# Patient Record
Sex: FEMALE | Race: WHITE | NOT HISPANIC OR LATINO | Employment: OTHER | URBAN - METROPOLITAN AREA
[De-identification: names, ages, dates, MRNs, and addresses within clinical notes are randomized per-mention and may not be internally consistent; named-entity substitution may affect disease eponyms.]

---

## 2019-08-01 ENCOUNTER — HOSPITAL ENCOUNTER (OUTPATIENT)
Dept: MRI IMAGING | Facility: HOSPITAL | Age: 49
Discharge: HOME | End: 2019-08-01

## 2021-08-27 ENCOUNTER — APPOINTMENT (OUTPATIENT)
Dept: GENERAL RADIOLOGY | Facility: HOSPITAL | Age: 51
End: 2021-08-27

## 2021-08-27 ENCOUNTER — HOSPITAL ENCOUNTER (EMERGENCY)
Facility: HOSPITAL | Age: 51
Discharge: HOME | End: 2021-08-27
Attending: EMERGENCY MEDICINE | Admitting: EMERGENCY MEDICINE

## 2021-08-27 VITALS
RESPIRATION RATE: 19 BRPM | SYSTOLIC BLOOD PRESSURE: 171 MMHG | DIASTOLIC BLOOD PRESSURE: 109 MMHG | BODY MASS INDEX: 28.12 KG/M2 | OXYGEN SATURATION: 98 % | WEIGHT: 175 LBS | TEMPERATURE: 99.3 F | HEART RATE: 102 BPM | HEIGHT: 66 IN

## 2021-08-27 DIAGNOSIS — I10 UNCONTROLLED HYPERTENSION: ICD-10-CM

## 2021-08-27 DIAGNOSIS — T40.601A OPIATE OVERDOSE, ACCIDENTAL OR UNINTENTIONAL, INITIAL ENCOUNTER (HCC): Primary | ICD-10-CM

## 2021-08-27 LAB
ALBUMIN SERPL-MCNC: 4.8 G/DL (ref 3.5–5.2)
ALBUMIN/GLOB SERPL: 1.4 G/DL
ALP SERPL-CCNC: 116 U/L (ref 39–117)
ALT SERPL W P-5'-P-CCNC: 29 U/L (ref 1–33)
ANION GAP SERPL CALCULATED.3IONS-SCNC: 13.5 MMOL/L (ref 5–15)
AST SERPL-CCNC: 31 U/L (ref 1–32)
BASOPHILS # BLD AUTO: 0.04 10*3/MM3 (ref 0–0.2)
BASOPHILS NFR BLD AUTO: 0.5 % (ref 0–1.5)
BILIRUB SERPL-MCNC: 0.4 MG/DL (ref 0–1.2)
BUN SERPL-MCNC: 11 MG/DL (ref 6–20)
BUN/CREAT SERPL: 8.8 (ref 7–25)
CALCIUM SPEC-SCNC: 9.7 MG/DL (ref 8.6–10.5)
CHLORIDE SERPL-SCNC: 101 MMOL/L (ref 98–107)
CO2 SERPL-SCNC: 25.5 MMOL/L (ref 22–29)
CREAT SERPL-MCNC: 1.25 MG/DL (ref 0.57–1)
DEPRECATED RDW RBC AUTO: 49.3 FL (ref 37–54)
EOSINOPHIL # BLD AUTO: 0.13 10*3/MM3 (ref 0–0.4)
EOSINOPHIL NFR BLD AUTO: 1.6 % (ref 0.3–6.2)
ERYTHROCYTE [DISTWIDTH] IN BLOOD BY AUTOMATED COUNT: 14.6 % (ref 12.3–15.4)
GFR SERPL CREATININE-BSD FRML MDRD: 45 ML/MIN/1.73
GLOBULIN UR ELPH-MCNC: 3.5 GM/DL
GLUCOSE SERPL-MCNC: 107 MG/DL (ref 65–99)
HCT VFR BLD AUTO: 46.6 % (ref 34–46.6)
HGB BLD-MCNC: 15.8 G/DL (ref 12–15.9)
HOLD SPECIMEN: NORMAL
HOLD SPECIMEN: NORMAL
IMM GRANULOCYTES # BLD AUTO: 0.02 10*3/MM3 (ref 0–0.05)
IMM GRANULOCYTES NFR BLD AUTO: 0.2 % (ref 0–0.5)
LYMPHOCYTES # BLD AUTO: 2.41 10*3/MM3 (ref 0.7–3.1)
LYMPHOCYTES NFR BLD AUTO: 28.8 % (ref 19.6–45.3)
MCH RBC QN AUTO: 31 PG (ref 26.6–33)
MCHC RBC AUTO-ENTMCNC: 33.9 G/DL (ref 31.5–35.7)
MCV RBC AUTO: 91.4 FL (ref 79–97)
MONOCYTES # BLD AUTO: 0.48 10*3/MM3 (ref 0.1–0.9)
MONOCYTES NFR BLD AUTO: 5.7 % (ref 5–12)
NEUTROPHILS NFR BLD AUTO: 5.28 10*3/MM3 (ref 1.7–7)
NEUTROPHILS NFR BLD AUTO: 63.2 % (ref 42.7–76)
NRBC BLD AUTO-RTO: 0 /100 WBC (ref 0–0.2)
PLATELET # BLD AUTO: 221 10*3/MM3 (ref 140–450)
PMV BLD AUTO: 10.4 FL (ref 6–12)
POTASSIUM SERPL-SCNC: 3.7 MMOL/L (ref 3.5–5.2)
PROT SERPL-MCNC: 8.3 G/DL (ref 6–8.5)
RBC # BLD AUTO: 5.1 10*6/MM3 (ref 3.77–5.28)
SODIUM SERPL-SCNC: 140 MMOL/L (ref 136–145)
WBC # BLD AUTO: 8.36 10*3/MM3 (ref 3.4–10.8)
WHOLE BLOOD HOLD SPECIMEN: NORMAL

## 2021-08-27 PROCEDURE — 71045 X-RAY EXAM CHEST 1 VIEW: CPT

## 2021-08-27 PROCEDURE — 80053 COMPREHEN METABOLIC PANEL: CPT | Performed by: EMERGENCY MEDICINE

## 2021-08-27 PROCEDURE — 71045 X-RAY EXAM CHEST 1 VIEW: CPT | Performed by: RADIOLOGY

## 2021-08-27 PROCEDURE — 85025 COMPLETE CBC W/AUTO DIFF WBC: CPT | Performed by: EMERGENCY MEDICINE

## 2021-08-27 PROCEDURE — 99284 EMERGENCY DEPT VISIT MOD MDM: CPT

## 2021-08-27 RX ORDER — LISINOPRIL 10 MG/1
10 TABLET ORAL DAILY
Qty: 30 TABLET | Refills: 0 | Status: SHIPPED | OUTPATIENT
Start: 2021-08-27 | End: 2023-01-29

## 2021-08-27 RX ADMIN — SODIUM CHLORIDE 1000 ML: 9 INJECTION, SOLUTION INTRAVENOUS at 16:46

## 2022-02-08 ENCOUNTER — TRANSCRIBE ORDERS (OUTPATIENT)
Dept: PHYSICAL THERAPY | Facility: CLINIC | Age: 52
End: 2022-02-08

## 2022-02-08 DIAGNOSIS — M54.2 CERVICALGIA: ICD-10-CM

## 2022-02-08 DIAGNOSIS — M54.6 PAIN IN THORACIC SPINE: ICD-10-CM

## 2022-02-08 DIAGNOSIS — M54.50 LOW BACK PAIN, UNSPECIFIED BACK PAIN LATERALITY, UNSPECIFIED CHRONICITY, UNSPECIFIED WHETHER SCIATICA PRESENT: Primary | ICD-10-CM

## 2022-03-02 ENCOUNTER — TREATMENT (OUTPATIENT)
Dept: PHYSICAL THERAPY | Facility: CLINIC | Age: 52
End: 2022-03-02

## 2022-03-02 DIAGNOSIS — M25.60 STIFFNESS IN JOINT: ICD-10-CM

## 2022-03-02 DIAGNOSIS — M54.50 LOW BACK PAIN, UNSPECIFIED BACK PAIN LATERALITY, UNSPECIFIED CHRONICITY, UNSPECIFIED WHETHER SCIATICA PRESENT: Primary | ICD-10-CM

## 2022-03-02 PROCEDURE — 97161 PT EVAL LOW COMPLEX 20 MIN: CPT | Performed by: PHYSICAL THERAPIST

## 2022-03-02 PROCEDURE — 97110 THERAPEUTIC EXERCISES: CPT | Performed by: PHYSICAL THERAPIST

## 2022-03-02 NOTE — PROGRESS NOTES
Physical Therapy Initial Evaluation and Plan of Care / Discharge    Patient: Davidson Lyle   : 1970  Diagnosis/ICD-10 Code:  Low back pain, unspecified back pain laterality, unspecified chronicity, unspecified whether sciatica present [M54.50]  Referring practitioner: Karly Mane,*  Date of Initial Visit: 3/2/2022  Today's Date: 3/2/2022  Patient seen for 1 sessions           Subjective Questionnaire: Oswestry: 3145 = 68.9% Disability      Subjective Evaluation    History of Present Illness  Mechanism of injury: The patient presents to physical therapy with complaints of low back pain which has been present since . The pain radiates down the left left to her toes. Her left toes are numb most of the day. Her back pain is worse with prolonged standing, sitting, or walking. She is currently taking hydrocodone 3x/day for pain management. Her pain is improved with laying down flat. She has tried injections in her back, but they did not give her any relief. She has been referred to PT to learn Carmen exercises that she can perform at home. She has had a cervical fusion from C4-5, but no low back surgeries.    Pain  Current pain ratin  At best pain ratin  At worst pain ratin    Patient Goals  Patient goal: The patient would like to learn exercises that she can perform at home for management.           Objective          Tenderness     Additional Tenderness Details  Tenderness bilaterally in lumbar paraspinals, piriformis muscle belly, and with PA glides from L2-L5    Neurological Testing     Additional Neurological Details  Hyposensation on left in L2-3 and numbness over first toe of left foot. Otherwise, sensation to light touch intact and equal bilaterally from L2-S1    Seated slump: (+) on left for increased sciatic neural tension    Supine passive SLR: (+) on left at 45 degrees for increased sciatic neural tension    Active Range of Motion     Lumbar   Flexion: 45 degrees  with pain  Extension: 5 degrees with pain  Left lateral flexion: Active left lumbar lateral flexion: mid thigh. with pain  Right lateral flexion: Active right lumbar lateral flexion: knee joint line. with pain  Left rotation: Active left lumbar rotation: 50% with pain  Right rotation: Active right lumbar rotation: 50% with pain    Strength/Myotome Testing     Left Hip   Planes of Motion   Flexion: 3+  Extension: 3+    Right Hip   Planes of Motion   Flexion: 4-  Extension: 3+      See Exercise, Manual, and Modality Logs for complete treatment.     Assessment & Plan     Assessment  Impairments: abnormal gait, abnormal muscle firing, abnormal muscle tone, abnormal or restricted ROM, activity intolerance, impaired physical strength, lacks appropriate home exercise program and pain with function  Functional Limitations: carrying objects, lifting, walking, uncomfortable because of pain, sitting, standing, stooping and unable to perform repetitive tasks  Assessment details: The patient presents to physical therapy with complaints of chronic low back pain with radicular pain into her left lower extremity. She presents with associated lumbar stiffness, hip weakness, tenderness to palpation, and functional mobility deficits (KITTY). She did not want to continue with traditional PT, but rather she wanted to continue with a home exercise program. She was provided with a detailed HEP and practiced each exercise before leaving the clinic today. She was educated on exercise modification if symptoms worsen. She is appropriate for discharge from PT at this time per patient request. She may benefit from additional PT for this condition in the future.    Goals  Plan Goals: N/a - Evaluation and discharge    Plan  Therapy options: will not be seen for skilled therapy services  Treatment plan discussed with: patient  Plan details: The patient wanted to be evaluated today and provided with a HEP instead of participating in traditional PT in  the clinic. She was provided with a detailed HEP and is appropriate for discharge at this time.        Visit Diagnoses:    ICD-10-CM ICD-9-CM   1. Low back pain, unspecified back pain laterality, unspecified chronicity, unspecified whether sciatica present  M54.50 724.2   2. Pain in thoracic spine  M54.6 724.1   3. Cervicalgia  M54.2 723.1       History # of Personal Factors and/or Comorbidities: LOW (0)  Examination of Body System(s): # of elements: LOW (1-2)  Clinical Presentation: STABLE   Clinical Decision Making: LOW       Timed:         Manual Therapy:    0     mins  06868;     Therapeutic Exercise:    10     mins  15605;     Neuromuscular Erinn:    0    mins  16123;    Therapeutic Activity:     0     mins  84104;     Gait Trainin     mins  09980;     Ultrasound:     0     mins  34753;    Ionto                               0    mins   61318  Self Care                       0     mins   21341  Canalith Repos    0     mins 18107      Un-Timed:  Electrical Stimulation:    0     mins  84425 (MC );  Dry Needling     0     mins self-pay  Traction     0     mins 84250  Low Eval     25     Mins  45483  Mod Eval     0     Mins  58452  High Eval                       0     Mins  23211  Re-Eval                           0    mins  90660    Timed Treatment:   10   mins   Total Treatment:     35   mins    PT SIGNATURE: Alex Powell PT    Electronically signed 3/2/2022    [place] License: PT - [id number]      Initial Certification  Certification Period: 3/2/2022 thru 2022  I certify that the therapy services are furnished while this patient is under my care.  The services outlined above are required by this patient, and will be reviewed every 90 days.     PHYSICIAN: Karly Mane APRN  NPI: [id number]      DATE:     Please sign and return via fax to [phone]. Thank you, [facility].

## 2022-10-25 ENCOUNTER — TRANSCRIBE ORDERS (OUTPATIENT)
Dept: LAB | Facility: HOSPITAL | Age: 52
End: 2022-10-25

## 2022-10-25 ENCOUNTER — LAB (OUTPATIENT)
Dept: LAB | Facility: HOSPITAL | Age: 52
End: 2022-10-25

## 2022-10-25 DIAGNOSIS — R73.9 HYPERGLYCEMIA: ICD-10-CM

## 2022-10-25 DIAGNOSIS — N95.1 MENOPAUSAL AND FEMALE CLIMACTERIC STATES: Primary | ICD-10-CM

## 2022-10-25 DIAGNOSIS — R53.83 OTHER FATIGUE: ICD-10-CM

## 2022-10-25 DIAGNOSIS — E55.9 VITAMIN D DEFICIENCY: ICD-10-CM

## 2022-10-25 DIAGNOSIS — N95.1 MENOPAUSAL AND FEMALE CLIMACTERIC STATES: ICD-10-CM

## 2022-10-25 DIAGNOSIS — I10 ESSENTIAL (PRIMARY) HYPERTENSION: ICD-10-CM

## 2022-10-25 LAB
25(OH)D3 SERPL-MCNC: 10.8 NG/ML (ref 30–100)
ALBUMIN SERPL-MCNC: 4.5 G/DL (ref 3.5–5.2)
ALBUMIN UR-MCNC: <1.2 MG/DL
ALBUMIN/GLOB SERPL: 1.5 G/DL
ALP SERPL-CCNC: 90 U/L (ref 39–117)
ALT SERPL W P-5'-P-CCNC: 50 U/L (ref 1–33)
ANION GAP SERPL CALCULATED.3IONS-SCNC: 11.5 MMOL/L (ref 5–15)
AST SERPL-CCNC: 43 U/L (ref 1–32)
BACTERIA UR QL AUTO: ABNORMAL /HPF
BASOPHILS # BLD AUTO: 0.05 10*3/MM3 (ref 0–0.2)
BASOPHILS NFR BLD AUTO: 0.7 % (ref 0–1.5)
BILIRUB SERPL-MCNC: 0.2 MG/DL (ref 0–1.2)
BILIRUB UR QL STRIP: NEGATIVE
BUN SERPL-MCNC: 20 MG/DL (ref 6–20)
BUN/CREAT SERPL: 15.5 (ref 7–25)
CALCIUM SPEC-SCNC: 9.2 MG/DL (ref 8.6–10.5)
CHLORIDE SERPL-SCNC: 101 MMOL/L (ref 98–107)
CHOLEST SERPL-MCNC: 217 MG/DL (ref 0–200)
CLARITY UR: ABNORMAL
CO2 SERPL-SCNC: 26.5 MMOL/L (ref 22–29)
COLOR UR: YELLOW
CREAT SERPL-MCNC: 1.29 MG/DL (ref 0.57–1)
DEPRECATED RDW RBC AUTO: 45.3 FL (ref 37–54)
EGFRCR SERPLBLD CKD-EPI 2021: 49.4 ML/MIN/1.73
EOSINOPHIL # BLD AUTO: 0.11 10*3/MM3 (ref 0–0.4)
EOSINOPHIL NFR BLD AUTO: 1.5 % (ref 0.3–6.2)
ERYTHROCYTE [DISTWIDTH] IN BLOOD BY AUTOMATED COUNT: 12.8 % (ref 12.3–15.4)
ESTRADIOL SERPL HS-MCNC: 18.5 PG/ML
FOLATE SERPL-MCNC: 9.55 NG/ML (ref 4.78–24.2)
FSH SERPL-ACNC: 69.8 MIU/ML
GLOBULIN UR ELPH-MCNC: 3 GM/DL
GLUCOSE SERPL-MCNC: 120 MG/DL (ref 65–99)
GLUCOSE UR STRIP-MCNC: NEGATIVE MG/DL
HBA1C MFR BLD: 5.9 % (ref 4.8–5.6)
HCT VFR BLD AUTO: 43.6 % (ref 34–46.6)
HDLC SERPL-MCNC: 55 MG/DL (ref 40–60)
HGB BLD-MCNC: 14.6 G/DL (ref 12–15.9)
HGB UR QL STRIP.AUTO: NEGATIVE
HYALINE CASTS UR QL AUTO: ABNORMAL /LPF
IMM GRANULOCYTES # BLD AUTO: 0.02 10*3/MM3 (ref 0–0.05)
IMM GRANULOCYTES NFR BLD AUTO: 0.3 % (ref 0–0.5)
KETONES UR QL STRIP: NEGATIVE
LDLC SERPL CALC-MCNC: 134 MG/DL (ref 0–100)
LDLC/HDLC SERPL: 2.36 {RATIO}
LEUKOCYTE ESTERASE UR QL STRIP.AUTO: ABNORMAL
LYMPHOCYTES # BLD AUTO: 2.4 10*3/MM3 (ref 0.7–3.1)
LYMPHOCYTES NFR BLD AUTO: 33.7 % (ref 19.6–45.3)
MCH RBC QN AUTO: 32.5 PG (ref 26.6–33)
MCHC RBC AUTO-ENTMCNC: 33.5 G/DL (ref 31.5–35.7)
MCV RBC AUTO: 97.1 FL (ref 79–97)
MONOCYTES # BLD AUTO: 0.4 10*3/MM3 (ref 0.1–0.9)
MONOCYTES NFR BLD AUTO: 5.6 % (ref 5–12)
NEUTROPHILS NFR BLD AUTO: 4.14 10*3/MM3 (ref 1.7–7)
NEUTROPHILS NFR BLD AUTO: 58.2 % (ref 42.7–76)
NITRITE UR QL STRIP: NEGATIVE
NRBC BLD AUTO-RTO: 0 /100 WBC (ref 0–0.2)
PH UR STRIP.AUTO: 6 [PH] (ref 5–8)
PLATELET # BLD AUTO: 235 10*3/MM3 (ref 140–450)
PMV BLD AUTO: 11.5 FL (ref 6–12)
POTASSIUM SERPL-SCNC: 4.5 MMOL/L (ref 3.5–5.2)
PROGEST SERPL-MCNC: 0.17 NG/ML
PROT SERPL-MCNC: 7.5 G/DL (ref 6–8.5)
PROT UR QL STRIP: NEGATIVE
RBC # BLD AUTO: 4.49 10*6/MM3 (ref 3.77–5.28)
RBC # UR STRIP: ABNORMAL /HPF
REF LAB TEST METHOD: ABNORMAL
SODIUM SERPL-SCNC: 139 MMOL/L (ref 136–145)
SP GR UR STRIP: 1.01 (ref 1–1.03)
SQUAMOUS #/AREA URNS HPF: ABNORMAL /HPF
TRIGL SERPL-MCNC: 160 MG/DL (ref 0–150)
TSH SERPL DL<=0.05 MIU/L-ACNC: 5.5 UIU/ML (ref 0.27–4.2)
UROBILINOGEN UR QL STRIP: ABNORMAL
VIT B12 BLD-MCNC: 375 PG/ML (ref 211–946)
VLDLC SERPL-MCNC: 28 MG/DL (ref 5–40)
WBC # UR STRIP: ABNORMAL /HPF
WBC NRBC COR # BLD: 7.12 10*3/MM3 (ref 3.4–10.8)

## 2022-10-25 PROCEDURE — 82670 ASSAY OF TOTAL ESTRADIOL: CPT

## 2022-10-25 PROCEDURE — 82306 VITAMIN D 25 HYDROXY: CPT

## 2022-10-25 PROCEDURE — 83036 HEMOGLOBIN GLYCOSYLATED A1C: CPT

## 2022-10-25 PROCEDURE — 36415 COLL VENOUS BLD VENIPUNCTURE: CPT

## 2022-10-25 PROCEDURE — 80053 COMPREHEN METABOLIC PANEL: CPT

## 2022-10-25 PROCEDURE — 84144 ASSAY OF PROGESTERONE: CPT

## 2022-10-25 PROCEDURE — 80061 LIPID PANEL: CPT

## 2022-10-25 PROCEDURE — 82043 UR ALBUMIN QUANTITATIVE: CPT

## 2022-10-25 PROCEDURE — 84443 ASSAY THYROID STIM HORMONE: CPT

## 2022-10-25 PROCEDURE — 81001 URINALYSIS AUTO W/SCOPE: CPT

## 2022-10-25 PROCEDURE — 83001 ASSAY OF GONADOTROPIN (FSH): CPT

## 2022-10-25 PROCEDURE — 82607 VITAMIN B-12: CPT

## 2022-10-25 PROCEDURE — 82746 ASSAY OF FOLIC ACID SERUM: CPT

## 2022-10-25 PROCEDURE — 85025 COMPLETE CBC W/AUTO DIFF WBC: CPT

## 2022-11-02 ENCOUNTER — TRANSCRIBE ORDERS (OUTPATIENT)
Dept: ADMINISTRATIVE | Facility: HOSPITAL | Age: 52
End: 2022-11-02

## 2022-11-02 DIAGNOSIS — R74.01 ELEVATED TRANSAMINASE LEVEL: Primary | ICD-10-CM

## 2022-11-02 DIAGNOSIS — Z12.31 SCREENING MAMMOGRAM, ENCOUNTER FOR: Primary | ICD-10-CM

## 2022-12-01 ENCOUNTER — TRANSCRIBE ORDERS (OUTPATIENT)
Dept: LAB | Facility: HOSPITAL | Age: 52
End: 2022-12-01

## 2022-12-01 DIAGNOSIS — R53.83 OTHER FATIGUE: ICD-10-CM

## 2022-12-01 DIAGNOSIS — E55.9 VITAMIN D DEFICIENCY: ICD-10-CM

## 2022-12-01 DIAGNOSIS — E03.9 HYPOTHYROIDISM, UNSPECIFIED TYPE: Primary | ICD-10-CM

## 2022-12-08 ENCOUNTER — APPOINTMENT (OUTPATIENT)
Dept: ULTRASOUND IMAGING | Facility: HOSPITAL | Age: 52
End: 2022-12-08

## 2023-01-29 ENCOUNTER — APPOINTMENT (OUTPATIENT)
Dept: GENERAL RADIOLOGY | Facility: HOSPITAL | Age: 53
DRG: 682 | End: 2023-01-29
Payer: MEDICARE

## 2023-01-29 ENCOUNTER — APPOINTMENT (OUTPATIENT)
Dept: CT IMAGING | Facility: HOSPITAL | Age: 53
DRG: 682 | End: 2023-01-29
Payer: MEDICARE

## 2023-01-29 ENCOUNTER — HOSPITAL ENCOUNTER (INPATIENT)
Facility: HOSPITAL | Age: 53
LOS: 1 days | Discharge: HOME | DRG: 682 | End: 2023-02-01
Attending: EMERGENCY MEDICINE | Admitting: INTERNAL MEDICINE
Payer: MEDICARE

## 2023-01-29 DIAGNOSIS — N17.0 ACUTE KIDNEY INJURY (AKI) WITH ACUTE TUBULAR NECROSIS (ATN): Primary | ICD-10-CM

## 2023-01-29 DIAGNOSIS — R26.2 DIFFICULTY IN WALKING: ICD-10-CM

## 2023-01-29 DIAGNOSIS — Z78.9 DECREASED ACTIVITIES OF DAILY LIVING (ADL): ICD-10-CM

## 2023-01-29 DIAGNOSIS — R13.12 DYSPHAGIA, OROPHARYNGEAL: ICD-10-CM

## 2023-01-29 DIAGNOSIS — D68.00 VON WILLEBRAND'S DISEASE: ICD-10-CM

## 2023-01-29 PROBLEM — N17.9 AKI (ACUTE KIDNEY INJURY): Status: ACTIVE | Noted: 2023-01-29

## 2023-01-29 LAB
ALBUMIN SERPL-MCNC: 4 G/DL (ref 3.5–5.2)
ALBUMIN/GLOB SERPL: 1.3 G/DL
ALP SERPL-CCNC: 100 U/L (ref 39–117)
ALT SERPL W P-5'-P-CCNC: 69 U/L (ref 1–33)
AMPHET+METHAMPHET UR QL: NEGATIVE
AMPHET+METHAMPHET UR QL: NEGATIVE
ANION GAP SERPL CALCULATED.3IONS-SCNC: 12.5 MMOL/L (ref 5–15)
AST SERPL-CCNC: 172 U/L (ref 1–32)
BACTERIA UR QL AUTO: ABNORMAL /HPF
BARBITURATES UR QL SCN: NEGATIVE
BARBITURATES UR QL SCN: NEGATIVE
BASOPHILS # BLD AUTO: 0.04 10*3/MM3 (ref 0–0.2)
BASOPHILS NFR BLD AUTO: 0.4 % (ref 0–1.5)
BENZODIAZ UR QL SCN: NEGATIVE
BENZODIAZ UR QL SCN: NEGATIVE
BILIRUB SERPL-MCNC: 0.3 MG/DL (ref 0–1.2)
BILIRUB UR QL STRIP: NEGATIVE
BUN SERPL-MCNC: 33 MG/DL (ref 6–20)
BUN/CREAT SERPL: 9.6 (ref 7–25)
CALCIUM SPEC-SCNC: 8.5 MG/DL (ref 8.6–10.5)
CANNABINOIDS SERPL QL: NEGATIVE
CANNABINOIDS SERPL QL: NEGATIVE
CHLORIDE SERPL-SCNC: 93 MMOL/L (ref 98–107)
CK SERPL-CCNC: ABNORMAL U/L (ref 20–180)
CLARITY UR: ABNORMAL
CO2 SERPL-SCNC: 24.5 MMOL/L (ref 22–29)
COCAINE UR QL: NEGATIVE
COCAINE UR QL: NEGATIVE
COLOR UR: YELLOW
CREAT SERPL-MCNC: 3.43 MG/DL (ref 0.57–1)
D-LACTATE SERPL-SCNC: 1.2 MMOL/L (ref 0.5–2)
DEPRECATED RDW RBC AUTO: 45.5 FL (ref 37–54)
EGFRCR SERPLBLD CKD-EPI 2021: 15.3 ML/MIN/1.73
EOSINOPHIL # BLD AUTO: 0.15 10*3/MM3 (ref 0–0.4)
EOSINOPHIL NFR BLD AUTO: 1.5 % (ref 0.3–6.2)
ERYTHROCYTE [DISTWIDTH] IN BLOOD BY AUTOMATED COUNT: 13.4 % (ref 12.3–15.4)
GLOBULIN UR ELPH-MCNC: 3.1 GM/DL
GLUCOSE BLDC GLUCOMTR-MCNC: 98 MG/DL (ref 70–99)
GLUCOSE SERPL-MCNC: 113 MG/DL (ref 65–99)
GLUCOSE UR STRIP-MCNC: NEGATIVE MG/DL
HCT VFR BLD AUTO: 38.7 % (ref 34–46.6)
HGB BLD-MCNC: 12.9 G/DL (ref 12–15.9)
HGB UR QL STRIP.AUTO: ABNORMAL
HOLD SPECIMEN: NORMAL
HOLD SPECIMEN: NORMAL
HYALINE CASTS UR QL AUTO: ABNORMAL /LPF
IMM GRANULOCYTES # BLD AUTO: 0.02 10*3/MM3 (ref 0–0.05)
IMM GRANULOCYTES NFR BLD AUTO: 0.2 % (ref 0–0.5)
KETONES UR QL STRIP: NEGATIVE
LEUKOCYTE ESTERASE UR QL STRIP.AUTO: NEGATIVE
LYMPHOCYTES # BLD AUTO: 3.1 10*3/MM3 (ref 0.7–3.1)
LYMPHOCYTES NFR BLD AUTO: 31.6 % (ref 19.6–45.3)
MAGNESIUM SERPL-MCNC: 2.5 MG/DL (ref 1.6–2.6)
MCH RBC QN AUTO: 31.2 PG (ref 26.6–33)
MCHC RBC AUTO-ENTMCNC: 33.3 G/DL (ref 31.5–35.7)
MCV RBC AUTO: 93.7 FL (ref 79–97)
METHADONE UR QL SCN: POSITIVE
METHADONE UR QL SCN: POSITIVE
MONOCYTES # BLD AUTO: 0.81 10*3/MM3 (ref 0.1–0.9)
MONOCYTES NFR BLD AUTO: 8.2 % (ref 5–12)
NEUTROPHILS NFR BLD AUTO: 5.7 10*3/MM3 (ref 1.7–7)
NEUTROPHILS NFR BLD AUTO: 58.1 % (ref 42.7–76)
NITRITE UR QL STRIP: NEGATIVE
NRBC BLD AUTO-RTO: 0 /100 WBC (ref 0–0.2)
OPIATES UR QL: POSITIVE
OPIATES UR QL: POSITIVE
OXYCODONE UR QL SCN: NEGATIVE
OXYCODONE UR QL SCN: NEGATIVE
PH UR STRIP.AUTO: <=5 [PH] (ref 5–8)
PHOSPHATE SERPL-MCNC: 5.9 MG/DL (ref 2.5–4.5)
PLATELET # BLD AUTO: 187 10*3/MM3 (ref 140–450)
PMV BLD AUTO: 11.9 FL (ref 6–12)
POTASSIUM SERPL-SCNC: 4.2 MMOL/L (ref 3.5–5.2)
PROT SERPL-MCNC: 7.1 G/DL (ref 6–8.5)
PROT UR QL STRIP: ABNORMAL
QT INTERVAL: 498 MS
RBC # BLD AUTO: 4.13 10*6/MM3 (ref 3.77–5.28)
RBC # UR STRIP: ABNORMAL /HPF
REF LAB TEST METHOD: ABNORMAL
SODIUM SERPL-SCNC: 130 MMOL/L (ref 136–145)
SP GR UR STRIP: 1.01 (ref 1–1.03)
SQUAMOUS #/AREA URNS HPF: ABNORMAL /HPF
TROPONIN T SERPL-MCNC: <0.01 NG/ML (ref 0–0.03)
UROBILINOGEN UR QL STRIP: ABNORMAL
WBC # UR STRIP: ABNORMAL /HPF
WBC NRBC COR # BLD: 9.82 10*3/MM3 (ref 3.4–10.8)
WHOLE BLOOD HOLD COAG: NORMAL
WHOLE BLOOD HOLD SPECIMEN: NORMAL

## 2023-01-29 PROCEDURE — 80307 DRUG TEST PRSMV CHEM ANLYZR: CPT | Performed by: INTERNAL MEDICINE

## 2023-01-29 PROCEDURE — 82550 ASSAY OF CK (CPK): CPT | Performed by: EMERGENCY MEDICINE

## 2023-01-29 PROCEDURE — 80307 DRUG TEST PRSMV CHEM ANLYZR: CPT | Performed by: EMERGENCY MEDICINE

## 2023-01-29 PROCEDURE — 82077 ASSAY SPEC XCP UR&BREATH IA: CPT | Performed by: EMERGENCY MEDICINE

## 2023-01-29 PROCEDURE — 80179 DRUG ASSAY SALICYLATE: CPT | Performed by: EMERGENCY MEDICINE

## 2023-01-29 PROCEDURE — 71045 X-RAY EXAM CHEST 1 VIEW: CPT

## 2023-01-29 PROCEDURE — 36415 COLL VENOUS BLD VENIPUNCTURE: CPT | Performed by: EMERGENCY MEDICINE

## 2023-01-29 PROCEDURE — G0378 HOSPITAL OBSERVATION PER HR: HCPCS

## 2023-01-29 PROCEDURE — 84443 ASSAY THYROID STIM HORMONE: CPT | Performed by: EMERGENCY MEDICINE

## 2023-01-29 PROCEDURE — 25010000002 CEFTRIAXONE PER 250 MG: Performed by: EMERGENCY MEDICINE

## 2023-01-29 PROCEDURE — 80143 DRUG ASSAY ACETAMINOPHEN: CPT | Performed by: EMERGENCY MEDICINE

## 2023-01-29 PROCEDURE — 87040 BLOOD CULTURE FOR BACTERIA: CPT | Performed by: EMERGENCY MEDICINE

## 2023-01-29 PROCEDURE — 84100 ASSAY OF PHOSPHORUS: CPT | Performed by: INTERNAL MEDICINE

## 2023-01-29 PROCEDURE — 70450 CT HEAD/BRAIN W/O DYE: CPT

## 2023-01-29 PROCEDURE — 84484 ASSAY OF TROPONIN QUANT: CPT | Performed by: EMERGENCY MEDICINE

## 2023-01-29 PROCEDURE — 80178 ASSAY OF LITHIUM: CPT | Performed by: EMERGENCY MEDICINE

## 2023-01-29 PROCEDURE — 83735 ASSAY OF MAGNESIUM: CPT | Performed by: INTERNAL MEDICINE

## 2023-01-29 PROCEDURE — 84439 ASSAY OF FREE THYROXINE: CPT | Performed by: EMERGENCY MEDICINE

## 2023-01-29 PROCEDURE — 99223 1ST HOSP IP/OBS HIGH 75: CPT | Performed by: INTERNAL MEDICINE

## 2023-01-29 PROCEDURE — 81001 URINALYSIS AUTO W/SCOPE: CPT | Performed by: EMERGENCY MEDICINE

## 2023-01-29 PROCEDURE — 93005 ELECTROCARDIOGRAM TRACING: CPT | Performed by: EMERGENCY MEDICINE

## 2023-01-29 PROCEDURE — 83735 ASSAY OF MAGNESIUM: CPT | Performed by: EMERGENCY MEDICINE

## 2023-01-29 PROCEDURE — 82962 GLUCOSE BLOOD TEST: CPT

## 2023-01-29 PROCEDURE — 82550 ASSAY OF CK (CPK): CPT | Performed by: INTERNAL MEDICINE

## 2023-01-29 PROCEDURE — 83605 ASSAY OF LACTIC ACID: CPT | Performed by: EMERGENCY MEDICINE

## 2023-01-29 PROCEDURE — 93010 ELECTROCARDIOGRAM REPORT: CPT | Performed by: INTERNAL MEDICINE

## 2023-01-29 PROCEDURE — 99285 EMERGENCY DEPT VISIT HI MDM: CPT

## 2023-01-29 PROCEDURE — 80053 COMPREHEN METABOLIC PANEL: CPT | Performed by: EMERGENCY MEDICINE

## 2023-01-29 PROCEDURE — 85025 COMPLETE CBC W/AUTO DIFF WBC: CPT | Performed by: EMERGENCY MEDICINE

## 2023-01-29 PROCEDURE — 25010000002 HEPARIN (PORCINE) PER 1000 UNITS: Performed by: INTERNAL MEDICINE

## 2023-01-29 RX ORDER — HEPARIN SODIUM 5000 [USP'U]/ML
5000 INJECTION, SOLUTION INTRAVENOUS; SUBCUTANEOUS EVERY 8 HOURS SCHEDULED
Status: DISCONTINUED | OUTPATIENT
Start: 2023-01-29 | End: 2023-02-01 | Stop reason: HOSPADM

## 2023-01-29 RX ORDER — METOPROLOL SUCCINATE 25 MG/1
25 TABLET, EXTENDED RELEASE ORAL DAILY
COMMUNITY
End: 2023-02-01 | Stop reason: HOSPADM

## 2023-01-29 RX ORDER — CEFTRIAXONE SODIUM 1 G/50ML
1 INJECTION, SOLUTION INTRAVENOUS ONCE
Status: COMPLETED | OUTPATIENT
Start: 2023-01-29 | End: 2023-01-29

## 2023-01-29 RX ORDER — ERGOCALCIFEROL 1.25 MG/1
50000 CAPSULE ORAL WEEKLY
COMMUNITY

## 2023-01-29 RX ORDER — ATORVASTATIN CALCIUM 10 MG/1
10 TABLET, FILM COATED ORAL NIGHTLY
COMMUNITY
End: 2023-02-01 | Stop reason: HOSPADM

## 2023-01-29 RX ORDER — SODIUM CHLORIDE 0.9 % (FLUSH) 0.9 %
10 SYRINGE (ML) INJECTION AS NEEDED
Status: DISCONTINUED | OUTPATIENT
Start: 2023-01-29 | End: 2023-02-01 | Stop reason: HOSPADM

## 2023-01-29 RX ORDER — CYANOCOBALAMIN 1000 UG/ML
1 INJECTION, SOLUTION INTRAMUSCULAR; SUBCUTANEOUS
COMMUNITY
Start: 2023-01-08

## 2023-01-29 RX ORDER — SODIUM CHLORIDE 0.9 % (FLUSH) 0.9 %
10 SYRINGE (ML) INJECTION EVERY 12 HOURS SCHEDULED
Status: DISCONTINUED | OUTPATIENT
Start: 2023-01-29 | End: 2023-02-01 | Stop reason: HOSPADM

## 2023-01-29 RX ORDER — SODIUM CHLORIDE 9 MG/ML
INJECTION, SOLUTION INTRAVENOUS
Status: COMPLETED
Start: 2023-01-29 | End: 2023-01-29

## 2023-01-29 RX ORDER — SODIUM CHLORIDE 9 MG/ML
125 INJECTION, SOLUTION INTRAVENOUS CONTINUOUS
Status: DISCONTINUED | OUTPATIENT
Start: 2023-01-29 | End: 2023-01-30

## 2023-01-29 RX ORDER — TIZANIDINE 2 MG/1
4 TABLET ORAL EVERY 12 HOURS SCHEDULED
COMMUNITY

## 2023-01-29 RX ORDER — LIDOCAINE 50 MG/G
1 PATCH TOPICAL DAILY
COMMUNITY
Start: 2023-01-28

## 2023-01-29 RX ORDER — HYDROCODONE BITARTRATE AND ACETAMINOPHEN 10; 325 MG/1; MG/1
1 TABLET ORAL EVERY 8 HOURS PRN
COMMUNITY

## 2023-01-29 RX ORDER — PANTOPRAZOLE SODIUM 40 MG/1
TABLET, DELAYED RELEASE ORAL
COMMUNITY
End: 2023-01-29

## 2023-01-29 RX ORDER — NITROGLYCERIN 0.4 MG/1
0.4 TABLET SUBLINGUAL
Status: DISCONTINUED | OUTPATIENT
Start: 2023-01-29 | End: 2023-02-01 | Stop reason: HOSPADM

## 2023-01-29 RX ORDER — LOSARTAN POTASSIUM 25 MG/1
25 TABLET ORAL 2 TIMES DAILY
COMMUNITY
End: 2023-02-01 | Stop reason: HOSPADM

## 2023-01-29 RX ORDER — FAMOTIDINE 20 MG/1
20 TABLET, FILM COATED ORAL NIGHTLY PRN
COMMUNITY

## 2023-01-29 RX ORDER — GABAPENTIN 800 MG/1
800 TABLET ORAL 4 TIMES DAILY
COMMUNITY

## 2023-01-29 RX ORDER — AMITRIPTYLINE HYDROCHLORIDE 10 MG/1
10 TABLET, FILM COATED ORAL NIGHTLY
COMMUNITY

## 2023-01-29 RX ORDER — LEVOTHYROXINE SODIUM 0.03 MG/1
1 TABLET ORAL DAILY
COMMUNITY
Start: 2023-01-25

## 2023-01-29 RX ORDER — ACETAMINOPHEN 325 MG/1
650 TABLET ORAL EVERY 4 HOURS PRN
Status: DISCONTINUED | OUTPATIENT
Start: 2023-01-29 | End: 2023-02-01 | Stop reason: HOSPADM

## 2023-01-29 RX ORDER — SODIUM CHLORIDE 9 MG/ML
40 INJECTION, SOLUTION INTRAVENOUS AS NEEDED
Status: DISCONTINUED | OUTPATIENT
Start: 2023-01-29 | End: 2023-02-01 | Stop reason: HOSPADM

## 2023-01-29 RX ADMIN — SODIUM CHLORIDE 125 ML/HR: 9 INJECTION, SOLUTION INTRAVENOUS at 23:16

## 2023-01-29 RX ADMIN — SODIUM CHLORIDE 500 ML: 9 INJECTION, SOLUTION INTRAVENOUS at 23:16

## 2023-01-29 RX ADMIN — CEFTRIAXONE SODIUM 1 G: 1 INJECTION, SOLUTION INTRAVENOUS at 18:56

## 2023-01-29 RX ADMIN — SODIUM CHLORIDE: 9 INJECTION, SOLUTION INTRAVENOUS at 17:27

## 2023-01-29 RX ADMIN — HEPARIN SODIUM 5000 UNITS: 5000 INJECTION INTRAVENOUS; SUBCUTANEOUS at 21:44

## 2023-01-29 RX ADMIN — Medication 10 ML: at 20:31

## 2023-01-29 RX ADMIN — SODIUM CHLORIDE 1000 ML: 9 INJECTION, SOLUTION INTRAVENOUS at 16:04

## 2023-01-30 ENCOUNTER — APPOINTMENT (OUTPATIENT)
Dept: MRI IMAGING | Facility: HOSPITAL | Age: 53
DRG: 682 | End: 2023-01-30
Payer: MEDICARE

## 2023-01-30 ENCOUNTER — APPOINTMENT (OUTPATIENT)
Dept: CARDIOLOGY | Facility: HOSPITAL | Age: 53
DRG: 682 | End: 2023-01-30
Payer: MEDICARE

## 2023-01-30 ENCOUNTER — APPOINTMENT (OUTPATIENT)
Dept: NEUROLOGY | Facility: HOSPITAL | Age: 53
DRG: 682 | End: 2023-01-30
Payer: MEDICARE

## 2023-01-30 PROBLEM — E66.9 OBESITY: Status: ACTIVE | Noted: 2023-01-30

## 2023-01-30 PROBLEM — G89.4 CHRONIC PAIN SYNDROME: Status: ACTIVE | Noted: 2023-01-30

## 2023-01-30 PROBLEM — I10 ESSENTIAL HYPERTENSION: Status: ACTIVE | Noted: 2023-01-30

## 2023-01-30 PROBLEM — E87.1 HYPONATREMIA: Status: ACTIVE | Noted: 2023-01-30

## 2023-01-30 LAB
ALBUMIN SERPL-MCNC: 3.8 G/DL (ref 3.5–5.2)
ALBUMIN/GLOB SERPL: 1.3 G/DL
ALP SERPL-CCNC: 92 U/L (ref 39–117)
ALT SERPL W P-5'-P-CCNC: 58 U/L (ref 1–33)
ANION GAP SERPL CALCULATED.3IONS-SCNC: 7.7 MMOL/L (ref 5–15)
ANION GAP SERPL CALCULATED.3IONS-SCNC: 9.4 MMOL/L (ref 5–15)
APAP SERPL-MCNC: <5 MCG/ML (ref 0–30)
ASCENDING AORTA: 3 CM
AST SERPL-CCNC: 131 U/L (ref 1–32)
BH CV ECHO MEAS - AO MAX PG: 17 MMHG
BH CV ECHO MEAS - AO MEAN PG: 8 MMHG
BH CV ECHO MEAS - AO ROOT DIAM: 3.1 CM
BH CV ECHO MEAS - AO V2 MAX: 207 CM/SEC
BH CV ECHO MEAS - AO V2 VTI: 37 CM
BH CV ECHO MEAS - AVA(I,D): 2.7 CM2
BH CV ECHO MEAS - EDV(MOD-SP2): 78 ML
BH CV ECHO MEAS - EDV(MOD-SP4): 54 ML
BH CV ECHO MEAS - EF(MOD-BP): 74 %
BH CV ECHO MEAS - ESV(MOD-SP2): 22 ML
BH CV ECHO MEAS - ESV(MOD-SP4): 12 ML
BH CV ECHO MEAS - IVSD: 1.1 CM
BH CV ECHO MEAS - LA DIMENSION: 4 CM
BH CV ECHO MEAS - LAT PEAK E' VEL: 17.3 CM/SEC
BH CV ECHO MEAS - LV MAX PG: 11 MMHG
BH CV ECHO MEAS - LV MEAN PG: 6 MMHG
BH CV ECHO MEAS - LV V1 MAX: 165 CM/SEC
BH CV ECHO MEAS - LV V1 VTI: 32 CM
BH CV ECHO MEAS - LVIDD: 4.3 CM
BH CV ECHO MEAS - LVIDS: 2.5 CM
BH CV ECHO MEAS - LVOT DIAM: 2 CM
BH CV ECHO MEAS - LVPWD: 1.2 CM
BH CV ECHO MEAS - MED PEAK E' VEL: 10.1 CM/SEC
BH CV ECHO MEAS - MV A MAX VEL: 106 CM/SEC
BH CV ECHO MEAS - MV DEC TIME: 173 MSEC
BH CV ECHO MEAS - MV E MAX VEL: 113 CM/SEC
BH CV ECHO MEAS - MV E/A: 1.1
BH CV ECHO MEAS - RVDD: 2.3 CM
BH CV ECHO MEASUREMENTS AVERAGE E/E' RATIO: 8.25
BILIRUB SERPL-MCNC: 0.2 MG/DL (ref 0–1.2)
BUN SERPL-MCNC: 19 MG/DL (ref 6–20)
BUN SERPL-MCNC: 22 MG/DL (ref 6–20)
BUN/CREAT SERPL: 11.9 (ref 7–25)
BUN/CREAT SERPL: 12.7 (ref 7–25)
CALCIUM SPEC-SCNC: 8.3 MG/DL (ref 8.6–10.5)
CALCIUM SPEC-SCNC: 8.4 MG/DL (ref 8.6–10.5)
CHLORIDE SERPL-SCNC: 108 MMOL/L (ref 98–107)
CHLORIDE SERPL-SCNC: 112 MMOL/L (ref 98–107)
CHOLEST SERPL-MCNC: 115 MG/DL (ref 0–200)
CK SERPL-CCNC: 9211 U/L (ref 20–180)
CK SERPL-CCNC: ABNORMAL U/L (ref 20–180)
CO2 SERPL-SCNC: 21.6 MMOL/L (ref 22–29)
CO2 SERPL-SCNC: 24.3 MMOL/L (ref 22–29)
CREAT SERPL-MCNC: 1.5 MG/DL (ref 0.57–1)
CREAT SERPL-MCNC: 1.85 MG/DL (ref 0.57–1)
EGFRCR SERPLBLD CKD-EPI 2021: 32.1 ML/MIN/1.73
EGFRCR SERPLBLD CKD-EPI 2021: 41.2 ML/MIN/1.73
ETHANOL BLD-MCNC: <10 MG/DL (ref 0–10)
ETHANOL UR QL: <0.01 %
GLOBULIN UR ELPH-MCNC: 3 GM/DL
GLUCOSE BLDC GLUCOMTR-MCNC: 115 MG/DL (ref 70–99)
GLUCOSE SERPL-MCNC: 106 MG/DL (ref 65–99)
GLUCOSE SERPL-MCNC: 118 MG/DL (ref 65–99)
HDLC SERPL-MCNC: 41 MG/DL (ref 40–60)
LDLC SERPL CALC-MCNC: 48 MG/DL (ref 0–100)
LDLC/HDLC SERPL: 1.07 {RATIO}
LEFT ATRIUM VOLUME INDEX: 28.7 ML/M2
LITHIUM SERPL-SCNC: <0.1 MMOL/L (ref 0.6–1.2)
MAGNESIUM SERPL-MCNC: 2.4 MG/DL (ref 1.6–2.6)
MAXIMAL PREDICTED HEART RATE: 166 BPM
POTASSIUM SERPL-SCNC: 4.4 MMOL/L (ref 3.5–5.2)
POTASSIUM SERPL-SCNC: 4.5 MMOL/L (ref 3.5–5.2)
PROT SERPL-MCNC: 6.8 G/DL (ref 6–8.5)
SALICYLATES SERPL-MCNC: <0.3 MG/DL
SODIUM SERPL-SCNC: 140 MMOL/L (ref 136–145)
SODIUM SERPL-SCNC: 143 MMOL/L (ref 136–145)
STRESS TARGET HR: 141 BPM
T4 FREE SERPL-MCNC: 1.04 NG/DL (ref 0.93–1.7)
TRIGL SERPL-MCNC: 151 MG/DL (ref 0–150)
TSH SERPL DL<=0.05 MIU/L-ACNC: 2.29 UIU/ML (ref 0.27–4.2)
VLDLC SERPL-MCNC: 26 MG/DL (ref 5–40)

## 2023-01-30 PROCEDURE — 93306 TTE W/DOPPLER COMPLETE: CPT

## 2023-01-30 PROCEDURE — 80061 LIPID PANEL: CPT | Performed by: INTERNAL MEDICINE

## 2023-01-30 PROCEDURE — 93005 ELECTROCARDIOGRAM TRACING: CPT | Performed by: SPECIALIST

## 2023-01-30 PROCEDURE — G0378 HOSPITAL OBSERVATION PER HR: HCPCS

## 2023-01-30 PROCEDURE — 97161 PT EVAL LOW COMPLEX 20 MIN: CPT

## 2023-01-30 PROCEDURE — 93010 ELECTROCARDIOGRAM REPORT: CPT | Performed by: INTERNAL MEDICINE

## 2023-01-30 PROCEDURE — 70547 MR ANGIOGRAPHY NECK W/O DYE: CPT

## 2023-01-30 PROCEDURE — 99204 OFFICE O/P NEW MOD 45 MIN: CPT | Performed by: PSYCHIATRY & NEUROLOGY

## 2023-01-30 PROCEDURE — 95816 EEG AWAKE AND DROWSY: CPT

## 2023-01-30 PROCEDURE — 95816 EEG AWAKE AND DROWSY: CPT | Performed by: PSYCHIATRY & NEUROLOGY

## 2023-01-30 PROCEDURE — 82550 ASSAY OF CK (CPK): CPT | Performed by: INTERNAL MEDICINE

## 2023-01-30 PROCEDURE — 25010000002 HEPARIN (PORCINE) PER 1000 UNITS: Performed by: INTERNAL MEDICINE

## 2023-01-30 PROCEDURE — 97165 OT EVAL LOW COMPLEX 30 MIN: CPT

## 2023-01-30 PROCEDURE — 80053 COMPREHEN METABOLIC PANEL: CPT | Performed by: INTERNAL MEDICINE

## 2023-01-30 PROCEDURE — 70551 MRI BRAIN STEM W/O DYE: CPT

## 2023-01-30 PROCEDURE — 70544 MR ANGIOGRAPHY HEAD W/O DYE: CPT

## 2023-01-30 PROCEDURE — 82962 GLUCOSE BLOOD TEST: CPT

## 2023-01-30 PROCEDURE — 25010000002 CEFTRIAXONE PER 250 MG: Performed by: INTERNAL MEDICINE

## 2023-01-30 PROCEDURE — 99233 SBSQ HOSP IP/OBS HIGH 50: CPT | Performed by: INTERNAL MEDICINE

## 2023-01-30 RX ORDER — AMITRIPTYLINE HYDROCHLORIDE 10 MG/1
10 TABLET, FILM COATED ORAL NIGHTLY
Status: DISCONTINUED | OUTPATIENT
Start: 2023-01-30 | End: 2023-02-01 | Stop reason: HOSPADM

## 2023-01-30 RX ORDER — SODIUM CHLORIDE 9 MG/ML
250 INJECTION, SOLUTION INTRAVENOUS CONTINUOUS
Status: DISCONTINUED | OUTPATIENT
Start: 2023-01-30 | End: 2023-01-30

## 2023-01-30 RX ORDER — ASPIRIN 81 MG/1
81 TABLET, CHEWABLE ORAL DAILY
Status: DISCONTINUED | OUTPATIENT
Start: 2023-01-30 | End: 2023-02-01 | Stop reason: HOSPADM

## 2023-01-30 RX ORDER — ATORVASTATIN CALCIUM 10 MG/1
10 TABLET, FILM COATED ORAL NIGHTLY
Status: DISCONTINUED | OUTPATIENT
Start: 2023-01-30 | End: 2023-01-30

## 2023-01-30 RX ORDER — SODIUM CHLORIDE 9 MG/ML
40 INJECTION, SOLUTION INTRAVENOUS AS NEEDED
Status: DISCONTINUED | OUTPATIENT
Start: 2023-01-30 | End: 2023-02-01 | Stop reason: HOSPADM

## 2023-01-30 RX ORDER — LEVOTHYROXINE SODIUM 0.03 MG/1
25 TABLET ORAL DAILY
Status: DISCONTINUED | OUTPATIENT
Start: 2023-01-30 | End: 2023-02-01 | Stop reason: HOSPADM

## 2023-01-30 RX ORDER — CEFTRIAXONE SODIUM 1 G/50ML
1 INJECTION, SOLUTION INTRAVENOUS DAILY
Status: COMPLETED | OUTPATIENT
Start: 2023-01-30 | End: 2023-02-01

## 2023-01-30 RX ORDER — METOPROLOL SUCCINATE 25 MG/1
25 TABLET, EXTENDED RELEASE ORAL DAILY
Status: DISCONTINUED | OUTPATIENT
Start: 2023-01-30 | End: 2023-01-31

## 2023-01-30 RX ORDER — LABETALOL HYDROCHLORIDE 5 MG/ML
10 INJECTION, SOLUTION INTRAVENOUS EVERY 6 HOURS PRN
Status: DISCONTINUED | OUTPATIENT
Start: 2023-01-30 | End: 2023-02-01 | Stop reason: HOSPADM

## 2023-01-30 RX ORDER — SODIUM CHLORIDE 0.9 % (FLUSH) 0.9 %
10 SYRINGE (ML) INJECTION AS NEEDED
Status: DISCONTINUED | OUTPATIENT
Start: 2023-01-30 | End: 2023-02-01 | Stop reason: HOSPADM

## 2023-01-30 RX ORDER — AMLODIPINE BESYLATE 5 MG/1
5 TABLET ORAL
Status: DISCONTINUED | OUTPATIENT
Start: 2023-01-30 | End: 2023-02-01 | Stop reason: HOSPADM

## 2023-01-30 RX ORDER — SODIUM CHLORIDE 0.9 % (FLUSH) 0.9 %
10 SYRINGE (ML) INJECTION EVERY 12 HOURS SCHEDULED
Status: DISCONTINUED | OUTPATIENT
Start: 2023-01-30 | End: 2023-01-30

## 2023-01-30 RX ORDER — HYDROCODONE BITARTRATE AND ACETAMINOPHEN 10; 325 MG/1; MG/1
1 TABLET ORAL EVERY 8 HOURS PRN
Status: DISCONTINUED | OUTPATIENT
Start: 2023-01-30 | End: 2023-02-01 | Stop reason: HOSPADM

## 2023-01-30 RX ORDER — ATORVASTATIN CALCIUM 40 MG/1
80 TABLET, FILM COATED ORAL NIGHTLY
Status: DISCONTINUED | OUTPATIENT
Start: 2023-01-30 | End: 2023-01-31

## 2023-01-30 RX ADMIN — CEFTRIAXONE SODIUM 1 G: 1 INJECTION, SOLUTION INTRAVENOUS at 09:58

## 2023-01-30 RX ADMIN — ATORVASTATIN CALCIUM 80 MG: 40 TABLET, FILM COATED ORAL at 21:00

## 2023-01-30 RX ADMIN — HYDROCODONE BITARTRATE AND ACETAMINOPHEN 1 TABLET: 10; 325 TABLET ORAL at 11:06

## 2023-01-30 RX ADMIN — METOPROLOL SUCCINATE 25 MG: 25 TABLET, EXTENDED RELEASE ORAL at 09:57

## 2023-01-30 RX ADMIN — Medication 10 ML: at 09:58

## 2023-01-30 RX ADMIN — HEPARIN SODIUM 5000 UNITS: 5000 INJECTION INTRAVENOUS; SUBCUTANEOUS at 05:29

## 2023-01-30 RX ADMIN — ACETAMINOPHEN 650 MG: 325 TABLET ORAL at 15:05

## 2023-01-30 RX ADMIN — LEVOTHYROXINE SODIUM 25 MCG: 25 TABLET ORAL at 09:57

## 2023-01-30 RX ADMIN — SODIUM CHLORIDE 1000 ML: 9 INJECTION, SOLUTION INTRAVENOUS at 04:15

## 2023-01-30 RX ADMIN — AMITRIPTYLINE HYDROCHLORIDE 10 MG: 10 TABLET, FILM COATED ORAL at 21:00

## 2023-01-30 RX ADMIN — AMLODIPINE BESYLATE 5 MG: 5 TABLET ORAL at 19:20

## 2023-01-30 RX ADMIN — HEPARIN SODIUM 5000 UNITS: 5000 INJECTION INTRAVENOUS; SUBCUTANEOUS at 21:00

## 2023-01-30 RX ADMIN — ACETAMINOPHEN 650 MG: 325 TABLET ORAL at 03:51

## 2023-01-30 RX ADMIN — Medication 10 ML: at 21:00

## 2023-01-30 RX ADMIN — ASPIRIN 81 MG: 81 TABLET, CHEWABLE ORAL at 19:20

## 2023-01-30 RX ADMIN — HEPARIN SODIUM 5000 UNITS: 5000 INJECTION INTRAVENOUS; SUBCUTANEOUS at 14:37

## 2023-01-30 RX ADMIN — LABETALOL HYDROCHLORIDE 10 MG: 5 INJECTION, SOLUTION INTRAVENOUS at 12:24

## 2023-01-30 RX ADMIN — SODIUM CHLORIDE 250 ML/HR: 9 INJECTION, SOLUTION INTRAVENOUS at 05:26

## 2023-01-31 PROBLEM — N17.0 ACUTE KIDNEY INJURY (AKI) WITH ACUTE TUBULAR NECROSIS (ATN): Status: ACTIVE | Noted: 2023-01-31

## 2023-01-31 LAB
ANION GAP SERPL CALCULATED.3IONS-SCNC: 9.9 MMOL/L (ref 5–15)
BUN SERPL-MCNC: 10 MG/DL (ref 6–20)
BUN/CREAT SERPL: 10.3 (ref 7–25)
CALCIUM SPEC-SCNC: 9 MG/DL (ref 8.6–10.5)
CHLORIDE SERPL-SCNC: 106 MMOL/L (ref 98–107)
CHOLEST SERPL-MCNC: 123 MG/DL (ref 0–200)
CO2 SERPL-SCNC: 25.1 MMOL/L (ref 22–29)
CREAT SERPL-MCNC: 0.97 MG/DL (ref 0.57–1)
DEPRECATED RDW RBC AUTO: 45.7 FL (ref 37–54)
EGFRCR SERPLBLD CKD-EPI 2021: 69.6 ML/MIN/1.73
ERYTHROCYTE [DISTWIDTH] IN BLOOD BY AUTOMATED COUNT: 13.4 % (ref 12.3–15.4)
GLUCOSE SERPL-MCNC: 92 MG/DL (ref 65–99)
HBA1C MFR BLD: 6 % (ref 4.8–5.6)
HCT VFR BLD AUTO: 36.5 % (ref 34–46.6)
HDLC SERPL-MCNC: 44 MG/DL (ref 40–60)
HGB BLD-MCNC: 12.5 G/DL (ref 12–15.9)
LDLC SERPL CALC-MCNC: 54 MG/DL (ref 0–100)
LDLC/HDLC SERPL: 1.13 {RATIO}
MAGNESIUM SERPL-MCNC: 1.8 MG/DL (ref 1.6–2.6)
MCH RBC QN AUTO: 31.8 PG (ref 26.6–33)
MCHC RBC AUTO-ENTMCNC: 34.2 G/DL (ref 31.5–35.7)
MCV RBC AUTO: 92.9 FL (ref 79–97)
PLATELET # BLD AUTO: 176 10*3/MM3 (ref 140–450)
PMV BLD AUTO: 10.8 FL (ref 6–12)
POTASSIUM SERPL-SCNC: 4.1 MMOL/L (ref 3.5–5.2)
QT INTERVAL: 361 MS
RBC # BLD AUTO: 3.93 10*6/MM3 (ref 3.77–5.28)
SODIUM SERPL-SCNC: 141 MMOL/L (ref 136–145)
TRIGL SERPL-MCNC: 147 MG/DL (ref 0–150)
VLDLC SERPL-MCNC: 25 MG/DL (ref 5–40)
WBC NRBC COR # BLD: 6.47 10*3/MM3 (ref 3.4–10.8)

## 2023-01-31 PROCEDURE — 83735 ASSAY OF MAGNESIUM: CPT | Performed by: INTERNAL MEDICINE

## 2023-01-31 PROCEDURE — 25010000002 CEFTRIAXONE PER 250 MG: Performed by: INTERNAL MEDICINE

## 2023-01-31 PROCEDURE — 92610 EVALUATE SWALLOWING FUNCTION: CPT

## 2023-01-31 PROCEDURE — 94760 N-INVAS EAR/PLS OXIMETRY 1: CPT

## 2023-01-31 PROCEDURE — 80048 BASIC METABOLIC PNL TOTAL CA: CPT | Performed by: INTERNAL MEDICINE

## 2023-01-31 PROCEDURE — 83036 HEMOGLOBIN GLYCOSYLATED A1C: CPT | Performed by: INTERNAL MEDICINE

## 2023-01-31 PROCEDURE — 25010000002 HEPARIN (PORCINE) PER 1000 UNITS: Performed by: INTERNAL MEDICINE

## 2023-01-31 PROCEDURE — 94799 UNLISTED PULMONARY SVC/PX: CPT

## 2023-01-31 PROCEDURE — 85027 COMPLETE CBC AUTOMATED: CPT | Performed by: INTERNAL MEDICINE

## 2023-01-31 PROCEDURE — 80061 LIPID PANEL: CPT | Performed by: INTERNAL MEDICINE

## 2023-01-31 PROCEDURE — 99233 SBSQ HOSP IP/OBS HIGH 50: CPT | Performed by: STUDENT IN AN ORGANIZED HEALTH CARE EDUCATION/TRAINING PROGRAM

## 2023-01-31 RX ORDER — NEBIVOLOL 10 MG/1
10 TABLET ORAL
Status: DISCONTINUED | OUTPATIENT
Start: 2023-01-31 | End: 2023-02-01 | Stop reason: HOSPADM

## 2023-01-31 RX ORDER — PRAVASTATIN SODIUM 40 MG
40 TABLET ORAL NIGHTLY
Status: DISCONTINUED | OUTPATIENT
Start: 2023-01-31 | End: 2023-02-01

## 2023-01-31 RX ORDER — CLOPIDOGREL BISULFATE 75 MG/1
75 TABLET ORAL DAILY
Status: DISCONTINUED | OUTPATIENT
Start: 2023-01-31 | End: 2023-02-01 | Stop reason: HOSPADM

## 2023-01-31 RX ORDER — LOSARTAN POTASSIUM 25 MG/1
25 TABLET ORAL 2 TIMES DAILY
Status: DISCONTINUED | OUTPATIENT
Start: 2023-01-31 | End: 2023-02-01

## 2023-01-31 RX ORDER — PRAVASTATIN SODIUM 20 MG
20 TABLET ORAL NIGHTLY
Status: DISCONTINUED | OUTPATIENT
Start: 2023-01-31 | End: 2023-01-31

## 2023-01-31 RX ADMIN — LOSARTAN POTASSIUM 25 MG: 25 TABLET, FILM COATED ORAL at 21:45

## 2023-01-31 RX ADMIN — HEPARIN SODIUM 5000 UNITS: 5000 INJECTION INTRAVENOUS; SUBCUTANEOUS at 13:38

## 2023-01-31 RX ADMIN — Medication 10 ML: at 09:24

## 2023-01-31 RX ADMIN — PRAVASTATIN SODIUM 40 MG: 40 TABLET ORAL at 21:45

## 2023-01-31 RX ADMIN — HEPARIN SODIUM 5000 UNITS: 5000 INJECTION INTRAVENOUS; SUBCUTANEOUS at 21:45

## 2023-01-31 RX ADMIN — Medication 10 ML: at 21:45

## 2023-01-31 RX ADMIN — CEFTRIAXONE SODIUM 1 G: 1 INJECTION, SOLUTION INTRAVENOUS at 09:23

## 2023-01-31 RX ADMIN — HEPARIN SODIUM 5000 UNITS: 5000 INJECTION INTRAVENOUS; SUBCUTANEOUS at 06:35

## 2023-01-31 RX ADMIN — CLOPIDOGREL BISULFATE 75 MG: 75 TABLET ORAL at 13:38

## 2023-01-31 RX ADMIN — AMLODIPINE BESYLATE 5 MG: 5 TABLET ORAL at 09:24

## 2023-01-31 RX ADMIN — METOPROLOL SUCCINATE 25 MG: 25 TABLET, EXTENDED RELEASE ORAL at 09:23

## 2023-01-31 RX ADMIN — NEBIVOLOL 10 MG: 10 TABLET ORAL at 16:54

## 2023-01-31 RX ADMIN — HYDROCODONE BITARTRATE AND ACETAMINOPHEN 1 TABLET: 10; 325 TABLET ORAL at 13:45

## 2023-01-31 RX ADMIN — LEVOTHYROXINE SODIUM 25 MCG: 25 TABLET ORAL at 09:23

## 2023-01-31 RX ADMIN — HYDROCODONE BITARTRATE AND ACETAMINOPHEN 1 TABLET: 10; 325 TABLET ORAL at 21:45

## 2023-01-31 RX ADMIN — AMITRIPTYLINE HYDROCHLORIDE 10 MG: 10 TABLET, FILM COATED ORAL at 21:45

## 2023-01-31 RX ADMIN — LOSARTAN POTASSIUM 25 MG: 25 TABLET, FILM COATED ORAL at 13:38

## 2023-01-31 RX ADMIN — ASPIRIN 81 MG: 81 TABLET, CHEWABLE ORAL at 09:23

## 2023-02-01 ENCOUNTER — APPOINTMENT (OUTPATIENT)
Dept: NUCLEAR MEDICINE | Facility: HOSPITAL | Age: 53
DRG: 682 | End: 2023-02-01
Payer: MEDICARE

## 2023-02-01 ENCOUNTER — READMISSION MANAGEMENT (OUTPATIENT)
Dept: CALL CENTER | Facility: HOSPITAL | Age: 53
End: 2023-02-01
Payer: MEDICARE

## 2023-02-01 VITALS
WEIGHT: 195.55 LBS | HEART RATE: 76 BPM | SYSTOLIC BLOOD PRESSURE: 151 MMHG | HEIGHT: 66 IN | BODY MASS INDEX: 31.43 KG/M2 | TEMPERATURE: 97.7 F | DIASTOLIC BLOOD PRESSURE: 77 MMHG | RESPIRATION RATE: 16 BRPM | OXYGEN SATURATION: 100 %

## 2023-02-01 LAB
ABO GROUP BLD: NORMAL
ALBUMIN SERPL-MCNC: 3.7 G/DL (ref 3.5–5.2)
ALBUMIN/GLOB SERPL: 1.1 G/DL
ALP SERPL-CCNC: 86 U/L (ref 39–117)
ALT SERPL W P-5'-P-CCNC: 37 U/L (ref 1–33)
ANION GAP SERPL CALCULATED.3IONS-SCNC: 12.4 MMOL/L (ref 5–15)
APTT PPP: 32.9 SECONDS (ref 24.2–34.2)
AST SERPL-CCNC: 44 U/L (ref 1–32)
BASOPHILS # BLD AUTO: 0.04 10*3/MM3 (ref 0–0.2)
BASOPHILS NFR BLD AUTO: 0.7 % (ref 0–1.5)
BILIRUB SERPL-MCNC: 0.3 MG/DL (ref 0–1.2)
BUN SERPL-MCNC: 10 MG/DL (ref 6–20)
BUN/CREAT SERPL: 10.5 (ref 7–25)
CALCIUM SPEC-SCNC: 9.3 MG/DL (ref 8.6–10.5)
CHLORIDE SERPL-SCNC: 102 MMOL/L (ref 98–107)
CO2 SERPL-SCNC: 24.6 MMOL/L (ref 22–29)
CREAT SERPL-MCNC: 0.95 MG/DL (ref 0.57–1)
DEPRECATED RDW RBC AUTO: 46.5 FL (ref 37–54)
EGFRCR SERPLBLD CKD-EPI 2021: 71.3 ML/MIN/1.73
EOSINOPHIL # BLD AUTO: 0.12 10*3/MM3 (ref 0–0.4)
EOSINOPHIL NFR BLD AUTO: 2.2 % (ref 0.3–6.2)
ERYTHROCYTE [DISTWIDTH] IN BLOOD BY AUTOMATED COUNT: 13.5 % (ref 12.3–15.4)
GLOBULIN UR ELPH-MCNC: 3.4 GM/DL
GLUCOSE SERPL-MCNC: 131 MG/DL (ref 65–99)
HCT VFR BLD AUTO: 37.8 % (ref 34–46.6)
HGB BLD-MCNC: 12.7 G/DL (ref 12–15.9)
IMM GRANULOCYTES # BLD AUTO: 0.01 10*3/MM3 (ref 0–0.05)
IMM GRANULOCYTES NFR BLD AUTO: 0.2 % (ref 0–0.5)
INR PPP: 1.02 (ref 0.86–1.15)
LYMPHOCYTES # BLD AUTO: 2.85 10*3/MM3 (ref 0.7–3.1)
LYMPHOCYTES NFR BLD AUTO: 51.4 % (ref 19.6–45.3)
MAGNESIUM SERPL-MCNC: 1.8 MG/DL (ref 1.6–2.6)
MCH RBC QN AUTO: 31.7 PG (ref 26.6–33)
MCHC RBC AUTO-ENTMCNC: 33.6 G/DL (ref 31.5–35.7)
MCV RBC AUTO: 94.3 FL (ref 79–97)
MONOCYTES # BLD AUTO: 0.37 10*3/MM3 (ref 0.1–0.9)
MONOCYTES NFR BLD AUTO: 6.7 % (ref 5–12)
NEUTROPHILS NFR BLD AUTO: 2.16 10*3/MM3 (ref 1.7–7)
NEUTROPHILS NFR BLD AUTO: 38.8 % (ref 42.7–76)
NRBC BLD AUTO-RTO: 0 /100 WBC (ref 0–0.2)
PHOSPHATE SERPL-MCNC: 3 MG/DL (ref 2.5–4.5)
PLATELET # BLD AUTO: 193 10*3/MM3 (ref 140–450)
PMV BLD AUTO: 11.6 FL (ref 6–12)
POTASSIUM SERPL-SCNC: 3.7 MMOL/L (ref 3.5–5.2)
PROT SERPL-MCNC: 7.1 G/DL (ref 6–8.5)
PROTHROMBIN TIME: 13.5 SECONDS (ref 11.8–14.9)
RBC # BLD AUTO: 4.01 10*6/MM3 (ref 3.77–5.28)
RH BLD: POSITIVE
SODIUM SERPL-SCNC: 139 MMOL/L (ref 136–145)
WBC NRBC COR # BLD: 5.55 10*3/MM3 (ref 3.4–10.8)

## 2023-02-01 PROCEDURE — 25010000002 CEFTRIAXONE PER 250 MG: Performed by: INTERNAL MEDICINE

## 2023-02-01 PROCEDURE — 83735 ASSAY OF MAGNESIUM: CPT | Performed by: STUDENT IN AN ORGANIZED HEALTH CARE EDUCATION/TRAINING PROGRAM

## 2023-02-01 PROCEDURE — A9502 TC99M TETROFOSMIN: HCPCS | Performed by: STUDENT IN AN ORGANIZED HEALTH CARE EDUCATION/TRAINING PROGRAM

## 2023-02-01 PROCEDURE — 99239 HOSP IP/OBS DSCHRG MGMT >30: CPT | Performed by: STUDENT IN AN ORGANIZED HEALTH CARE EDUCATION/TRAINING PROGRAM

## 2023-02-01 PROCEDURE — 85025 COMPLETE CBC W/AUTO DIFF WBC: CPT | Performed by: STUDENT IN AN ORGANIZED HEALTH CARE EDUCATION/TRAINING PROGRAM

## 2023-02-01 PROCEDURE — 0 TECHNETIUM TETROFOSMIN KIT: Performed by: STUDENT IN AN ORGANIZED HEALTH CARE EDUCATION/TRAINING PROGRAM

## 2023-02-01 PROCEDURE — 25010000002 REGADENOSON 0.4 MG/5ML SOLUTION: Performed by: STUDENT IN AN ORGANIZED HEALTH CARE EDUCATION/TRAINING PROGRAM

## 2023-02-01 PROCEDURE — 78452 HT MUSCLE IMAGE SPECT MULT: CPT

## 2023-02-01 PROCEDURE — 25010000002 HEPARIN (PORCINE) PER 1000 UNITS: Performed by: INTERNAL MEDICINE

## 2023-02-01 PROCEDURE — 85730 THROMBOPLASTIN TIME PARTIAL: CPT | Performed by: INTERNAL MEDICINE

## 2023-02-01 PROCEDURE — 86900 BLOOD TYPING SEROLOGIC ABO: CPT | Performed by: INTERNAL MEDICINE

## 2023-02-01 PROCEDURE — 25010000002 FUROSEMIDE PER 20 MG: Performed by: INTERNAL MEDICINE

## 2023-02-01 PROCEDURE — 86901 BLOOD TYPING SEROLOGIC RH(D): CPT | Performed by: INTERNAL MEDICINE

## 2023-02-01 PROCEDURE — 93017 CV STRESS TEST TRACING ONLY: CPT

## 2023-02-01 PROCEDURE — 80053 COMPREHEN METABOLIC PANEL: CPT | Performed by: STUDENT IN AN ORGANIZED HEALTH CARE EDUCATION/TRAINING PROGRAM

## 2023-02-01 PROCEDURE — 84100 ASSAY OF PHOSPHORUS: CPT | Performed by: STUDENT IN AN ORGANIZED HEALTH CARE EDUCATION/TRAINING PROGRAM

## 2023-02-01 PROCEDURE — 85610 PROTHROMBIN TIME: CPT | Performed by: INTERNAL MEDICINE

## 2023-02-01 RX ORDER — ATORVASTATIN CALCIUM 40 MG/1
40 TABLET, FILM COATED ORAL NIGHTLY
Status: DISCONTINUED | OUTPATIENT
Start: 2023-02-01 | End: 2023-02-01 | Stop reason: HOSPADM

## 2023-02-01 RX ORDER — LOSARTAN POTASSIUM 50 MG/1
50 TABLET ORAL 2 TIMES DAILY
Status: DISCONTINUED | OUTPATIENT
Start: 2023-02-01 | End: 2023-02-01 | Stop reason: HOSPADM

## 2023-02-01 RX ORDER — NEBIVOLOL 10 MG/1
10 TABLET ORAL
Qty: 30 TABLET | Refills: 0 | Status: SHIPPED | OUTPATIENT
Start: 2023-02-02 | End: 2023-03-04

## 2023-02-01 RX ORDER — CLOPIDOGREL BISULFATE 75 MG/1
75 TABLET ORAL DAILY
Qty: 30 TABLET | Refills: 0 | Status: SHIPPED | OUTPATIENT
Start: 2023-02-02 | End: 2023-03-04

## 2023-02-01 RX ORDER — ASPIRIN 81 MG/1
81 TABLET, CHEWABLE ORAL DAILY
Qty: 30 TABLET | Refills: 0 | Status: SHIPPED | OUTPATIENT
Start: 2023-02-02 | End: 2023-03-04

## 2023-02-01 RX ORDER — AMLODIPINE BESYLATE 5 MG/1
5 TABLET ORAL
Qty: 30 TABLET | Refills: 0 | Status: SHIPPED | OUTPATIENT
Start: 2023-02-02 | End: 2023-03-04

## 2023-02-01 RX ORDER — FUROSEMIDE 10 MG/ML
40 INJECTION INTRAMUSCULAR; INTRAVENOUS ONCE
Status: COMPLETED | OUTPATIENT
Start: 2023-02-01 | End: 2023-02-01

## 2023-02-01 RX ORDER — ATORVASTATIN CALCIUM 40 MG/1
40 TABLET, FILM COATED ORAL NIGHTLY
Qty: 30 TABLET | Refills: 0 | Status: SHIPPED | OUTPATIENT
Start: 2023-02-01 | End: 2023-03-03

## 2023-02-01 RX ORDER — LOSARTAN POTASSIUM 50 MG/1
50 TABLET ORAL 2 TIMES DAILY
Status: DISCONTINUED | OUTPATIENT
Start: 2023-02-01 | End: 2023-02-01

## 2023-02-01 RX ORDER — LOSARTAN POTASSIUM 50 MG/1
50 TABLET ORAL 2 TIMES DAILY
Qty: 60 TABLET | Refills: 0 | Status: SHIPPED | OUTPATIENT
Start: 2023-02-01 | End: 2023-03-03

## 2023-02-01 RX ADMIN — NEBIVOLOL 10 MG: 10 TABLET ORAL at 14:12

## 2023-02-01 RX ADMIN — ASPIRIN 81 MG: 81 TABLET, CHEWABLE ORAL at 14:12

## 2023-02-01 RX ADMIN — HEPARIN SODIUM 5000 UNITS: 5000 INJECTION INTRAVENOUS; SUBCUTANEOUS at 06:28

## 2023-02-01 RX ADMIN — FUROSEMIDE 40 MG: 10 INJECTION, SOLUTION INTRAMUSCULAR; INTRAVENOUS at 13:48

## 2023-02-01 RX ADMIN — CLOPIDOGREL BISULFATE 75 MG: 75 TABLET ORAL at 14:12

## 2023-02-01 RX ADMIN — Medication 10 ML: at 14:14

## 2023-02-01 RX ADMIN — TETROFOSMIN 1 DOSE: 1.38 INJECTION, POWDER, LYOPHILIZED, FOR SOLUTION INTRAVENOUS at 12:15

## 2023-02-01 RX ADMIN — TETROFOSMIN 1 DOSE: 1.38 INJECTION, POWDER, LYOPHILIZED, FOR SOLUTION INTRAVENOUS at 10:38

## 2023-02-01 RX ADMIN — HEPARIN SODIUM 5000 UNITS: 5000 INJECTION INTRAVENOUS; SUBCUTANEOUS at 14:12

## 2023-02-01 RX ADMIN — LEVOTHYROXINE SODIUM 25 MCG: 25 TABLET ORAL at 14:12

## 2023-02-01 RX ADMIN — CEFTRIAXONE SODIUM 1 G: 1 INJECTION, SOLUTION INTRAVENOUS at 08:17

## 2023-02-01 RX ADMIN — REGADENOSON 0.4 MG: 0.08 INJECTION, SOLUTION INTRAVENOUS at 12:04

## 2023-02-01 RX ADMIN — AMLODIPINE BESYLATE 5 MG: 5 TABLET ORAL at 14:12

## 2023-02-01 NOTE — DISCHARGE SUMMARY
[facility]         HOSPITALIST  DISCHARGE SUMMARY    Patient Name: Davidson Lyle  : 1970  MRN: [id number]    Date of Admission: 2023  Date of Discharge: 2023  Primary Care Physician: Madi Monson MD    Consults     Date and Time Order Name Status Description    2023  7:46 AM Hematology & Oncology Inpatient Consult      2023  6:07 PM Inpatient Neurology Consult Stroke Completed     2023  7:56 AM Inpatient Nephrology Consult      2023  7:53 AM Inpatient Neurology Consult Stroke      2023  7:26 PM Inpatient Cardiology Consult Completed     2023  7:24 PM Inpatient Cardiology Consult      2023  6:36 PM Inpatient Hospitalist Consult            Active and Resolved Hospital Problems:  Active Hospital Problems    Diagnosis POA   • **HAIM (acute kidney injury) (HCC) [N17.9] Yes   • Acute kidney injury (HAIM) with acute tubular necrosis (ATN) (HCC) [N17.0] Yes   • Essential hypertension [I10] Unknown   • Chronic pain syndrome [G89.4] Unknown   • Obesity [E66.9] Unknown   • Hyponatremia [E87.1] Unknown      Resolved Hospital Problems   No resolved problems to display.       Hospital Course     Hospital Course:  Davidson Lyle is a 52 y.o. female who presented with chief complaint of being found down.  Upon initial evaluation patient found to have urine tox screen positive for opiates and methadone.  Patient stated that she wear abusing any methadone per her report.  She had an elevation in her CK and creatinine.  He also had evidence of urinary tract infection on urinalysis.  She did a CT that showed diminished density in basal ganglia region on the left possibly due to subacute or old infarct.  Patient then had MRI that showed acute to subacute infarct in the right hippocampus with background of chronic lacunar infarcts.  It was recommended that the patient be started on aspirin and Plavix given her risk of further CVAs in the future.   Patient also had her atorvastatin increased to high intensity.  Neurology evaluated the patient and despite patient being at risk for bleeding it was deemed the benefits outweigh the risks.  Patient will follow-up with neurology as outpatient for further monitoring while she is on dual antiplatelet therapy.  Patient also counseled on this and was agreeable to starting dual antiplatelet therapy.  Patient was also evaluated by nephrology and her renal function improved to normal prior to discharge.  Cardiology was also consulted given patient had evidence of wiki block on EKG.  She ultimately underwent myocardial perfusion study that showed evidence of an apical infarct.  Patient will be discharged on aspirin, statin, and beta-blocker therapy.  She will follow-up with cardiology per their recommendations.  She will follow-up with her PCP in 1 week.  She will continue her other home medications as before.  She was discharged in stable condition.      Discharge problem list:  Acute to subacute infarct in the right hippocampus with background of chronic lacunar infarcts  Acute renal failure   Syncope  Concern for acute CVA  Hyponatremia  Wenckebach block  Urinary tract infection due to unknown organism  Essential hypertension  Chronic pain  Obesity (BMI >30)      DISCHARGE Follow Up Recommendations for labs and diagnostics: PCP 1 week.  Cardiology as recommended.  Neurology as recommended.      Day of Discharge     Vital Signs:  Temp:  [97.3 °F (36.3 °C)-98.6 °F (37 °C)] 97.7 °F (36.5 °C)  Heart Rate:  [60-80] 76  Resp:  [16] 16  BP: (151-175)/(64-82) 151/77  Physical Exam:   Constitutional: Alert, awake, no acute distress  HEENT:  PERRLA, EOMI; No Scleral icterus  Neck:  Supple; No Mass, No Lymphadenopathy  Cardiovascular:  No Rubs, No Edema, No JVD  Respiratory: No respiratory distress, unlabored breathing, saturating well on room air  Abdomen:  Normal BS all 4 Quadrants; No Guarding, No Tenderness  Musculoskeletal:   Pulses Positive all 4 Ext; No Cyanosis, No Edema  Neurological:  Alert+Ox3, Mental status WNL; No Dysarthria  Skin:  No Rash, No Cellulitis, No Erythema      Discharge Details        Discharge Medications      New Medications      Instructions Start Date   amLODIPine 5 MG tablet  Commonly known as: NORVASC   5 mg, Oral, Every 24 Hours Scheduled   Start Date: February 2, 2023     aspirin 81 MG chewable tablet   81 mg, Oral, Daily   Start Date: February 2, 2023     clopidogrel 75 MG tablet  Commonly known as: PLAVIX   75 mg, Oral, Daily   Start Date: February 2, 2023     nebivolol 10 MG tablet  Commonly known as: BYSTOLIC   10 mg, Oral, Every 24 Hours Scheduled   Start Date: February 2, 2023        Changes to Medications      Instructions Start Date   atorvastatin 40 MG tablet  Commonly known as: LIPITOR  What changed:   · medication strength  · how much to take   40 mg, Oral, Nightly      losartan 50 MG tablet  Commonly known as: COZAAR  What changed:   · medication strength  · how much to take   50 mg, Oral, 2 Times Daily         Continue These Medications      Instructions Start Date   amitriptyline 10 MG tablet  Commonly known as: ELAVIL   10 mg, Oral, Nightly      cyanocobalamin 1000 MCG/ML injection   1 mL, Subcutaneous, Every 14 Days      ergocalciferol 1.25 MG (99790 UT) capsule  Commonly known as: ERGOCALCIFEROL   50,000 Units, Oral, Weekly      famotidine 20 MG tablet  Commonly known as: PEPCID   20 mg, Oral, Nightly PRN      gabapentin 800 MG tablet  Commonly known as: NEURONTIN   800 mg, Oral, 4 Times Daily      HYDROcodone-acetaminophen  MG per tablet  Commonly known as: NORCO   1 tablet, Oral, Every 8 Hours PRN      levothyroxine 25 MCG tablet  Commonly known as: SYNTHROID, LEVOTHROID   1 tablet, Oral, Daily      lidocaine 5 %  Commonly known as: LIDODERM   1 patch, Transdermal, Daily, May wear up to 12 hours      tiZANidine 2 MG tablet  Commonly known as: ZANAFLEX   4 mg, Every 12 Hours  Scheduled         Stop These Medications    metoprolol succinate XL 25 MG 24 hr tablet  Commonly known as: TOPROL-XL            Allergies   Allergen Reactions   • Duloxetine Hcl Hallucinations   • Baclofen Unknown - Low Severity   • Metaxalone Unknown - Low Severity   • Sulfa Antibiotics Unknown - Low Severity   • Latex Rash       Discharge Disposition:  Home or Self Care    Diet:  Hospital:  Diet Order   Procedures   • Diet: Regular/House Diet; Texture: Regular Texture (IDDSI 7); Fluid Consistency: Thin (IDDSI 0)       Discharge Activity:       CODE STATUS:  Code Status and Medical Interventions:   Ordered at: 01/29/23 1918     Code Status (Patient has no pulse and is not breathing):    CPR (Attempt to Resuscitate)     Medical Interventions (Patient has pulse or is breathing):    Full Support         No future appointments.    Additional Instructions for the Follow-ups that You Need to Schedule     Discharge Follow-up with PCP   As directed       Currently Documented PCP:    Madi Monson MD    PCP Phone Number:    [phone]     Follow Up Details: 1 wk         Discharge Follow-up with Specified Provider: Cardiology as directed.   As directed      To: Cardiology as directed.         Discharge Follow-up with Specified Provider: Neurology as scheduled.   As directed      To: Neurology as scheduled.         Factor 8 Activity    Feb 04, 2023 (Approximate)      Release to patient: Routine Release         Von Willebrand Antigen    Feb 04, 2023 (Approximate)      Release to patient: Routine Release         Von Willebrand Factor Activity    Feb 04, 2023 (Approximate)      Release to patient: Routine Release         Von Willebrand Multimeric    Feb 04, 2023 (Approximate)      Release to patient: Routine Release         aPTT    Feb 04, 2023 (Approximate)      Patient Receiving Heparin Therapy?: No    Release to patient: Routine Release               Pertinent  and/or Most Recent Results     PROCEDURES:   STAT Adult  Transthoracic Echo Complete W/ Cont if Necessary Per Protocol    Result Date: 1/30/2023  Narrative: •  Left ventricular systolic function is hyperdynamic (EF > 70%). Calculated left ventricular EF = 74% •  Left ventricular diastolic function is consistent with (grade I) impaired relaxation.     EEG    Result Date: 1/30/2023  Narrative: Clinical history: 52-year-old woman evaluated for confusion. EEG description: This is a portable 19 channel EEG recording using the 10/20 international classification of electrode placement. EEG report: The underlying background activity consists of an admixture of frequencies.  7 to 8 Hz activity amplitude of 30 µV is noted in the posterior regions.  There is intermixed 4 Hz activity amplitude of 30 µV noted in the frontal and central regions.  1 to 2 Hz delta activity amplitude of 2 to 80 µV noted in the frontal regions.  Muscle artifacts and contamination is noted throughout the entire recording.  There were no epileptiform discharges that was recorded.  Photic stimulation did not activate any abnormalities. Impression: This EEG which was recorded during wakefulness is abnormal secondary to slowing of the underlying background activity.  This finding is suggestive of a mild encephalopathy of nonspecific etiology.    CT Head Without Contrast    Result Date: 1/29/2023  Narrative: PROCEDURE: CT HEAD WO CONTRAST  COMPARISON:  [facility], CT, HEAD W/O CONTRAST, 2/26/2013, 15:19.  INDICATIONS: ALTERED MENTAL STATUS  PROTOCOL:   Standard imaging protocol performed    RADIATION:   DLP: 1588.3mGy*cm   MA and/or KV was adjusted to minimize radiation dose.    TECHNIQUE: CT images were obtained without non-ionic intravenous contrast material.  FINDINGS:  The ventricles are normal in size, position, and configuration.  Sulci are not abnormally prominent.  7 mm area of diminished density in the basal ganglia region on the right is stable, and may represent old infarct or dilated  perivascular space.  New 8 mm area of diminished density in the basal ganglia region on the left is not evident on the prior study, and may represent a subacute or old infarct.  There is no CT evidence of acute intracranial hemorrhage.  The orbits have a normal appearance.  The paranasal sinuses, middle ears, and mastoid air cells are well aerated.  No fractures are seen on bone window images.   CONTINUED ON NEXT PAGE...        Impression:   CT scan of the head without IV contrast demonstrating new 8 mm area of diminished density in the basal ganglia region on the left, which may represent a subacute or old infarct.     MARGE DICK MD       Electronically Signed and Approved By: MARGE DICK MD on 1/29/2023 at 16:09             MRI Angiogram Head Without Contrast    Result Date: 1/30/2023  Narrative: PROCEDURE: MRI ANGIOGRAM HEAD WO CONTRAST  COMPARISON: None  INDICATIONS: EVALAUTE FOR STROKE, PASSED OUT SEVERAL TIMES      TECHNIQUE: MR angiography was performed in the usual manner.  Multiplanar reconstructed 2D and 3D images of the cerebral arteries were created and interpreted.   FINDINGS:  Right carotid:  The right distal cervical ICA appears unremarkable.  The intracranial ICA segments do appear patent.  There is a patent posterior communicating artery.  Anterior communicating artery is not clearly defined.  The origin of the ophthalmic artery is not well seen, but no aneurysm can be identified.  The A1 and M1 segments appear patent.  There is probable high-grade stenosis at the proximal right MCA M2 segment anterior division with patent distal branches.  The posterior division appears intact.  The MIGUEL ANGEL branches appear normal.  Left carotid:  The distal cervical ICA is patent.  The intracranial ICA segments appear patent.  The left A1 segment appears hypoplastic and is not well characterized due to motion.  The left M1 segment is patent.  There appear to be atherosclerotic areas of luminal irregularity in  both M2 branches on the left although all MCA branches appear patent.  The left MIGUEL ANGEL branches appear patent.  No definite posterior communicating artery.  Posterior circulation:  Vertebral arteries are codominant.  V4 segments appear patent.  The basilar artery is normal.  The right P1 segment is hypoplastic.  There appear to be areas of atherosclerotic irregularity within both PCAs, but these vessels are difficult to further characterize due to motion.      Impression:   1. Mildly motion limited study. 2. There does appear to be a high-grade focal stenosis in the anterior division right M2 segment.  Distal MCA branches appear patent. 3. Multiple areas of atherosclerotic irregularity within the distal left MCA branches and both PCA branches, right greater than left.  These vessels are partially obscured by motion and further characterization is difficult.     HARRIET GRAY MD       Electronically Signed and Approved By: HARRIET GRAY MD on 1/30/2023 at 13:52             MRI Angiogram Neck Without Contrast    Result Date: 1/30/2023  Narrative: PROCEDURE: MRI ANGIOGRAM NECK WO CONTRAST  COMPARISON: None  INDICATIONS: NEURO DEFICIT, EVALUATE FOR STROKE, PASSED OUT SEVERAL TIMES  TECHNIQUE: MR angiography was performed without intravenous gadolinium, with creation and interpretation of 2D and 3D/multi-planar images of the carotid and vertebral arteries. Unless otherwise stated in this report, all vascular stenoses involving the internal carotid arteries reported for this examination are derived by dividing the lesion diameter by the diameter of the normal internal carotid artery more distally.  FINDINGS:  LEFT INTERNAL CAROTID: No hemodynamically significant stenosis or dissection.  EXTERNAL CAROTID: No hemodynamically significant stenosis or dissection.  COMMON CAROTID: No hemodynamically significant stenosis or dissection.  VERTEBRAL: No hemodynamically significant stenosis or dissection.   RIGHT INTERNAL CAROTID: No  hemodynamically significant stenosis or dissection.  EXTERNAL CAROTID: No hemodynamically significant stenosis or dissection.  COMMON CAROTID: No hemodynamically significant stenosis or dissection.  VERTEBRAL: No hemodynamically significant stenosis or dissection.   OTHER: The visualized soft tissues of the neck are also unremarkable.       Impression:  Major arterial vasculature within neck appears widely patent, with no hemodynamically significant stenosis or dissection.     ISABELA CEDENO MD       Electronically Signed and Approved By: ISABELA CEDENO MD on 1/30/2023 at 14:34             MRI Brain Without Contrast    Result Date: 1/30/2023  Narrative: PROCEDURE: MRI BRAIN WO CONTRAST  COMPARISON: [facility], MR, MRI ANGIOGRAM HEAD WO CONTRAST, 1/30/2023, 13:34.  INDICATIONS: EVALAUTE FOR STROKE, PASSED OUT SEVERAL TIMES      TECHNIQUE: A variety of imaging planes and parameters were utilized for visualization of suspected pathology.  Images were performed without contrast.  FINDINGS:  There are 2 small areas of diffusion restriction within the head and body of the hippocampus consistent with acute/subacute infarcts.  There are chronic lacunar infarctions in the basal ganglia bilaterally including the right lentiform nucleus, left internal capsule and right thalamus.  There is minimal periventricular white matter FLAIR hyperintense signal with patchy areas in the central milla.  There is no acute or chronic intracranial hemorrhage.  No mass effect or midline shift.  The midline structures appear intact.  Calvarial and superficial soft tissue signal is within normal limits.  Orbits appear unremarkable.  Paranasal sinuses and mastoid air cells appear well-aerated.      Impression:   1. Small areas of acute/subacute infarction in the right hippocampus. 2. Multiple chronic lacunar infarctions in both basal ganglia. 3. Minimal chronic small vessel ischemic change.      HARRIET GRAY MD        Electronically Signed and Approved By: HARRIET GRAY MD on 1/30/2023 at 14:09             XR Chest 1 View    Result Date: 1/29/2023  Narrative: PROCEDURE: XR CHEST 1 VW  COMPARISON: [facility], CR, XR CHEST 1 VW, 8/27/2021, 16:44.  INDICATIONS: ALTERED MENTAL STATUS  FINDINGS:  The heart is probably normal in size.  Low lung volumes are evident.  No airspace disease is seen.  Bony structures appear intact.      Impression:   Low lung volumes.  No active disease is seen.       MARGE DICK MD       Electronically Signed and Approved By: MARGE DICK MD on 1/29/2023 at 16:03                 LAB RESULTS:      Lab 02/01/23  0825 02/01/23  0426 01/31/23  0407 01/29/23  1524 01/29/23  1522   WBC  --  5.55 6.47  --  9.82   HEMOGLOBIN  --  12.7 12.5  --  12.9   HEMATOCRIT  --  37.8 36.5  --  38.7   PLATELETS  --  193 176  --  187   NEUTROS ABS  --  2.16  --   --  5.70   IMMATURE GRANS (ABS)  --  0.01  --   --  0.02   LYMPHS ABS  --  2.85  --   --  3.10   MONOS ABS  --  0.37  --   --  0.81   EOS ABS  --  0.12  --   --  0.15   MCV  --  94.3 92.9  --  93.7   LACTATE  --   --   --  1.2  --    PROTIME 13.5  --   --   --   --    APTT 32.9  --   --   --   --          Lab 02/01/23  0426 01/31/23  0407 01/30/23  1146 01/30/23  0518 01/29/23  2355 01/29/23  1635   SODIUM 139 141 143 140  --  130*   POTASSIUM 3.7 4.1 4.4 4.5  --  4.2   CHLORIDE 102 106 112* 108*  --  93*   CO2 24.6 25.1 21.6* 24.3  --  24.5   ANION GAP 12.4 9.9 9.4 7.7  --  12.5   BUN 10 10 19 22*  --  33*   CREATININE 0.95 0.97 1.50* 1.85*  --  3.43*   EGFR 71.3 69.6 41.2* 32.1*  --  15.3*   GLUCOSE 131* 92 118* 106*  --  113*   CALCIUM 9.3 9.0 8.4* 8.3*  --  8.5*   MAGNESIUM 1.8 1.8  --   --  2.4 2.5   PHOSPHORUS 3.0  --   --   --   --  5.9*   HEMOGLOBIN A1C  --  6.00*  --   --   --   --    TSH  --   --   --   --  2.290  --          Lab 02/01/23  0426 01/30/23  0518 01/29/23  1635   TOTAL PROTEIN 7.1 6.8 7.1   ALBUMIN 3.7 3.8 4.0   GLOBULIN 3.4  3.0 3.1   ALT (SGPT) 37* 58* 69*   AST (SGOT) 44* 131* 172*   BILIRUBIN 0.3 0.2 0.3   ALK PHOS 86 92 100         Lab 02/01/23  0825 01/29/23  1635   TROPONIN T  --  <0.010   PROTIME 13.5  --    INR 1.02  --          Lab 01/31/23  0407 01/30/23  0518   CHOLESTEROL 123 115   LDL CHOL 54 48   HDL CHOL 44 41   TRIGLYCERIDES 147 151*         Lab 02/01/23  0426   ABO TYPING A   RH TYPING Positive         Brief Urine Lab Results  (Last result in the past 365 days)      Color   Clarity   Blood   Leuk Est   Nitrite   Protein   CREAT   Urine HCG        01/29/23 1635 Yellow   Cloudy   Large (3+)   Negative   Negative   30 mg/dL (1+)               Microbiology Results (last 10 days)     Procedure Component Value - Date/Time    Blood Culture - Blood, Arm, Left [[id number]]  (Normal) Collected: 01/29/23 1856    Lab Status: Preliminary result Specimen: Blood from Arm, Left Updated: 01/31/23 1916     Blood Culture No growth at 2 days    Blood Culture - Blood, Arm, Left [[id number]]  (Normal) Collected: 01/29/23 1856    Lab Status: Preliminary result Specimen: Blood from Arm, Left Updated: 01/31/23 1916     Blood Culture No growth at 2 days          CT Head Without Contrast    Result Date: 1/29/2023  Impression:   CT scan of the head without IV contrast demonstrating new 8 mm area of diminished density in the basal ganglia region on the left, which may represent a subacute or old infarct.     MARGE DICK MD       Electronically Signed and Approved By: MARGE DICK MD on 1/29/2023 at 16:09             MRI Angiogram Head Without Contrast    Result Date: 1/30/2023  Impression:   1. Mildly motion limited study. 2. There does appear to be a high-grade focal stenosis in the anterior division right M2 segment.  Distal MCA branches appear patent. 3. Multiple areas of atherosclerotic irregularity within the distal left MCA branches and both PCA branches, right greater than left.  These vessels are partially obscured by motion and further  characterization is difficult.     HARRIET GRAY MD       Electronically Signed and Approved By: HARRIET GRAY MD on 1/30/2023 at 13:52             MRI Angiogram Neck Without Contrast    Result Date: 1/30/2023  Impression:  Major arterial vasculature within neck appears widely patent, with no hemodynamically significant stenosis or dissection.     ISABELA CEDENO MD       Electronically Signed and Approved By: ISABELA CEDENO MD on 1/30/2023 at 14:34             MRI Brain Without Contrast    Result Date: 1/30/2023  Impression:   1. Small areas of acute/subacute infarction in the right hippocampus. 2. Multiple chronic lacunar infarctions in both basal ganglia. 3. Minimal chronic small vessel ischemic change.      HARRIET GRAY MD       Electronically Signed and Approved By: HARRIET GRAY MD on 1/30/2023 at 14:09             XR Chest 1 View    Result Date: 1/29/2023  Impression:   Low lung volumes.  No active disease is seen.       MARGE DICK MD       Electronically Signed and Approved By: MARGE DICK MD on 1/29/2023 at 16:03                       Results for orders placed during the hospital encounter of 01/29/23    STAT Adult Transthoracic Echo Complete W/ Cont if Necessary Per Protocol    Interpretation Summary  •  Left ventricular systolic function is hyperdynamic (EF > 70%). Calculated left ventricular EF = 74%  •  Left ventricular diastolic function is consistent with (grade I) impaired relaxation.      Labs Pending at Discharge:  Pending Labs     Order Current Status    Blood Culture - Blood, Arm, Left Preliminary result    Blood Culture - Blood, Arm, Left Preliminary result            Time spent on Discharge including face to face service:  31 minutes    Electronically signed by Dago Manrique DO, 02/01/23, 4:42 PM EST.

## 2023-02-01 NOTE — PAYOR COMM NOTE
"Go Lyle (52 y.o. Female)     Date of Birth   1970    Social Security Number       Address   [address] [address]    Home Phone   [phone]    MRN   [id number]       [facility]   [facility]    Marital Status                               Admission Date   23    Admission Type   Emergency    Admitting Provider   Eliazar Ortega MD    Attending Provider   Dago Manrique DO    Department, Room/Bed   [facility],        Discharge Date       Discharge Disposition       Discharge Destination                               Attending Provider: Dago Manrique DO    Allergies: Duloxetine Hcl, Baclofen, Metaxalone, Sulfa Antibiotics, Latex    Isolation: None   Infection: None   Code Status: CPR    Ht: 167.6 cm (66\")   Wt: 88.7 kg (195 lb 8.8 oz)    Admission Cmt: None   Principal Problem: HAIM (acute kidney injury) (HCC) [N17.9]                 Active Insurance as of 2023     Primary Coverage     Payor Plan Insurance Group Employer/Plan Group    HUMANA MEDICARE REPLACEMENT HUMANA MEDICARE REPLACEMENT [id number]     Payor Plan Address Payor Plan Phone Number Payor Plan Fax Number Effective Dates    [address] [address] [phone]  2021 - None Entered    [facility] [address]       Subscriber Name Subscriber Birth Date Member ID       GO LYLE 1970 [id number]                 Emergency Contacts      (Rel.) Home Phone Work Phone Mobile Phone    HORTENSIA QUINTERO (Friend) -- -- [phone]      OBS TO INPT   #[id number]      [facility] ,[facility] [phone]  F [phone]       History & Physical      IvanLillie rdz MD at 23 0852           [facility]   HISTORY AND PHYSICAL    Patient Name: Go Lyle  : 1970  MRN: [id number]  Primary Care Physician:  Madi Monson MD  Date of admission: 2023    Subjective    Subjective     Chief " Complaint: Acute renal failure    HPI:    Davidson Lyle is a 52 y.o. female with past medical history significant for obesity hypertension PTSD von Willebrand disease chronic pain syndrome and she goes to the pain clinic was put on a lidocaine patch and then she started feeling weak and then she took 2 Benadryl's apparently patient probably passed out she does not remember anything and was brought to the emergency room by the family.  According to EMS patient was unconscious when they reached home.  Her urine tox screen positive for opioids and methadone.  Patient was hypotensive when she was brought to the emergency roomm systolic blood pressure is recorded in 90s.  Patient's baseline creatinine is 1.2-1.3 and this time it was 3.43 and thus the reason for the consultation.  When I see the patient she is fully awake alert oriented she actually walking in the room and has no complaints.  She does not seem to be volume depleted at all.  Her systolic blood pressure is in 170s right now and heart rate around 100.    Review of Systems  Constitutional:        Weakness tiredness fatigue  Eyes:                       No blurry vision, eye discharge, eye irritation, eye pain  HEENT:                   No acute hair loss, earache and discharge, nasal congestion or discharge, sore throat, postnasal drip  Respiratory:           No shortness of breath coughing sputum production wheezing hemoptysis pleuritic chest pain  Cardiovascular:     No chest pain, orthopnea, PND, dizziness, palpitation, lower extremity edema  Gastrointestinal:   No nausea vomiting diarrhea abdominal pain constipation  Genitourinary:       No urinary incontinence, hesitancy, frequency, urgency, dysuria  Neurological:        No confusion, headache, focal weakness, numbness, dysphasia  Hematologic:         No bruising, bleeding, pallor, lymphadenopathy  Endocrine:            No coldness, hot flashes, polyuria, abnormal hair growth  Musculoskeletal:     No body pains, aches, arthritic pains, muscle pain ,muscle wasting  Psychiatric:          No low or high mood, anxiety, hallucinations, delusions  Skin.                      No rash, ulcers, bruising, itching    Personal History     Past Medical History:   Diagnosis Date   • Depression    • Disease of thyroid gland    • Elevated cholesterol    • GERD (gastroesophageal reflux disease)    • Hypertension    • PONV (postoperative nausea and vomiting)    • PTSD (post-traumatic stress disorder)    • Von Willebrand disease        Past Surgical History:   Procedure Laterality Date   • BACK SURGERY     • COSMETIC SURGERY         Family History: family history includes Arthritis in her paternal grandmother; Early death in her father; Heart disease in her father; Hyperlipidemia in her father; Hypertension in her father. Otherwise pertinent FHx was reviewed and not pertinent to current issue.    Social History:  reports that she has been smoking cigarettes. She has a 8.50 pack-year smoking history. She does not have any smokeless tobacco history on file. She reports that she does not drink alcohol and does not use drugs.    Home Medications:  HYDROcodone-acetaminophen, amitriptyline, atorvastatin, cyanocobalamin, ergocalciferol, famotidine, gabapentin, levothyroxine, lidocaine, losartan, metoprolol succinate XL, and tiZANidine      Allergies:  Allergies   Allergen Reactions   • Duloxetine Hcl Hallucinations   • Baclofen Unknown - Low Severity   • Metaxalone Unknown - Low Severity   • Sulfa Antibiotics Unknown - Low Severity   • Latex Rash       Objective    Objective     Vitals:   Temp:  [98.8 °F (37.1 °C)-99 °F (37.2 °C)] 99 °F (37.2 °C)  Heart Rate:  [] 102  Resp:  [16-18] 18  BP: ()/(47-90) 178/64  Physical Exam               Constitutional:         Awake, alert responsive, conversant, no obvious distress   Eyes:                       PERRLA, sclerae anicteric, no conjunctival injection   HEENT:                    Moist mucous membranes, no nasal or eye discharge, no throat congestion   Neck:                      Supple, no thyromegaly, no lymphadenopathy, trachea midline, no elevated JVD   Respiratory:           Clear to auscultation bilaterally, nonlabored respirations    Cardiovascular:     Edema positive   Gastrointestinal:   Positive bowel sounds, soft, nontender, nondistended, no organomegaly   Musculoskeletal:   No clubbing or cyanosis to extremities, muscle wasting, joint swelling, muscle weakness   Psychiatric:             Appropriate affect, cooperative   Neurologic:            Awake alert ,oriented x 3, strength symmetric in all extremities, Cranial Nerves grossly intact to confrontation, speech clear   Skin:                      No rashes, bruising, skin ulcers, petechiae or ecchymosis    Result Review    Result Review:  I have personally reviewed the results from the time of this admission to 1/30/2023 08:59 EST and agree with these findings:  []  Laboratory  []  Microbiology  []  Radiology  []  EKG/Telemetry   []  Cardiology/Vascular   []  Pathology  []  Old records  []  Other:    Assessment & Plan   Assessment / Plan     Active Hospital Problems:  Active Hospital Problems    Diagnosis    • **HAIM (acute kidney injury) (HCC)    • Essential hypertension    • Chronic pain syndrome    • Obesity    • Hyponatremia        Plan:   Patient does not seem to be volume depleted so I will avoid any IV fluids  Patient's blood pressure is improving I expect her renal function also continue to improve  If blood pressure persistently stays high we can resume antihypertensive medications      DVT prophylaxis:  Medical DVT prophylaxis orders are present.    CODE STATUS:    Code Status (Patient has no pulse and is not breathing): CPR (Attempt to Resuscitate)  Medical Interventions (Patient has pulse or is breathing): Full Support    Admission Status:  I believe this patient meets inpt status.    Electronically signed by Lillie LOVE  MD Ivan, 23, 8:52 AM EST.             Electronically signed by Lillie Love MD at 23 0900     Eliazar Ortega MD at 23 1928            [facility]   HISTORY AND PHYSICAL    Patient Name: Davidson Lyle  : 1970  MRN: [id number]  Primary Care Physician: Madi Monson MD  Date of admission: 2023    Subjective   Subjective     Chief Complaint: Found down    History of Present Illness  52-year-old obese female smoker with depression, HLD, HTN, PTSD, von Willebrand's disease, and chronic pain issues presents having been found down.  The patient reports she put a lidocaine patch (which is new to her) on last night and it made her feel weak and strange all over; as such, she took 2 Benadryl.  She woke in the morning to her usual state and went about her business in the morning.  And then the next thing she remembers is waking up in the hospital.    Patient denies any recent illness.  Patient denies any new headache, dizziness, confusion.  Denies any dysuria, frequency, urgency.  Denies fevers, chills, nausea, vomiting, chest pain, shortness of breath, abdominal pain, change in bowel habits.    Per the ED:  Pt was brought in by EMS. Family is at bedside. Pt admits to a headache at present, denies any recent trauma or injury. Pt reports she had a new medication prescribed on Friday (2 days ago), does not recall which one exactly but states it is an 'orange pill'.   According to EMS, they found PT unconscious. They believe Pt may have taken too many of her prescribed medications.     Currently, the patient seems to be completely lucid and entirely at baseline.    The patient reports taking hydrocodone currently.  Her U tox shows positive for opiate and methadone.  She has no awareness/knowledge of any methadone.  As such repeated U tox to make sure of the results and they were consistent.    In the ED:  VSS    Notable labs:  CK 15,000 -> 11,500 (delayed in coming  back)    Sodium 130  BUN/creatinine 33/3.43 (notably up from 20/1.293 months ago)  ALT/AST 69/172    Urinalysis:  -Nitrate Negative  -Leukocytes negative  -WBC none seen  Bacteria 2+    EKG:  Apparent Wenke Bach    CXR: No acute process    CT head:  CT scan of the head without IV contrast demonstrating new 8 mm area of diminished density in the basal ganglia region on the left, which may represent a subacute or old infarct.    ED consulted cardiology for Wenke block  ED felt patient had UTI and started ceftriaxone.    Review of Systems   Constitutional: Negative.    HENT: Negative.    Eyes: Negative.    Respiratory: Negative.    Cardiovascular:        History of loss of consciousness in the past   Gastrointestinal: Negative.    Endocrine: Negative.    Genitourinary: Negative.    Musculoskeletal: Negative.    Skin: Negative.    Neurological: Negative.    Hematological: Negative.    Psychiatric/Behavioral: Negative.    Personal History     History reviewed. No pertinent past medical history.    History reviewed. No pertinent surgical history.    Family History: family history is not on file. Otherwise pertinent FHx was reviewed and not pertinent to current issue.    Social History:      Home Medications:  amitriptyline, atorvastatin, ergocalciferol, famotidine, gabapentin, levothyroxine, lisinopril, losartan, metoprolol succinate XL, pantoprazole, and tiZANidine      Allergies:  Allergies   Allergen Reactions   • Duloxetine Hcl Hallucinations   • Baclofen Unknown - Low Severity   • Metaxalone Unknown - Low Severity   • Sulfa Antibiotics Unknown - Low Severity   • Latex Rash       Objective    Objective     Vitals:  Temp:  [99 °F (37.2 °C)] 99 °F (37.2 °C)  Heart Rate:  [53-59] 59  Resp:  [16] 16  BP: ()/(47-90) 90/47    Physical Exam  Constitutional:       Appearance: She is obese.  No distress.  HENT:      Head: Normocephalic and atraumatic.      Nose: Nose normal.   Eyes:      Extraocular Movements:  Extraocular movements intact.   Cardiovascular:      Rate and Rhythm: Borderline bradycardia present.   Pulmonary:      Effort: Pulmonary effort is normal.   Abdominal:      Palpations: Abdomen is soft.   Skin:     General: Skin is dry.   Neurological:      General: No focal deficit present.      Mental Status: She is alert and fully oriented.  Psychiatric:         Mood and Affect: Mood normal.      Result Review    Result Review:  I have personally reviewed the results from the time of this admission to 1/29/2023 19:28 EST and agree with these findings:  [x]  Laboratory list / accordion  [x]  Microbiology  [x]  Radiology  [x]  EKG/Telemetry   []  Cardiology/Vascular   []  Pathology  []  Old records  []  Other:  Most notable findings include: HAIM on CKD with elevated CK; Positive Opiates; old infarct on CT head; Wenckebach on EKG.  Question of UTI.        Assessment & Plan   Assessment / Plan     Brief Patient Summary:  52-year-old obese female smoker with depression, HLD, HTN, PTSD, von Willebrand's disease, and chronic pain issues presents having been found down.  Found to have HAIM/rhabdomyolysis on labs and Wenckebach on EKG.    Active Hospital Problems:  Active Hospital Problems    Diagnosis    • **HAIM (acute kidney injury) (HCC)        Plan:   Rhabdomyolysis  HAIM  Hyponatremia (mild)  -Was found down  -Aggressive IVF  [] Follow-up repeat labs  [] Consider nephrology consult    Found down  -Seems possibly due to mismanagement of medication  -Tox screen positive for methadone which the patient has no knowledge of  -Patient back at baseline  -CT brain shows subacute/old infarct  [] Clarify home meds  [] Consider further imaging  [] Consider neurology consult    Kali Villasenor  [] Follow-up with cardiology    Question of UTI  -Ceftriaxone x1 ordered in the ED  [] Assess for the need for ongoing antibiotics    Chronic issues:  [] Reinstate home meds as appropriate    DVT prophylaxis:  Heparin SQ    CODE STATUS:    Code  Status and Medical Interventions:   Ordered at: 01/29/23 1918     Code Status (Patient has no pulse and is not breathing):    CPR (Attempt to Resuscitate)     Medical Interventions (Patient has pulse or is breathing):    Full Support       Admission Status:  I believe this patient meets OBS status.    Eliazar Ortega MD,    Electronically signed by Eliazar Ortega MD at 01/30/23 0413          Emergency Department Notes      Alda Burr MD at 01/29/23 1511          Time: 3:11 PM EST  Date of encounter:  1/29/2023  Independent Historian/Clinical History and Information was obtained by:   Patient  Chief Complaint: Altered Mental Status    History is limited by: N/A    History of Present Illness:  Patient is a 52 y.o. year old female who presents to the emergency department for evaluation of altered mental status.   Pt was brought in by EMS. Family is at bedside. Pt admits to a headache at present, denies any recent trauma or injury. Pt reports she had a new medication prescribed on Friday (2 days ago), does not recall which one exactly but states it is an 'orange pill'.   According to EMS, they found PT unconscious. They believe Pt may have taken too many of her prescribed medications.     Altered Mental Status  Presenting symptoms: confusion and unresponsiveness    Severity:  Mild  Most recent episode:  Today  Episode history:  Single  Progression:  Partially resolved  Chronicity:  New  Context: recent change in medication    Associated symptoms: headaches    Associated symptoms: no abdominal pain, no fever, no nausea, no palpitations, no seizures and no vomiting        Patient Care Team  Primary Care Provider: Madi Monson MD    Past Medical History:     Allergies   Allergen Reactions   • Duloxetine Hcl Hallucinations   • Baclofen Unknown - Low Severity   • Metaxalone Unknown - Low Severity   • Sulfa Antibiotics Unknown - Low Severity   • Latex Rash     Past Medical History:   Diagnosis Date    • Depression    • Disease of thyroid gland    • Elevated cholesterol    • GERD (gastroesophageal reflux disease)    • Hypertension    • PONV (postoperative nausea and vomiting)    • PTSD (post-traumatic stress disorder)    • Von Willebrand disease      Past Surgical History:   Procedure Laterality Date   • BACK SURGERY     • COSMETIC SURGERY       Family History   Problem Relation Age of Onset   • Hypertension Father    • Hyperlipidemia Father    • Heart disease Father    • Early death Father    • Arthritis Paternal Grandmother        Home Medications:  Prior to Admission medications    Medication Sig Start Date End Date Taking? Authorizing Provider   amitriptyline (ELAVIL) 10 MG tablet amitriptyline 10 mg tablet   TAKE 1 TABLET BY MOUTH AT BEDTIME AS NEEDED    Yvonne Nino MD   atorvastatin (LIPITOR) 10 MG tablet atorvastatin 10 mg tablet   TAKE 1 TABLET BY MOUTH EVERY DAY AT BEDTIME    Yvonne Nino MD   ergocalciferol (ERGOCALCIFEROL) 1.25 MG (76158 UT) capsule ergocalciferol (vitamin D2) 1,250 mcg (50,000 unit) capsule   TAKE 1 CAPSULE BY MOUTH EVERY WEEK    Yvonne Nino MD   famotidine (PEPCID) 20 MG tablet famotidine 20 mg tablet   TAKE 1 TABLET BY MOUTH TWICE DAILY    Yvonne Nino MD   gabapentin (NEURONTIN) 800 MG tablet gabapentin 800 mg tablet   Take 4 tablets 4 times a day by oral route as directed for 90 days.    Yvonne Nino MD   levothyroxine (SYNTHROID, LEVOTHROID) 25 MCG tablet Take 1 tablet by mouth Daily. 1/25/23   Yvonne Nino MD   lisinopril (PRINIVIL,ZESTRIL) 10 MG tablet Take 1 tablet by mouth Daily. 8/27/21   Dontrell Martinez DO   losartan (COZAAR) 25 MG tablet losartan 25 mg tablet   TAKE 1 TABLET BY MOUTH EVERY DAY    Yvonne Nino MD   metoprolol succinate XL (TOPROL-XL) 25 MG 24 hr tablet metoprolol succinate ER 25 mg tablet,extended release 24 hr   TAKE 1 TABLET BY MOUTH EVERY DAY    Yvonne Nino MD   pantoprazole  "(PROTONIX) 40 MG EC tablet pantoprazole 40 mg tablet,delayed release   TAKE 1 TABLET BY MOUTH EVERY DAY    Provider, MD Yvonne   tiZANidine (ZANAFLEX) 2 MG tablet Every 12 (Twelve) Hours.    Provider, MD Yvonne        Social History:   Social History     Tobacco Use   • Smoking status: Some Days     Packs/day: 0.25     Years: 34.00     Pack years: 8.50     Types: Cigarettes   Vaping Use   • Vaping Use: Never used   Substance Use Topics   • Alcohol use: Never   • Drug use: Never         Review of Systems:  Review of Systems   Constitutional: Negative for chills and fever.   HENT: Negative for congestion, rhinorrhea and sore throat.    Eyes: Negative for pain and visual disturbance.   Respiratory: Negative for apnea, cough, chest tightness and shortness of breath.    Cardiovascular: Negative for chest pain and palpitations.   Gastrointestinal: Negative for abdominal pain, diarrhea, nausea and vomiting.   Genitourinary: Negative for difficulty urinating and dysuria.   Musculoskeletal: Negative for joint swelling and myalgias.   Skin: Negative for color change.   Neurological: Positive for headaches. Negative for seizures.   Psychiatric/Behavioral: Positive for confusion.   All other systems reviewed and are negative.       Physical Exam:  /62   Pulse 88   Temp 99 °F (37.2 °C) (Rectal)   Resp 16   Ht 167.6 cm (66\")   Wt 89.3 kg (196 lb 13.9 oz)   SpO2 93%   BMI 31.78 kg/m²     Physical Exam  Vitals and nursing note reviewed.   Constitutional:       General: She is not in acute distress.     Appearance: Normal appearance. She is not toxic-appearing.   HENT:      Head: Normocephalic and atraumatic.      Jaw: There is normal jaw occlusion.   Eyes:      General: Lids are normal.      Extraocular Movements: Extraocular movements intact.      Conjunctiva/sclera: Conjunctivae normal.      Pupils: Pupils are equal, round, and reactive to light.   Cardiovascular:      Rate and Rhythm: Normal rate and " regular rhythm.      Pulses: Normal pulses.      Heart sounds: Normal heart sounds.   Pulmonary:      Effort: Pulmonary effort is normal. No respiratory distress.      Breath sounds: Normal breath sounds. No wheezing or rhonchi.   Abdominal:      General: Abdomen is flat.      Palpations: Abdomen is soft.      Tenderness: There is no abdominal tenderness. There is no guarding or rebound.   Musculoskeletal:         General: Normal range of motion.      Cervical back: Normal range of motion and neck supple.      Right lower leg: No edema.      Left lower leg: No edema.   Skin:     General: Skin is warm and dry.   Neurological:      General: No focal deficit present.      Mental Status: She is alert. Mental status is at baseline. She is confused.      Cranial Nerves: Cranial nerves 2-12 are intact.      Sensory: Sensation is intact.      Motor: Motor function is intact.      Coordination: Coordination is intact.      Comments: Patient appears slightly confused but follow all commands. Rest of neurological exam is normal.   Psychiatric:         Mood and Affect: Mood normal.                 Procedures:  Procedures      Medical Decision Making:      Comorbidities that affect care:    None    External Notes reviewed:    Previous ED Note      The following orders were placed and all results were independently analyzed by me:  Orders Placed This Encounter   Procedures   • Blood Culture - Blood,   • Blood Culture - Blood,   • XR Chest 1 View   • CT Head Without Contrast   • [place] Draw   • Comprehensive Metabolic Panel   • Troponin   • Urinalysis With Culture If Indicated -   • CBC Auto Differential   • Lactic Acid, Plasma   • Acetaminophen Level   • Urine Drug Screen - Urine, Clean Catch   • CK   • Ethanol   • Lithium Level   • Magnesium   • Salicylate Level   • T4, Free   • TSH   • Urinalysis, Microscopic Only - Urine, Clean Catch   • Potassium   • Magnesium   • Troponin   • Blood Gas, Arterial -With Co-Ox Panel: Yes   •  Magnesium   • Phosphorus   • Comprehensive Metabolic Panel   • Magnesium   • Lipid Panel   • Diet: Regular/House Diet; Texture: Regular Texture (IDDSI 7); Fluid Consistency: Thin (IDDSI 0)   • Undress & Gown   • Continuous Pulse Oximetry   • Vital Signs   • Orthostatic Blood Pressure   • Vital Signs   • Intake & Output   • Weigh Patient   • Oral Care   • Telemetry - Maintain IV Access   • Continuous Cardiac Monitoring   • Telemetry - Pulse Oximetry   • Code Status and Medical Interventions:   • Inpatient Hospitalist Consult   • Inpatient Case Management  Consult   • Inpatient Cardiology Consult   • Inpatient Cardiology Consult   • OT Consult: Eval & Treat   • PT Consult: Eval & Treat Discharge Placement Assessment   • Oxygen Therapy- Nasal Cannula; Titrate for SPO2: 90% - 95%   • Oxygen Therapy- Nasal Cannula; Titrate for SPO2: 90% - 95%   • POC Glucose Once   • POC Glucose Once   • ECG 12 Lead ED Triage Standing Order; Weak / Dizzy / AMS   • ECG 12 Lead Rhythm Change   • ECG 12 Lead Chest Pain   • STAT Adult Transthoracic Echo Complete W/ Cont if Necessary Per Protocol   • Insert Peripheral IV   • Insert Peripheral IV   • Initiate Observation Status   • Fall Precautions   • CBC & Differential   • Green Top (Gel)   • Lavender Top   • Gold Top - SST   • Light Blue Top       Medications Given in the Emergency Department:  Medications   sodium chloride 0.9 % flush 10 mL (has no administration in time range)   sodium chloride 0.9 % flush 10 mL (has no administration in time range)   sodium chloride 0.9 % flush 10 mL (10 mL Intravenous Given 1/29/23 2031)   sodium chloride 0.9 % infusion 40 mL (has no administration in time range)   heparin (porcine) 5000 UNIT/ML injection 5,000 Units (5,000 Units Subcutaneous Given 1/29/23 2144)   nitroglycerin (NITROSTAT) SL tablet 0.4 mg (has no administration in time range)   acetaminophen (TYLENOL) tablet 650 mg (has no administration in time range)   sodium chloride  0.9 % bolus 1,000 mL (0 mL Intravenous Stopped 1/29/23 1727)   sodium chloride 0.9 % infusion  - ADS Override Pull (0 mL/hr  Stopped 1/29/23 1854)   sodium chloride 0.9 % infusion  - ADS Override Pull (0 mL/hr  Stopped 1/29/23 1854)   cefTRIAXone (ROCEPHIN) IVPB 1 g (0 g Intravenous Stopped 1/29/23 2031)        ED Course:    ED Course as of 01/29/23 2156   Sun Jan 29, 2023 2153 EKG:    Rhythm: sinus  Rate: 54  Axis: normal  Intervals: Mobitz II  ST Segment: no elevations      Interpreted by me   [BN]      ED Course User Index  [BN] Alda Burr MD       Labs:    Lab Results (last 24 hours)     Procedure Component Value Units Date/Time    POC Glucose Once [[id number]]  (Normal) Collected: 01/29/23 1506    Specimen: Blood Updated: 01/29/23 1507     Glucose 98 mg/dL      Comment: Serial Number: [id number]:  [id number]       CBC & Differential [[id number]]  (Normal) Collected: 01/29/23 1522    Specimen: Blood Updated: 01/29/23 1542    Narrative:      The following orders were created for panel order CBC & Differential.  Procedure                               Abnormality         Status                     ---------                               -----------         ------                     CBC Auto Differential[[id number]]        Normal              Final result                 Please view results for these tests on the individual orders.    CBC Auto Differential [[id number]]  (Normal) Collected: 01/29/23 1522    Specimen: Blood Updated: 01/29/23 1542     WBC 9.82 10*3/mm3      RBC 4.13 10*6/mm3      Hemoglobin 12.9 g/dL      Hematocrit 38.7 %      MCV 93.7 fL      MCH 31.2 pg      MCHC 33.3 g/dL      RDW 13.4 %      RDW-SD 45.5 fl      MPV 11.9 fL      Platelets 187 10*3/mm3      Neutrophil % 58.1 %      Lymphocyte % 31.6 %      Monocyte % 8.2 %      Eosinophil % 1.5 %      Basophil % 0.4 %      Immature Grans % 0.2 %      Neutrophils, Absolute 5.70 10*3/mm3      Lymphocytes, Absolute 3.10 10*3/mm3       Monocytes, Absolute 0.81 10*3/mm3      Eosinophils, Absolute 0.15 10*3/mm3      Basophils, Absolute 0.04 10*3/mm3      Immature Grans, Absolute 0.02 10*3/mm3      nRBC 0.0 /100 WBC     Lactic Acid, Plasma [[id number]]  (Normal) Collected: 01/29/23 1524    Specimen: Blood Updated: 01/29/23 1613     Lactate 1.2 mmol/L     Comprehensive Metabolic Panel [[id number]]  (Abnormal) Collected: 01/29/23 1635    Specimen: Blood Updated: 01/29/23 1707     Glucose 113 mg/dL      BUN 33 mg/dL      Creatinine 3.43 mg/dL      Sodium 130 mmol/L      Potassium 4.2 mmol/L      Chloride 93 mmol/L      CO2 24.5 mmol/L      Calcium 8.5 mg/dL      Total Protein 7.1 g/dL      Albumin 4.0 g/dL      ALT (SGPT) 69 U/L      AST (SGOT) 172 U/L      Alkaline Phosphatase 100 U/L      Total Bilirubin 0.3 mg/dL      Globulin 3.1 gm/dL      A/G Ratio 1.3 g/dL      BUN/Creatinine Ratio 9.6     Anion Gap 12.5 mmol/L      eGFR 15.3 mL/min/1.73     Narrative:      GFR Normal >60  Chronic Kidney Disease <60  Kidney Failure <15      Troponin [[id number]]  (Normal) Collected: 01/29/23 1635    Specimen: Blood Updated: 01/29/23 1707     Troponin T <0.010 ng/mL     Narrative:      Troponin T Reference Range:  <= 0.03 ng/mL-   Negative for AMI  >0.03 ng/mL-     Abnormal for myocardial necrosis.  Clinicians would have to utilize clinical acumen, EKG, Troponin and serial changes to determine if it is an Acute Myocardial Infarction or myocardial injury due to an underlying chronic condition.       Results may be falsely decreased if patient taking Biotin.      Urinalysis With Culture If Indicated - Urinary Bladder [[id number]]  (Abnormal) Collected: 01/29/23 1635    Specimen: Urine from Urinary Bladder Updated: 01/29/23 1659     Color, UA Yellow     Appearance, UA Cloudy     pH, UA <=5.0     Specific Gravity, UA 1.014     Glucose, UA Negative     Ketones, UA Negative     Bilirubin, UA Negative     Blood, UA Large (3+)     Protein, UA 30 mg/dL (1+)     Leuk Esterase,  UA Negative     Nitrite, UA Negative     Urobilinogen, UA 0.2 E.U./dL    Narrative:      In absence of clinical symptoms, the presence of pyuria, bacteria, and/or nitrites on the urinalysis result does not correlate with infection.    Urine Drug Screen - Urine, Clean Catch [[id number]]  (Abnormal) Collected: 01/29/23 1635    Specimen: Urine, Clean Catch Updated: 01/29/23 1714     Amphet/Methamphet, Screen Negative     Barbiturates Screen, Urine Negative     Benzodiazepine Screen, Urine Negative     Cocaine Screen, Urine Negative     Opiate Screen Positive     THC, Screen, Urine Negative     Methadone Screen, Urine Positive     Oxycodone Screen, Urine Negative    Narrative:      Negative Thresholds Per Drugs Screened:    Amphetamines                 500 ng/ml  Barbiturates                 200 ng/ml  Benzodiazepines              100 ng/ml  Cocaine                      300 ng/ml  Methadone                    300 ng/ml  Opiates                      300 ng/ml  Oxycodone                    100 ng/ml  THC                           50 ng/ml    The Normal Value for all drugs tested is negative. This report includes final unconfirmed screening results to be used for medical treatment purposes only. Unconfirmed results must not be used for non-medical purposes such as employment or legal testing. Clinical consideration should be applied to any drug of abuse test, particularly when unconfirmed results are used.            Urinalysis, Microscopic Only - Urinary Bladder [[id number]]  (Abnormal) Collected: 01/29/23 1635    Specimen: Urine from Urinary Bladder Updated: 01/29/23 1659     RBC, UA 3-5 /HPF      WBC, UA None Seen /HPF      Comment: Urine culture not indicated.        Bacteria, UA 2+ /HPF      Squamous Epithelial Cells, UA 0-2 /HPF      Hyaline Casts, UA 3-6 /LPF      Methodology Manual Light Microscopy    Magnesium [[id number]]  (Normal) Collected: 01/29/23 1635    Specimen: Blood Updated: 01/29/23 1940     Magnesium 2.5  mg/dL     Phosphorus [[id number]]  (Abnormal) Collected: 01/29/23 1635    Specimen: Blood Updated: 01/29/23 1940     Phosphorus 5.9 mg/dL     Blood Culture - Blood, Arm, Left [[id number]] Collected: 01/29/23 1856    Specimen: Blood from Arm, Left Updated: 01/29/23 1908    Blood Culture - Blood, Arm, Left [[id number]] Collected: 01/29/23 1856    Specimen: Blood from Arm, Left Updated: 01/29/23 1908           Imaging:    CT Head Without Contrast    Result Date: 1/29/2023  PROCEDURE: CT HEAD WO CONTRAST  COMPARISON:  [facility], CT, HEAD W/O CONTRAST, 2/26/2013, 15:19.  INDICATIONS: ALTERED MENTAL STATUS  PROTOCOL:   Standard imaging protocol performed    RADIATION:   DLP: 1588.3mGy*cm   MA and/or KV was adjusted to minimize radiation dose.    TECHNIQUE: CT images were obtained without non-ionic intravenous contrast material.  FINDINGS:  The ventricles are normal in size, position, and configuration.  Sulci are not abnormally prominent.  7 mm area of diminished density in the basal ganglia region on the right is stable, and may represent old infarct or dilated perivascular space.  New 8 mm area of diminished density in the basal ganglia region on the left is not evident on the prior study, and may represent a subacute or old infarct.  There is no CT evidence of acute intracranial hemorrhage.  The orbits have a normal appearance.  The paranasal sinuses, middle ears, and mastoid air cells are well aerated.  No fractures are seen on bone window images.   CONTINUED ON NEXT PAGE...          CT scan of the head without IV contrast demonstrating new 8 mm area of diminished density in the basal ganglia region on the left, which may represent a subacute or old infarct.     MARGE DICK MD       Electronically Signed and Approved By: MARGE DICK MD on 1/29/2023 at 16:09             XR Chest 1 View    Result Date: 1/29/2023  PROCEDURE: XR CHEST 1 VW  COMPARISON: [facility], CR, XR CHEST 1 VW,  8/27/2021, 16:44.  INDICATIONS: ALTERED MENTAL STATUS  FINDINGS:  The heart is probably normal in size.  Low lung volumes are evident.  No airspace disease is seen.  Bony structures appear intact.        Low lung volumes.  No active disease is seen.       MARGE DICK MD       Electronically Signed and Approved By: AMRGE DICK MD on 1/29/2023 at 16:03                 Differential Diagnosis and Discussion:    Altered Mental Status: Based on the patient's signs and symptoms, differential diagnosis includes but is not limited to meningitis, stroke, sepsis, subarachnoid hemorrhage, intracranial bleeding, encephalitis, and metabolic encephalopathy.    All labs were reviewed and analyzed by me.  All X-rays were independently reviewed by me.  EKG was interpreted by me.  CT scan radiology interpretation was reviewed by me.    MDM  Number of Diagnoses or Management Options  Acute kidney injury (HAIM) with acute tubular necrosis (ATN) (HCC)  Diagnosis management comments: The patient´s CBC that was reviewed and interpreted by me shows no abnormalities of critical concern. Of note, there is no anemia requiring a blood transfusion and the platelet count is acceptable.  CMP shows a BUN of 33 and a creatinine of 3.43.  Urinalysis is positive for methadone and opiates.  Urinalysis shows 2+ bacteriuria.  CT scan of the head does show a subacute or old infarct in the basal ganglia.  Chest x-ray is negative for pneumonia.  Patient was given 2 L normal saline and Rocephin in the emergency department.  Case was discussed with Dr. Balderas concerning the patient's type II block on EKG.  Case was discussed with Dr. Ortega who agrees with admission.  Patient did become much more alert after normal saline bolus.       Amount and/or Complexity of Data Reviewed  Clinical lab tests: reviewed  Tests in the radiology section of CPT®: reviewed  Tests in the medicine section of CPT®: reviewed  Discussion of test results with the performing  providers: yes  Decide to obtain previous medical records or to obtain history from someone other than the patient: yes  Discuss the patient with other providers: yes  Independent visualization of images, tracings, or specimens: yes    Risk of Complications, Morbidity, and/or Mortality  Presenting problems: high  Management options: high    Critical Care  Total time providing critical care: 45 minutes    Patient Progress  Patient progress: improved (18:38 EST Patient states she feels much better, A&Ox4. Upon asking about the new prescribed medication she mentioned earlier, she now reports the only thing different she used today was a lidocaine patch and wonders if her symptoms may be attributed to an 'allergic reaction' to it. Updated patient on results and plan on admission. Patient expressed understanding and agreement. All questions answered at this time. )       Critical Care Note: Total Critical Care time of 45 minutes. Total critical care time documented does not include time spent on separately billed procedures for services of nurses or physician assistants. I personally saw and examined the patient. I have reviewed all diagnostic interpretations and treatment plans as written. I was present for the key portions of any procedures performed and the inclusive time noted in any critical care statement. Critical care time includes patient management by me, time spent at the patients bedside,  time to review lab and imaging results, discussing patient care, documentation in the medical record, and time spent with family or caregiver.    Patient Care Considerations:    none      Consultants/Shared Management Plan:    Hospitalist: I have discussed the case with Dr. Ortega who agrees to accept the patient for admission.  Consultant: I have discussed the case with Dr. Balderas who agrees to consult on the patient.    Social Determinants of Health:    Patient is independent, reliable, and has access to care.        Disposition and Care Coordination:    Admit:   Through independent evaluation of the patient's history, physical, and imperical data, the patient meets criteria for observation/admission to the hospital.        Final diagnoses:   Acute kidney injury (HAIM) with acute tubular necrosis (ATN) ([facility])        ED Disposition     ED Disposition   Decision to Admit    Condition   --    Comment   Level of Care: Telemetry [5]   Diagnosis: HAIM (acute kidney injury) ([facility]) [556099]   Admitting Physician: GABRIEL PAT [[id number]]   Attending Physician: GABRIEL PAT [[id number]]   Bed Request Comments: Odd presetation with concern for stroke               This medical record created using voice recognition software.      Documentation assistance provided by Ronda Nichols acting as scribe for Alda Burr MD. Information recorded by the scribe was done at my direction and has been verified and validated by me.        Ronda Nichols  23 1553       Ronda Nichols  23 1845       Alda Burr MD  23 2156      Electronically signed by Alda Burr MD at 23 2156          Physician Progress Notes (last 24 hours)      Lillie Love MD at 23 0913           [facility]     Progress Note    Patient Name: Davidson Lyle  : 1970  MRN: [id number]  Primary Care Physician:  Madi Monson MD  Date of admission: 2023    Subjective Patient has no complaints issues today and is doing well.  Blood pressure is under decent control.  Acute kidney injury resolved.  Hyponatremia resolved    Review of Systems  Constitutional:        Weakness tiredness fatigue  Eyes:                       No blurry vision, eye discharge, eye irritation, eye pain  HEENT:                   No acute hair loss, earache and discharge, nasal congestion or discharge, sore throat, postnasal drip  Respiratory:           No shortness of breath coughing sputum production  wheezing hemoptysis pleuritic chest pain  Cardiovascular:     No chest pain, orthopnea, PND, dizziness, palpitation, lower extremity edema  Gastrointestinal:   No nausea vomiting diarrhea abdominal pain constipation  Genitourinary:       No urinary incontinence, hesitancy, frequency, urgency, dysuria  Hematologic:         No bruising, bleeding, pallor, lymphadenopathy  Endocrine:            No coldness, hot flashes, polyuria, abnormal hair growth  Musculoskeletal:    No body pains, aches, arthritic pains, muscle pain ,muscle wasting  Psychiatric:          No low or high mood, anxiety, hallucinations, delusions  Skin.                      No rash, ulcers, bruising, itching  Neurological:        No confusion, headache, focal weakness, numbness, dysphasia    Objective   Objective     Vitals:   Temp:  [97.7 °F (36.5 °C)-98.6 °F (37 °C)] 97.7 °F (36.5 °C)  Heart Rate:  [65-80] 65  Resp:  [16-18] 16  BP: (151-175)/(66-86) 151/73  Physical Exam    Constitutional: Awake, alert responsive, conversant, no obvious distress              Psychiatric:  Appropriate affect, cooperative   Neurologic:  Awake alert ,oriented x 3, strength symmetric in all extremities, Cranial Nerves grossly intact to confrontation, speech clear   Eyes:   PERRLA, sclerae anicteric, no conjunctival injection   HEENT:  Moist mucous membranes, no nasal or eye discharge, no throat congestion   Neck:   Supple, no thyromegaly, no lymphadenopathy, trachea midline, no elevated JVD   Respiratory:  Clear to auscultation bilaterally, nonlabored respirations    Cardiovascular: RRR, no murmurs, rubs, or gallops, palpable pedal pulses bilaterally, No bilateral ankle edema   Gastrointestinal: Positive bowel sounds, soft, nontender, nondistended, no organomegaly   Musculoskeletal:  No clubbing or cyanosis to extremities,muscle wasting, joint swelling, muscle weakness             Skin:                      No rashes, bruising, skin ulcers, petechiae or  ecchymosis    Result Review    Result Review:  I have personally reviewed the results from the time of this admission to 2023 09:13 EST and agree with these findings:  []  Laboratory  []  Microbiology  []  Radiology  []  EKG/Telemetry   []  Cardiology/Vascular   []  Pathology  []  Old records  []  Other:    Assessment & Plan   Assessment / Plan       Active Hospital Problems:    Active Hospital Problems    Diagnosis  POA   • **HAIM (acute kidney injury) (HCC) [N17.9]  Yes     Most likely secondary to hypotension because of lidocaine patch and other pain medications     • Acute kidney injury (HAIM) with acute tubular necrosis (ATN) (HCC) [N17.0]  Yes   • Essential hypertension [I10]  Unknown   • Chronic pain syndrome [G89.4]  Unknown   • Obesity [E66.9]  Unknown   • Hyponatremia [E87.1]  Unknown       Plan:   Increase losartan to 50 mg  Patient is clinically stable       Electronically signed by Lillie Love MD, 23, 9:13 AM EST.             Electronically signed by Lillie Love MD at 23 0914     Kristian Blackburn MD at 23 0840           [facility]     Progress Note    Patient Name: Davidson Lyle  : 1970  MRN: [id number]  Primary Care Physician:  Madi Monson MD  Date of admission: 2023    Subjective   Subjective     Chief Complaint: Patient on anticoagulation antiplatelet agents I will check the PTT as well as results of von Willebrand disease factor are pending patient does not have diagnosis [place] family history of von Willebrand's    HPI: We will check the blood type because blood type O could have low von Willebrand's factor    Review of Systems   All systems were reviewed and negative except for: Reviewed    Objective   Objective     Vitals:   Temp:  [97.7 °F (36.5 °C)-98.6 °F (37 °C)] 97.7 °F (36.5 °C)  Heart Rate:  [65-80] 65  Resp:  [16-18] 16  BP: (151-175)/(66-86) 151/73    Physical Exam    Constitutional: Awake, alert   Eyes: PERRLA, sclerae  anicteric, no conjunctival injection   HENT: NCAT, mucous membranes moist   Neck: Supple, no thyromegaly, no lymphadenopathy, trachea midline   Respiratory: Clear to auscultation bilaterally, nonlabored respirations    Cardiovascular: RRR, no murmurs, rubs, or gallops, palpable pedal pulses bilaterally   Gastrointestinal: Positive bowel sounds, soft, nontender, nondistended   Musculoskeletal: No bilateral ankle edema, no clubbing or cyanosis to extremities   Psychiatric: Appropriate affect, cooperative   Neurologic: Oriented x 3, strength symmetric in all extremities, Cranial Nerves grossly intact to confrontation, speech clear   Skin: No rashes   No change  Result Review    Result Review:  I have personally reviewed the results from the time of this admission to 2/1/2023 08:41 EST and agree with these findings:  [x]  Laboratory possible von Willebrand's disease  []  Microbiology  []  Radiology  []  EKG/Telemetry   []  Cardiology/Vascular   []  Pathology  []  Old records  []  Other:  Most notable findings include: Patient with a stroke and with history of von Willebrand's disease on on antiplatelet    Assessment & Plan   Assessment / Plan     Brief Patient Summary:  Davidson Lyle is a 52 y.o. female who stable doing well not in acute distress    Active Hospital Problems:  Active Hospital Problems    Diagnosis    • **HAIM (acute kidney injury) (HCC)    • Acute kidney injury (HAIM) with acute tubular necrosis (ATN) (HCC)    • Essential hypertension    • Chronic pain syndrome    • Obesity    • Hyponatremia        Plan:   Continue antiplatelet    DVT prophylaxis:  Medical and mechanical DVT prophylaxis orders are present.    CODE STATUS:   Code Status (Patient has no pulse and is not breathing): CPR (Attempt to Resuscitate)  Medical Interventions (Patient has pulse or is breathing): Full Support    Disposition:  I expect patient to be discharged as per primary.    Electronically signed by Kristian Blackburn MD,  23, 8:41 AM EST.      Part of this note may be an electronic transcription/translation of spoken language to printed text using the Dragon Dictation System.       Electronically signed by Kristian Blackburn MD at 23 0842     Lorne Balderas MD at 23 1731             [facility]   Progress Note    Patient Name: Davidson Lyle  : 1970  MRN: [id number]  Primary Care Physician: Madi Monson MD  Date of admission: 2023    Subjective   Subjective     HPI:  Patient reports that feels better today, no chest pain or shortness of breath, her kidney function is normal, patient has atherosclerotic disease in the brain patient had a stroke was started on Plavix and aspirin, patient telemetry showed intermittent Wenckebach type of block, patient has prediabetes, hypertension.    Review of Systems  Review of Systems   Constitutional: Negative.    HENT: Negative.    Eyes: Negative.    Cardiovascular: Negative.    Gastrointestinal: Negative.    Endocrine: Negative.    Genitourinary: Negative.    Musculoskeletal: Negative.    Skin: Negative.    Allergic/Immunologic: Negative.    Neurological: Negative.    Psychiatric/Behavioral: Negative.        Objective   Objective     Vitals:  Temp:  [98.2 °F (36.8 °C)-98.7 °F (37.1 °C)] 98.2 °F (36.8 °C)  Heart Rate:  [75-91] 75  Resp:  [16-20] 18  BP: (149-187)/(60-86) 173/86    Physical Exam:  Physical Exam  Constitutional:       Appearance: She is obese.   HENT:      Head: Normocephalic and atraumatic.      Mouth/Throat:      Mouth: Mucous membranes are moist.   Cardiovascular:      Rate and Rhythm: Normal rate and regular rhythm.   Pulmonary:      Effort: Pulmonary effort is normal.   Abdominal:      General: Bowel sounds are normal.   Musculoskeletal:      Right lower leg: No edema.      Left lower leg: No edema.   Neurological:      Mental Status: She is alert.         Result Review    Result Review:  I have personally reviewed the results from  the time of this admission to 01/31/23 5:31 PM EST and agree with these findings:  [x]  Laboratory  []  Microbiology  []  Radiology  [x]  EKG/Telemetry   []  Cardiology/Vascular   []  Pathology  []  Old records  []  Other:    Most notable findings include: 52-year-old white female admitted because of loss of consciousness, developed acute renal failure probably related to elevated CK, acute tubular necrosis, patient has prediabetes, hypertension, diagnosed to have a CVA, patient has documented atherosclerotic disease of the small vessels in the brain, patient was diagnosed to have Wenckebach type of block on admission now become intermittent.  Hemoglobin A1c is 6    Assessment & Plan   Assessment / Plan     Active Hospital Problems:  Active Hospital Problems    Diagnosis    • **HAIM (acute kidney injury) (HCC)    • Acute kidney injury (HAIM) with acute tubular necrosis (ATN) (HCC)    • Essential hypertension    • Chronic pain syndrome    • Obesity    • Hyponatremia        Plan:   Nuclear study in the morning, discontinue the metoprolol, patient is going be started on Bystolic, her blood pressure is better today    DVT prophylaxis: As per internal medicine    CODE STATUS:    Code Status and Medical Interventions:   Ordered at: 01/29/23 1918     Code Status (Patient has no pulse and is not breathing):    CPR (Attempt to Resuscitate)     Medical Interventions (Patient has pulse or is breathing):    Full Support       Disposition:  I expect patient to be discharged needs further evaluation.    Electronically signed by Lorne Balderas MD, 01/31/23, 5:31 PM EST.             Electronically signed by Lorne Balderas MD at 01/31/23 8148       Consult Notes (last 24 hours)  Notes from 01/31/23 1044 through 02/01/23 1044   No notes of this type exist for this encounter.

## 2023-02-01 NOTE — PROGRESS NOTES
[facility]   Hospitalist Progress Note  Date: 2023  Patient Name: Davidson Lyle  : 1970  MRN: [id number]  Date of admission: 2023  Consultants:   -Cardiology: Dr. Lorne Balderas  -Nephrology: Dr. Lillie Love  -Neurology: Dr. Ji Salazar    Subjective   Subjective     Chief Complaint: Found down    Summary:   Davidson Lyle is a 52 y.o. female with depression, hyperlipidemia, hypertension, PTSD, von Willebrand's disease and chronic pain issues who presented after being found down.  Patient brought into ED for further evaluation.  U tox returned positive for opiate and methadone, patient is not aware of using any methadone.  Labs notable for elevated CK and elevated creatinine.  Urinalysis consistent with UTI.  CT head obtained and there was a mm area of diminished density in basal ganglia region on the left possibly representing subacute or old infarct.  EKG also obtained and was consistent with a Wenckebach type of block, cardiology consulted. Patient's mentation had returned to baseline shortly after arrival.  Hospitalist service contacted for further evaluation management.  Cardiology, Nephrology, Neurology consulted to assist in care.     Interval Followup: No events overnight.  Patient hemoglobin remained stable.  She has not had any evidence of bleeding.  She is scheduled for myocardial perfusion study.       Antibiotics:   -Ceftriaxone      Pain Medication:   -Norco    Objective   Objective     Vitals:   Temp:  [97.7 °F (36.5 °C)-98.6 °F (37 °C)] 97.7 °F (36.5 °C)  Heart Rate:  [65-83] 65  Resp:  [16-18] 16  BP: (151-175)/(66-86) 151/73  Physical Exam:   Constitutional: Alert, awake, laying in bed  HEENT:  PERRLA, EOMI; No Scleral icterus  Neck:  Supple; No Mass, No Lymphadenopathy  Cardiovascular:  No Rubs, No Edema, No JVD  Respiratory: No respiratory distress, unlabored breathing, saturating well on room air  Abdomen:  Normal BS all 4 Quadrants; No Guarding, No  Tenderness  Musculoskeletal:  Pulses Positive all 4 Ext; No Cyanosis, No Edema  Neurological:  Alert+Ox3, Mental status WNL; No Dysarthria  Skin:  No Rash, No Cellulitis, No Erythema    Result Review    Result Review:  I have personally reviewed the results as below and agree with these findings:  []  Laboratory:   CMP    CMP 1/29/23 1/30/23 1/30/23 1/31/23     0518 1146    Glucose 113 (A) 106 (A) 118 (A) 92   BUN 33 (A) 22 (A) 19 10   Creatinine 3.43 (A) 1.85 (A) 1.50 (A) 0.97   eGFR 15.3 (A) 32.1 (A) 41.2 (A) 69.6   Sodium 130 (A) 140 143 141   Potassium 4.2 4.5 4.4 4.1   Chloride 93 (A) 108 (A) 112 (A) 106   Calcium 8.5 (A) 8.3 (A) 8.4 (A) 9.0   Total Protein 7.1 6.8     Albumin 4.0 3.8     Globulin 3.1 3.0     Total Bilirubin 0.3 0.2     Alkaline Phosphatase 100 92     AST (SGOT) 172 (A) 131 (A)     ALT (SGPT) 69 (A) 58 (A)     Albumin/Globulin Ratio 1.3 1.3     BUN/Creatinine Ratio 9.6 11.9 12.7 10.3   Anion Gap 12.5 7.7 9.4 9.9   (A) Abnormal value       Comments are available for some flowsheets but are not being displayed.           CBC    CBC 10/25/22 1/29/23 1/31/23   WBC 7.12 9.82 6.47   RBC 4.49 4.13 3.93   Hemoglobin 14.6 12.9 12.5   Hematocrit 43.6 38.7 36.5   MCV 97.1 (A) 93.7 92.9   MCH 32.5 31.2 31.8   MCHC 33.5 33.3 34.2   RDW 12.8 13.4 13.4   Platelets 235 187 176   (A) Abnormal value            [x]  Microbiology: Blood culture data is negative to date  []  Radiology:   [x]  EKG/Telemetry:    []  Cardiology/Vascular:    []  Pathology:  []  Old records:  []  Other:    Assessment & Plan   Assessment / Plan     Assessment:  Acute to subacute infarct in the right hippocampus with background of chronic lacunar infarcts  Acute renal failure   Syncope  Concern for acute CVA  Hyponatremia  Wenckebach block  Urinary tract infection due to unknown organism  Essential hypertension  Chronic pain  Obesity (BMI >30)    Plan:  -Cardiology, Neurology and Nephrology consulted and following, appreciate assistance  and recommendations in the care of this patient.  -Patient scheduled for myocardial perfusion study 2/1/23  -As needed IV labetalol for hypertension ordered.  Patient's home metoprolol also started  -Continue ceftriaxone for UTI, unfortunately urine culture not performed, will treat empirically for total of 5 days  -MRI brain, MRA head reviewed, acute to subacute strokes noted  -Echocardiogram reviewed, ejection fraction 74%, grade 1 diastolic dysfunction  -Continue with Synthroid daily  -Continue with amlodipine, losartan increased to 50 mg, continue to titrate as needed  -PT/OT/ST following, appreciate assistance  -BMP reviewed and stable, continue to monitor  -CBC reviewed, hemoglobin currently stable  -Continue with aspirin and Plavix along with statin therapy  -Labs reviewed, imaging reviewed, medications reviewed, vital signs reviewed  -Further recommendations pending clinical course     DVT Prophylaxis: Heparin  Diet: Regular  Dispo: PT/OT consulted  Code Status: Full code       DVT prophylaxis:  Medical and mechanical DVT prophylaxis orders are present.    CODE STATUS:   Code Status (Patient has no pulse and is not breathing): CPR (Attempt to Resuscitate)  Medical Interventions (Patient has pulse or is breathing): Full Support      Electronically signed by Dago Manrique DO, 02/01/23, 6:04 AM EST.

## 2023-02-01 NOTE — PROGRESS NOTES
[facility]     Progress Note    Patient Name: Davidson Lyle  : 1970  MRN: [id number]  Primary Care Physician:  Madi Monson MD  Date of admission: 2023    Subjective Patient has no complaints issues today and is doing well.  Blood pressure is under decent control.  Acute kidney injury resolved.  Hyponatremia resolved    Review of Systems  Constitutional:        Weakness tiredness fatigue  Eyes:                       No blurry vision, eye discharge, eye irritation, eye pain  HEENT:                   No acute hair loss, earache and discharge, nasal congestion or discharge, sore throat, postnasal drip  Respiratory:           No shortness of breath coughing sputum production wheezing hemoptysis pleuritic chest pain  Cardiovascular:     No chest pain, orthopnea, PND, dizziness, palpitation, lower extremity edema  Gastrointestinal:   No nausea vomiting diarrhea abdominal pain constipation  Genitourinary:       No urinary incontinence, hesitancy, frequency, urgency, dysuria  Hematologic:         No bruising, bleeding, pallor, lymphadenopathy  Endocrine:            No coldness, hot flashes, polyuria, abnormal hair growth  Musculoskeletal:    No body pains, aches, arthritic pains, muscle pain ,muscle wasting  Psychiatric:          No low or high mood, anxiety, hallucinations, delusions  Skin.                      No rash, ulcers, bruising, itching  Neurological:        No confusion, headache, focal weakness, numbness, dysphasia    Objective   Objective     Vitals:   Temp:  [97.7 °F (36.5 °C)-98.6 °F (37 °C)] 97.7 °F (36.5 °C)  Heart Rate:  [65-80] 65  Resp:  [16-18] 16  BP: (151-175)/(66-86) 151/73  Physical Exam    Constitutional: Awake, alert responsive, conversant, no obvious distress              Psychiatric:  Appropriate affect, cooperative   Neurologic:  Awake alert ,oriented x 3, strength symmetric in all extremities, Cranial Nerves grossly intact to confrontation, speech  clear   Eyes:   PERRLA, sclerae anicteric, no conjunctival injection   HEENT:  Moist mucous membranes, no nasal or eye discharge, no throat congestion   Neck:   Supple, no thyromegaly, no lymphadenopathy, trachea midline, no elevated JVD   Respiratory:  Clear to auscultation bilaterally, nonlabored respirations    Cardiovascular: RRR, no murmurs, rubs, or gallops, palpable pedal pulses bilaterally, No bilateral ankle edema   Gastrointestinal: Positive bowel sounds, soft, nontender, nondistended, no organomegaly   Musculoskeletal:  No clubbing or cyanosis to extremities,muscle wasting, joint swelling, muscle weakness             Skin:                      No rashes, bruising, skin ulcers, petechiae or ecchymosis    Result Review    Result Review:  I have personally reviewed the results from the time of this admission to 2/1/2023 09:13 EST and agree with these findings:  []  Laboratory  []  Microbiology  []  Radiology  []  EKG/Telemetry   []  Cardiology/Vascular   []  Pathology  []  Old records  []  Other:    Assessment & Plan   Assessment / Plan       Active Hospital Problems:    Active Hospital Problems    Diagnosis  POA   • **HAIM (acute kidney injury) (HCC) [N17.9]  Yes     Most likely secondary to hypotension because of lidocaine patch and other pain medications     • Acute kidney injury (HAIM) with acute tubular necrosis (ATN) (HCC) [N17.0]  Yes   • Essential hypertension [I10]  Unknown   • Chronic pain syndrome [G89.4]  Unknown   • Obesity [E66.9]  Unknown   • Hyponatremia [E87.1]  Unknown       Plan:   Increase losartan to 50 mg  Patient is clinically stable       Electronically signed by Lillie Love MD, 02/01/23, 9:13 AM EST.

## 2023-02-01 NOTE — PROGRESS NOTES
[facility]     Progress Note    Patient Name: Davidson Lyle  : 1970  MRN: [id number]  Primary Care Physician:  Madi Monson MD  Date of admission: 2023    Subjective   Subjective     Chief Complaint: Patient on anticoagulation antiplatelet agents I will check the PTT as well as results of von Willebrand disease factor are pending patient does not have diagnosis Mills family history of von Willebrand's    HPI: We will check the blood type because blood type O could have low von Willebrand's factor    Review of Systems   All systems were reviewed and negative except for: Reviewed    Objective   Objective     Vitals:   Temp:  [97.7 °F (36.5 °C)-98.6 °F (37 °C)] 97.7 °F (36.5 °C)  Heart Rate:  [65-80] 65  Resp:  [16-18] 16  BP: (151-175)/(66-86) 151/73    Physical Exam    Constitutional: Awake, alert   Eyes: PERRLA, sclerae anicteric, no conjunctival injection   HENT: NCAT, mucous membranes moist   Neck: Supple, no thyromegaly, no lymphadenopathy, trachea midline   Respiratory: Clear to auscultation bilaterally, nonlabored respirations    Cardiovascular: RRR, no murmurs, rubs, or gallops, palpable pedal pulses bilaterally   Gastrointestinal: Positive bowel sounds, soft, nontender, nondistended   Musculoskeletal: No bilateral ankle edema, no clubbing or cyanosis to extremities   Psychiatric: Appropriate affect, cooperative   Neurologic: Oriented x 3, strength symmetric in all extremities, Cranial Nerves grossly intact to confrontation, speech clear   Skin: No rashes   No change  Result Review    Result Review:  I have personally reviewed the results from the time of this admission to 2023 08:41 EST and agree with these findings:  [x]  Laboratory possible von Willebrand's disease  []  Microbiology  []  Radiology  []  EKG/Telemetry   []  Cardiology/Vascular   []  Pathology  []  Old records  []  Other:  Most notable findings include: Patient with a stroke and with history of von  Willebrand's disease on on antiplatelet    Assessment & Plan   Assessment / Plan     Brief Patient Summary:  Davidson Lyle is a 52 y.o. female who stable doing well not in acute distress    Active Hospital Problems:  Active Hospital Problems    Diagnosis    • **HAIM (acute kidney injury) (HCC)    • Acute kidney injury (HAIM) with acute tubular necrosis (ATN) (HCC)    • Essential hypertension    • Chronic pain syndrome    • Obesity    • Hyponatremia        Plan:   Continue antiplatelet    DVT prophylaxis:  Medical and mechanical DVT prophylaxis orders are present.    CODE STATUS:   Code Status (Patient has no pulse and is not breathing): CPR (Attempt to Resuscitate)  Medical Interventions (Patient has pulse or is breathing): Full Support    Disposition:  I expect patient to be discharged as per primary.    Electronically signed by Kristian Blackburn MD, 02/01/23, 8:41 AM EST.      Part of this note may be an electronic transcription/translation of spoken language to printed text using the Dragon Dictation System.

## 2023-02-01 NOTE — PROGRESS NOTES
[facility]   Progress Note    Patient Name: Davidson Lyle  : 1970  MRN: [id number]  Primary Care Physician: Madi Monson MD  Date of admission: 2023    Subjective   Subjective     HPI:  Patient reports patient reported no chest pain or shortness of breath he had a nuclear study today which showed evidence of an apical infarct.  Patient also has atherosclerotic disease in the brain, I have discussed the coronary artery disease risk factors with her in detail.    Review of Systems  Review of Systems   Constitutional: Negative.    HENT: Negative.    Eyes: Negative.    Respiratory: Negative.    Cardiovascular: Negative.    Gastrointestinal: Negative.    Endocrine: Negative.    Genitourinary: Negative.    Musculoskeletal: Negative.    Skin: Negative.    Allergic/Immunologic: Negative.    Neurological: Negative.    Hematological: Negative.    Psychiatric/Behavioral: Negative.        Objective   Objective     Vitals:  Temp:  [97.3 °F (36.3 °C)-98.6 °F (37 °C)] 97.7 °F (36.5 °C)  Heart Rate:  [60-80] 76  Resp:  [16] 16  BP: (151-175)/(64-82) 151/77    Physical Exam:  Physical Exam  Constitutional:       Appearance: She is obese.   HENT:      Head: Normocephalic and atraumatic.      Nose: Nose normal.   Cardiovascular:      Rate and Rhythm: Normal rate.   Abdominal:      General: Abdomen is flat.   Musculoskeletal:         General: Normal range of motion.   Skin:     General: Skin is warm.   Neurological:      General: No focal deficit present.      Mental Status: She is alert.   Psychiatric:         Mood and Affect: Mood normal.         Result Review    Result Review:  I have personally reviewed the results from the time of this admission to 23 3:55 PM EST and agree with these findings:  [x]  Laboratory  []  Microbiology  []  Radiology  [x]  EKG/Telemetry   []  Cardiology/Vascular   []  Pathology  []  Old records  []  Other:    Most notable findings include: 52-year-old white female with  underlying atherosclerotic disease in the brain, coronary artery disease, apical infarct, needs control her blood pressure, diabetes, hyperlipidemia, weight, teaching about her diet regular low-fat and low simple carbohydrates.    Assessment & Plan   Assessment / Plan     Active Hospital Problems:  Active Hospital Problems    Diagnosis    • **HAIM (acute kidney injury) ([facility])    • Acute kidney injury (HAIM) with acute tubular necrosis (ATN) ([facility])    • Essential hypertension    • Chronic pain syndrome    • Obesity    • Hyponatremia        Plan:   As per orders, if patient is discharged I would like to follow her at the office in 1 or 2 weeks    DVT prophylaxis: As per internal medicine    CODE STATUS:    Code Status and Medical Interventions:   Ordered at: 01/29/23 1918     Code Status (Patient has no pulse and is not breathing):    CPR (Attempt to Resuscitate)     Medical Interventions (Patient has pulse or is breathing):    Full Support       Disposition:  I expect patient to be discharged as per internal medicine.    Electronically signed by Lorne Balderas MD, 02/01/23, 3:55 PM EST.

## 2023-02-01 NOTE — CONSULTS
Consult received per Stroke Protocol. Patient with a current HbA1c of 6%, in the pre-diabetic range. Patient has remained in the pre-diabetic range for some time. Added nutrition and pre-diabetes teaching content to AVS to refer to after discharge.

## 2023-02-02 LAB
BH CV IMMEDIATE POST RECOVERY TECH DATA SYMPTOMS: NORMAL
BH CV IMMEDIATE POST TECH DATA BLOOD PRESSURE: NORMAL MMHG
BH CV IMMEDIATE POST TECH DATA HEART RATE: 94 BPM
BH CV IMMEDIATE POST TECH DATA OXYGEN SATS: 99 %
BH CV REST NUCLEAR ISOTOPE DOSE: 9.2 MCI
BH CV SIX MINUTE RECOVERY TECH DATA BLOOD PRESSURE: NORMAL
BH CV SIX MINUTE RECOVERY TECH DATA HEART RATE: 83 BPM
BH CV SIX MINUTE RECOVERY TECH DATA OXYGEN SATURATION: 100 %
BH CV SIX MINUTE RECOVERY TECH DATA SYMPTOMS: NORMAL
BH CV STRESS BP STAGE 1: NORMAL
BH CV STRESS COMMENTS STAGE 1: NORMAL
BH CV STRESS DOSE REGADENOSON STAGE 1: 0.4
BH CV STRESS DURATION MIN STAGE 1: 0
BH CV STRESS DURATION SEC STAGE 1: 10
BH CV STRESS HR STAGE 1: 69
BH CV STRESS NUCLEAR ISOTOPE DOSE: 37 MCI
BH CV STRESS O2 STAGE 1: 99
BH CV STRESS PROTOCOL 1: NORMAL
BH CV STRESS RECOVERY BP: NORMAL MMHG
BH CV STRESS RECOVERY HR: 83 BPM
BH CV STRESS RECOVERY O2: 100 %
BH CV STRESS STAGE 1: 1
BH CV THREE MINUTE POST TECH DATA BLOOD PRESSURE: NORMAL MMHG
BH CV THREE MINUTE POST TECH DATA HEART RATE: 88 BPM
BH CV THREE MINUTE POST TECH DATA OXYGEN SATURATION: 100 %
BH CV THREE MINUTE RECOVERY TECH DATA SYMPTOM: NORMAL
LV EF NUC BP: 57 %
MAXIMAL PREDICTED HEART RATE: 166 BPM
PERCENT MAX PREDICTED HR: 56.63 %
QT INTERVAL: 465 MS
STRESS BASELINE BP: NORMAL MMHG
STRESS BASELINE HR: 70 BPM
STRESS O2 SAT REST: 98 %
STRESS PERCENT HR: 67 %
STRESS POST O2 SAT PEAK: 98 %
STRESS POST PEAK BP: NORMAL MMHG
STRESS POST PEAK HR: 94 BPM
STRESS TARGET HR: 141 BPM

## 2023-02-02 NOTE — OUTREACH NOTE
Prep Survey    Flowsheet Row Responses   [facility] facility patient discharged from? [facility]   Is LACE score < 7 ? Yes   Eligibility Not Eligible   What are the reasons patient is not eligible? Other  [Low readmission risk]   Does the patient have one of the following disease processes/diagnoses(primary or secondary)? Other   Prep survey completed? Yes          QUINCY SHOOK - Registered Nurse

## 2023-02-03 LAB
BACTERIA SPEC AEROBE CULT: NORMAL
BACTERIA SPEC AEROBE CULT: NORMAL

## 2023-02-16 ENCOUNTER — TRANSCRIBE ORDERS (OUTPATIENT)
Dept: LAB | Facility: HOSPITAL | Age: 53
End: 2023-02-16
Payer: MEDICARE

## 2023-02-16 DIAGNOSIS — E03.9 HYPOTHYROIDISM, ADULT: Primary | ICD-10-CM

## 2023-03-14 ENCOUNTER — TRANSCRIBE ORDERS (OUTPATIENT)
Dept: LAB | Facility: HOSPITAL | Age: 53
End: 2023-03-14
Payer: MEDICARE

## 2023-03-14 ENCOUNTER — LAB (OUTPATIENT)
Dept: LAB | Facility: HOSPITAL | Age: 53
End: 2023-03-14
Payer: MEDICARE

## 2023-03-14 DIAGNOSIS — E03.9 HYPOTHYROIDISM, ADULT: ICD-10-CM

## 2023-03-14 DIAGNOSIS — E03.9 HYPOTHYROIDISM, UNSPECIFIED TYPE: Primary | ICD-10-CM

## 2023-03-14 LAB
BACTERIA UR QL AUTO: ABNORMAL /HPF
BILIRUB UR QL STRIP: NEGATIVE
CLARITY UR: ABNORMAL
COLOR UR: YELLOW
GLUCOSE UR STRIP-MCNC: NEGATIVE MG/DL
HGB UR QL STRIP.AUTO: ABNORMAL
HYALINE CASTS UR QL AUTO: ABNORMAL /LPF
KETONES UR QL STRIP: ABNORMAL
LEUKOCYTE ESTERASE UR QL STRIP.AUTO: ABNORMAL
NITRITE UR QL STRIP: NEGATIVE
PH UR STRIP.AUTO: 5.5 [PH] (ref 5–8)
PROT UR QL STRIP: ABNORMAL
RBC # UR STRIP: ABNORMAL /HPF
REF LAB TEST METHOD: ABNORMAL
SP GR UR STRIP: 1.02 (ref 1–1.03)
SQUAMOUS #/AREA URNS HPF: ABNORMAL /HPF
UROBILINOGEN UR QL STRIP: ABNORMAL
WBC # UR STRIP: ABNORMAL /HPF

## 2023-03-14 PROCEDURE — 81001 URINALYSIS AUTO W/SCOPE: CPT

## 2023-04-27 ENCOUNTER — TRANSCRIBE ORDERS (OUTPATIENT)
Dept: LAB | Facility: HOSPITAL | Age: 53
End: 2023-04-27
Payer: MEDICARE

## 2023-04-27 DIAGNOSIS — R73.9 HYPERGLYCEMIA: ICD-10-CM

## 2023-04-27 DIAGNOSIS — E78.5 HYPERLIPIDEMIA, UNSPECIFIED HYPERLIPIDEMIA TYPE: Primary | ICD-10-CM

## 2023-09-06 ENCOUNTER — TRANSCRIBE ORDERS (OUTPATIENT)
Dept: ADMINISTRATIVE | Facility: HOSPITAL | Age: 53
End: 2023-09-06
Payer: MEDICARE

## 2023-09-06 DIAGNOSIS — R53.83 OTHER FATIGUE: ICD-10-CM

## 2023-09-06 DIAGNOSIS — E55.9 VITAMIN D DEFICIENCY: ICD-10-CM

## 2023-09-06 DIAGNOSIS — I10 ESSENTIAL (PRIMARY) HYPERTENSION: ICD-10-CM

## 2023-09-06 DIAGNOSIS — Z12.31 VISIT FOR SCREENING MAMMOGRAM: Primary | ICD-10-CM

## 2023-10-02 ENCOUNTER — LAB (OUTPATIENT)
Dept: LAB | Facility: HOSPITAL | Age: 53
End: 2023-10-02
Payer: MEDICARE

## 2023-10-02 ENCOUNTER — HOSPITAL ENCOUNTER (OUTPATIENT)
Dept: MAMMOGRAPHY | Facility: HOSPITAL | Age: 53
Discharge: HOME | End: 2023-10-02
Payer: MEDICARE

## 2023-10-02 DIAGNOSIS — E55.9 VITAMIN D DEFICIENCY: ICD-10-CM

## 2023-10-02 DIAGNOSIS — I10 ESSENTIAL (PRIMARY) HYPERTENSION: ICD-10-CM

## 2023-10-02 DIAGNOSIS — Z12.31 VISIT FOR SCREENING MAMMOGRAM: ICD-10-CM

## 2023-10-02 DIAGNOSIS — R53.83 OTHER FATIGUE: ICD-10-CM

## 2023-10-02 LAB
25(OH)D3 SERPL-MCNC: 18.9 NG/ML (ref 30–100)
ALBUMIN UR-MCNC: <1.2 MG/DL
FOLATE SERPL-MCNC: 4.15 NG/ML (ref 4.78–24.2)
VIT B12 BLD-MCNC: >2000 PG/ML (ref 211–946)

## 2023-10-02 PROCEDURE — 77063 BREAST TOMOSYNTHESIS BI: CPT

## 2023-10-02 PROCEDURE — 82043 UR ALBUMIN QUANTITATIVE: CPT

## 2023-10-02 PROCEDURE — 82746 ASSAY OF FOLIC ACID SERUM: CPT

## 2023-10-02 PROCEDURE — 82607 VITAMIN B-12: CPT

## 2023-10-02 PROCEDURE — 82306 VITAMIN D 25 HYDROXY: CPT

## 2023-10-02 PROCEDURE — 77067 SCR MAMMO BI INCL CAD: CPT

## 2023-10-02 PROCEDURE — 36415 COLL VENOUS BLD VENIPUNCTURE: CPT

## 2024-01-10 ENCOUNTER — TRANSCRIBE ORDERS (OUTPATIENT)
Dept: ADMINISTRATIVE | Facility: HOSPITAL | Age: 54
End: 2024-01-10
Payer: MEDICARE

## 2024-01-10 DIAGNOSIS — M85.80 DECREASED BONE DENSITY: Primary | ICD-10-CM

## 2024-04-01 ENCOUNTER — LAB (OUTPATIENT)
Dept: LAB | Facility: HOSPITAL | Age: 54
End: 2024-04-01
Payer: MEDICARE

## 2024-04-01 ENCOUNTER — HOSPITAL ENCOUNTER (OUTPATIENT)
Dept: BONE DENSITY | Facility: HOSPITAL | Age: 54
Discharge: HOME | End: 2024-04-01
Payer: MEDICARE

## 2024-04-01 DIAGNOSIS — E78.5 HYPERLIPIDEMIA, UNSPECIFIED HYPERLIPIDEMIA TYPE: ICD-10-CM

## 2024-04-01 DIAGNOSIS — R73.9 HYPERGLYCEMIA: ICD-10-CM

## 2024-04-01 DIAGNOSIS — M85.80 DECREASED BONE DENSITY: ICD-10-CM

## 2024-04-01 LAB
BASOPHILS # BLD AUTO: 0.03 10*3/MM3 (ref 0–0.2)
BASOPHILS NFR BLD AUTO: 0.5 % (ref 0–1.5)
CHOLEST SERPL-MCNC: 175 MG/DL (ref 0–200)
DEPRECATED RDW RBC AUTO: 46.9 FL (ref 37–54)
EOSINOPHIL # BLD AUTO: 0.11 10*3/MM3 (ref 0–0.4)
EOSINOPHIL NFR BLD AUTO: 1.9 % (ref 0.3–6.2)
ERYTHROCYTE [DISTWIDTH] IN BLOOD BY AUTOMATED COUNT: 13.8 % (ref 12.3–15.4)
HBA1C MFR BLD: 5.8 % (ref 4.8–5.6)
HCT VFR BLD AUTO: 45 % (ref 34–46.6)
HDLC SERPL-MCNC: 49 MG/DL (ref 40–60)
HGB BLD-MCNC: 14.6 G/DL (ref 12–15.9)
IMM GRANULOCYTES # BLD AUTO: 0.01 10*3/MM3 (ref 0–0.05)
IMM GRANULOCYTES NFR BLD AUTO: 0.2 % (ref 0–0.5)
LDLC SERPL CALC-MCNC: 103 MG/DL (ref 0–100)
LDLC/HDLC SERPL: 2.04 {RATIO}
LYMPHOCYTES # BLD AUTO: 1.29 10*3/MM3 (ref 0.7–3.1)
LYMPHOCYTES NFR BLD AUTO: 22.1 % (ref 19.6–45.3)
MCH RBC QN AUTO: 30.3 PG (ref 26.6–33)
MCHC RBC AUTO-ENTMCNC: 32.4 G/DL (ref 31.5–35.7)
MCV RBC AUTO: 93.4 FL (ref 79–97)
MONOCYTES # BLD AUTO: 0.46 10*3/MM3 (ref 0.1–0.9)
MONOCYTES NFR BLD AUTO: 7.9 % (ref 5–12)
NEUTROPHILS NFR BLD AUTO: 3.95 10*3/MM3 (ref 1.7–7)
NEUTROPHILS NFR BLD AUTO: 67.4 % (ref 42.7–76)
NRBC BLD AUTO-RTO: 0 /100 WBC (ref 0–0.2)
PLATELET # BLD AUTO: 254 10*3/MM3 (ref 140–450)
PMV BLD AUTO: 12.1 FL (ref 6–12)
RBC # BLD AUTO: 4.82 10*6/MM3 (ref 3.77–5.28)
TRIGL SERPL-MCNC: 129 MG/DL (ref 0–150)
VLDLC SERPL-MCNC: 23 MG/DL (ref 5–40)
WBC NRBC COR # BLD AUTO: 5.85 10*3/MM3 (ref 3.4–10.8)

## 2024-04-01 PROCEDURE — 85025 COMPLETE CBC W/AUTO DIFF WBC: CPT

## 2024-04-01 PROCEDURE — 36415 COLL VENOUS BLD VENIPUNCTURE: CPT

## 2024-04-01 PROCEDURE — 83036 HEMOGLOBIN GLYCOSYLATED A1C: CPT

## 2024-04-01 PROCEDURE — 77080 DXA BONE DENSITY AXIAL: CPT

## 2024-04-01 PROCEDURE — 80061 LIPID PANEL: CPT

## 2024-04-10 ENCOUNTER — TRANSCRIBE ORDERS (OUTPATIENT)
Dept: ADMINISTRATIVE | Facility: HOSPITAL | Age: 54
End: 2024-04-10
Payer: MEDICARE

## 2024-04-10 DIAGNOSIS — I10 ESSENTIAL HYPERTENSION, MALIGNANT: Primary | ICD-10-CM

## 2024-04-10 DIAGNOSIS — E03.9 HYPOTHYROIDISM, UNSPECIFIED TYPE: ICD-10-CM

## 2024-04-10 DIAGNOSIS — R53.83 OTHER FATIGUE: ICD-10-CM

## 2024-04-10 DIAGNOSIS — E55.9 VITAMIN D DEFICIENCY: ICD-10-CM

## 2024-07-16 ENCOUNTER — TRANSCRIBE ORDERS (OUTPATIENT)
Dept: LAB | Facility: HOSPITAL | Age: 54
End: 2024-07-16
Payer: MEDICARE

## 2024-07-16 DIAGNOSIS — N39.0 URINARY TRACT INFECTION WITHOUT HEMATURIA, SITE UNSPECIFIED: ICD-10-CM

## 2024-07-16 DIAGNOSIS — R73.9 HYPERGLYCEMIA: Primary | ICD-10-CM

## 2024-12-13 ENCOUNTER — LAB (OUTPATIENT)
Dept: LAB | Facility: HOSPITAL | Age: 54
End: 2024-12-13
Payer: MEDICARE

## 2024-12-13 DIAGNOSIS — R53.83 OTHER FATIGUE: ICD-10-CM

## 2024-12-13 DIAGNOSIS — I10 ESSENTIAL HYPERTENSION, MALIGNANT: ICD-10-CM

## 2024-12-13 DIAGNOSIS — R73.9 HYPERGLYCEMIA: ICD-10-CM

## 2024-12-13 DIAGNOSIS — N39.0 URINARY TRACT INFECTION WITHOUT HEMATURIA, SITE UNSPECIFIED: ICD-10-CM

## 2024-12-13 DIAGNOSIS — E55.9 VITAMIN D DEFICIENCY: ICD-10-CM

## 2024-12-13 DIAGNOSIS — E03.9 HYPOTHYROIDISM, UNSPECIFIED TYPE: ICD-10-CM

## 2024-12-13 LAB
25(OH)D3 SERPL-MCNC: 21.8 NG/ML (ref 30–100)
ALBUMIN SERPL-MCNC: 4.7 G/DL (ref 3.5–5.2)
ALBUMIN UR-MCNC: <1.2 MG/DL
ALBUMIN/GLOB SERPL: 1.1 G/DL
ALP SERPL-CCNC: 132 U/L (ref 39–117)
ALT SERPL W P-5'-P-CCNC: 14 U/L (ref 1–33)
ANION GAP SERPL CALCULATED.3IONS-SCNC: 14 MMOL/L (ref 5–15)
AST SERPL-CCNC: 19 U/L (ref 1–32)
BASOPHILS # BLD AUTO: 0.04 10*3/MM3 (ref 0–0.2)
BASOPHILS NFR BLD AUTO: 0.5 % (ref 0–1.5)
BILIRUB SERPL-MCNC: 0.5 MG/DL (ref 0–1.2)
BILIRUB UR QL STRIP: NEGATIVE
BUN SERPL-MCNC: 8 MG/DL (ref 6–20)
BUN/CREAT SERPL: 5 (ref 7–25)
CALCIUM SPEC-SCNC: 10.3 MG/DL (ref 8.6–10.5)
CHLORIDE SERPL-SCNC: 103 MMOL/L (ref 98–107)
CHOLEST SERPL-MCNC: 240 MG/DL (ref 0–200)
CLARITY UR: CLEAR
CO2 SERPL-SCNC: 26 MMOL/L (ref 22–29)
COLOR UR: YELLOW
CREAT SERPL-MCNC: 1.59 MG/DL (ref 0.57–1)
DEPRECATED RDW RBC AUTO: 47.1 FL (ref 37–54)
EGFRCR SERPLBLD CKD-EPI 2021: 38 ML/MIN/1.73
EOSINOPHIL # BLD AUTO: 0.14 10*3/MM3 (ref 0–0.4)
EOSINOPHIL NFR BLD AUTO: 1.7 % (ref 0.3–6.2)
ERYTHROCYTE [DISTWIDTH] IN BLOOD BY AUTOMATED COUNT: 14 % (ref 12.3–15.4)
FOLATE SERPL-MCNC: 6.06 NG/ML (ref 4.78–24.2)
GLOBULIN UR ELPH-MCNC: 4.1 GM/DL
GLUCOSE SERPL-MCNC: 123 MG/DL (ref 65–99)
GLUCOSE UR STRIP-MCNC: NEGATIVE MG/DL
HBA1C MFR BLD: 5.9 % (ref 4.8–5.6)
HCT VFR BLD AUTO: 49.2 % (ref 34–46.6)
HDLC SERPL-MCNC: 52 MG/DL (ref 40–60)
HGB BLD-MCNC: 16.3 G/DL (ref 12–15.9)
HGB UR QL STRIP.AUTO: NEGATIVE
IMM GRANULOCYTES # BLD AUTO: 0.01 10*3/MM3 (ref 0–0.05)
IMM GRANULOCYTES NFR BLD AUTO: 0.1 % (ref 0–0.5)
KETONES UR QL STRIP: NEGATIVE
LDLC SERPL CALC-MCNC: 155 MG/DL (ref 0–100)
LDLC/HDLC SERPL: 2.91 {RATIO}
LEUKOCYTE ESTERASE UR QL STRIP.AUTO: NEGATIVE
LYMPHOCYTES # BLD AUTO: 3.25 10*3/MM3 (ref 0.7–3.1)
LYMPHOCYTES NFR BLD AUTO: 40 % (ref 19.6–45.3)
MCH RBC QN AUTO: 30.5 PG (ref 26.6–33)
MCHC RBC AUTO-ENTMCNC: 33.1 G/DL (ref 31.5–35.7)
MCV RBC AUTO: 92.1 FL (ref 79–97)
MONOCYTES # BLD AUTO: 0.49 10*3/MM3 (ref 0.1–0.9)
MONOCYTES NFR BLD AUTO: 6 % (ref 5–12)
NEUTROPHILS NFR BLD AUTO: 4.2 10*3/MM3 (ref 1.7–7)
NEUTROPHILS NFR BLD AUTO: 51.7 % (ref 42.7–76)
NITRITE UR QL STRIP: NEGATIVE
NRBC BLD AUTO-RTO: 0 /100 WBC (ref 0–0.2)
PH UR STRIP.AUTO: 6 [PH] (ref 5–8)
PLATELET # BLD AUTO: 263 10*3/MM3 (ref 140–450)
PMV BLD AUTO: 12.5 FL (ref 6–12)
POTASSIUM SERPL-SCNC: 4.4 MMOL/L (ref 3.5–5.2)
PROT SERPL-MCNC: 8.8 G/DL (ref 6–8.5)
PROT UR QL STRIP: NEGATIVE
RBC # BLD AUTO: 5.34 10*6/MM3 (ref 3.77–5.28)
SODIUM SERPL-SCNC: 143 MMOL/L (ref 136–145)
SP GR UR STRIP: 1.01 (ref 1–1.03)
T4 FREE SERPL-MCNC: 1.25 NG/DL (ref 0.92–1.68)
TRIGL SERPL-MCNC: 183 MG/DL (ref 0–150)
TSH SERPL DL<=0.05 MIU/L-ACNC: 3.62 UIU/ML (ref 0.27–4.2)
UROBILINOGEN UR QL STRIP: NORMAL
VIT B12 BLD-MCNC: 1118 PG/ML (ref 211–946)
VLDLC SERPL-MCNC: 33 MG/DL (ref 5–40)
WBC NRBC COR # BLD AUTO: 8.13 10*3/MM3 (ref 3.4–10.8)

## 2024-12-13 PROCEDURE — 80061 LIPID PANEL: CPT

## 2024-12-13 PROCEDURE — 84443 ASSAY THYROID STIM HORMONE: CPT

## 2024-12-13 PROCEDURE — 80053 COMPREHEN METABOLIC PANEL: CPT

## 2024-12-13 PROCEDURE — 87086 URINE CULTURE/COLONY COUNT: CPT

## 2024-12-13 PROCEDURE — 82306 VITAMIN D 25 HYDROXY: CPT

## 2024-12-13 PROCEDURE — 81003 URINALYSIS AUTO W/O SCOPE: CPT

## 2024-12-13 PROCEDURE — 82607 VITAMIN B-12: CPT

## 2024-12-13 PROCEDURE — 36415 COLL VENOUS BLD VENIPUNCTURE: CPT

## 2024-12-13 PROCEDURE — 85025 COMPLETE CBC W/AUTO DIFF WBC: CPT

## 2024-12-13 PROCEDURE — 83036 HEMOGLOBIN GLYCOSYLATED A1C: CPT

## 2024-12-13 PROCEDURE — 84439 ASSAY OF FREE THYROXINE: CPT

## 2024-12-13 PROCEDURE — 82746 ASSAY OF FOLIC ACID SERUM: CPT

## 2024-12-13 PROCEDURE — 82043 UR ALBUMIN QUANTITATIVE: CPT

## 2024-12-14 LAB — BACTERIA SPEC AEROBE CULT: NORMAL

## 2024-12-17 ENCOUNTER — TRANSCRIBE ORDERS (OUTPATIENT)
Dept: LAB | Facility: HOSPITAL | Age: 54
End: 2024-12-17
Payer: MEDICARE

## 2024-12-17 DIAGNOSIS — E03.9 HYPOTHYROIDISM, UNSPECIFIED TYPE: Primary | ICD-10-CM

## 2024-12-17 DIAGNOSIS — N39.0 URINARY TRACT INFECTION WITHOUT HEMATURIA, SITE UNSPECIFIED: ICD-10-CM

## 2024-12-17 DIAGNOSIS — R53.83 OTHER FATIGUE: ICD-10-CM

## 2024-12-17 DIAGNOSIS — E55.9 VITAMIN D DEFICIENCY, UNSPECIFIED: ICD-10-CM

## 2024-12-17 DIAGNOSIS — I10 ESSENTIAL HYPERTENSION, MALIGNANT: ICD-10-CM

## 2024-12-17 DIAGNOSIS — R73.9 HYPERGLYCEMIA: ICD-10-CM

## 2025-05-07 ENCOUNTER — APPOINTMENT (OUTPATIENT)
Dept: GENERAL RADIOLOGY | Facility: HOSPITAL | Age: 55
DRG: 982 | End: 2025-05-07
Payer: MEDICARE

## 2025-05-07 ENCOUNTER — HOSPITAL ENCOUNTER (INPATIENT)
Facility: HOSPITAL | Age: 55
LOS: 5 days | Discharge: HOME | DRG: 982 | End: 2025-05-12
Attending: EMERGENCY MEDICINE | Admitting: STUDENT IN AN ORGANIZED HEALTH CARE EDUCATION/TRAINING PROGRAM
Payer: MEDICARE

## 2025-05-07 ENCOUNTER — APPOINTMENT (OUTPATIENT)
Dept: CARDIOLOGY | Facility: HOSPITAL | Age: 55
DRG: 982 | End: 2025-05-07
Payer: MEDICARE

## 2025-05-07 ENCOUNTER — APPOINTMENT (OUTPATIENT)
Dept: CT IMAGING | Facility: HOSPITAL | Age: 55
DRG: 982 | End: 2025-05-07
Payer: MEDICARE

## 2025-05-07 ENCOUNTER — ANESTHESIA EVENT (OUTPATIENT)
Dept: PERIOP | Facility: HOSPITAL | Age: 55
End: 2025-05-07
Payer: MEDICARE

## 2025-05-07 DIAGNOSIS — Z78.9 DECREASED ACTIVITIES OF DAILY LIVING (ADL): ICD-10-CM

## 2025-05-07 DIAGNOSIS — S82.851A CLOSED TRIMALLEOLAR FRACTURE OF RIGHT ANKLE, INITIAL ENCOUNTER: Primary | ICD-10-CM

## 2025-05-07 DIAGNOSIS — R41.82 ALTERED MENTAL STATUS, UNSPECIFIED ALTERED MENTAL STATUS TYPE: ICD-10-CM

## 2025-05-07 DIAGNOSIS — R26.2 DIFFICULTY WALKING: ICD-10-CM

## 2025-05-07 PROBLEM — I95.9 HYPOTENSION: Status: ACTIVE | Noted: 2025-05-07

## 2025-05-07 LAB
ALBUMIN SERPL-MCNC: 4.5 G/DL (ref 3.5–5.2)
ALBUMIN/GLOB SERPL: 1.2 G/DL
ALP SERPL-CCNC: 113 U/L (ref 39–117)
ALT SERPL W P-5'-P-CCNC: 16 U/L (ref 1–33)
AMPHET+METHAMPHET UR QL: NEGATIVE
AMPHETAMINES UR QL: NEGATIVE
ANION GAP SERPL CALCULATED.3IONS-SCNC: 11.9 MMOL/L (ref 5–15)
ANION GAP SERPL CALCULATED.3IONS-SCNC: 18.2 MMOL/L (ref 5–15)
AORTIC DIMENSIONLESS INDEX: 0.63 (DI)
APAP SERPL-MCNC: <5 MCG/ML (ref 0–30)
AST SERPL-CCNC: 20 U/L (ref 1–32)
AV MEAN PRESS GRAD SYS DOP V1V2: 3 MMHG
AV VMAX SYS DOP: 113 CM/SEC
BACTERIA UR QL AUTO: ABNORMAL /HPF
BARBITURATES UR QL SCN: NEGATIVE
BASOPHILS # BLD AUTO: 0.04 10*3/MM3 (ref 0–0.2)
BASOPHILS # BLD AUTO: 0.05 10*3/MM3 (ref 0–0.2)
BASOPHILS NFR BLD AUTO: 0.5 % (ref 0–1.5)
BASOPHILS NFR BLD AUTO: 0.6 % (ref 0–1.5)
BENZODIAZ UR QL SCN: NEGATIVE
BH CV ECHO MEAS - AO MAX PG: 5.1 MMHG
BH CV ECHO MEAS - AO ROOT DIAM: 2.7 CM
BH CV ECHO MEAS - AO V2 VTI: 25.4 CM
BH CV ECHO MEAS - AVA(I,D): 1.99 CM2
BH CV ECHO MEAS - EDV(CUBED): 85.2 ML
BH CV ECHO MEAS - EDV(MOD-SP2): 83.8 ML
BH CV ECHO MEAS - EDV(MOD-SP4): 55.8 ML
BH CV ECHO MEAS - EF(MOD-SP2): 62.5 %
BH CV ECHO MEAS - EF(MOD-SP4): 63.1 %
BH CV ECHO MEAS - ESV(CUBED): 22 ML
BH CV ECHO MEAS - ESV(MOD-SP2): 31.4 ML
BH CV ECHO MEAS - ESV(MOD-SP4): 20.6 ML
BH CV ECHO MEAS - FS: 36.4 %
BH CV ECHO MEAS - IVS/LVPW: 1.1 CM
BH CV ECHO MEAS - IVSD: 1.1 CM
BH CV ECHO MEAS - LA DIMENSION: 2.6 CM
BH CV ECHO MEAS - LAT PEAK E' VEL: 14.4 CM/SEC
BH CV ECHO MEAS - LV DIASTOLIC VOL/BSA (35-75): 28.3 CM2
BH CV ECHO MEAS - LV MASS(C)D: 158.2 GRAMS
BH CV ECHO MEAS - LV MAX PG: 2.11 MMHG
BH CV ECHO MEAS - LV MEAN PG: 1 MMHG
BH CV ECHO MEAS - LV SYSTOLIC VOL/BSA (12-30): 10.5 CM2
BH CV ECHO MEAS - LV V1 MAX: 72.7 CM/SEC
BH CV ECHO MEAS - LV V1 VTI: 16.1 CM
BH CV ECHO MEAS - LVIDD: 4.4 CM
BH CV ECHO MEAS - LVIDS: 2.8 CM
BH CV ECHO MEAS - LVOT AREA: 3.1 CM2
BH CV ECHO MEAS - LVOT DIAM: 2 CM
BH CV ECHO MEAS - LVPWD: 1 CM
BH CV ECHO MEAS - MED PEAK E' VEL: 11.5 CM/SEC
BH CV ECHO MEAS - MV A MAX VEL: 93.8 CM/SEC
BH CV ECHO MEAS - MV DEC SLOPE: 520.5 CM/SEC2
BH CV ECHO MEAS - MV DEC TIME: 0.24 SEC
BH CV ECHO MEAS - MV E MAX VEL: 73.4 CM/SEC
BH CV ECHO MEAS - MV E/A: 0.78
BH CV ECHO MEAS - MV MAX PG: 5.5 MMHG
BH CV ECHO MEAS - MV MEAN PG: 3 MMHG
BH CV ECHO MEAS - MV P1/2T: 71.5 MSEC
BH CV ECHO MEAS - MV V2 VTI: 28.8 CM
BH CV ECHO MEAS - MVA(P1/2T): 3.1 CM2
BH CV ECHO MEAS - MVA(VTI): 1.76 CM2
BH CV ECHO MEAS - RAP SYSTOLE: 3 MMHG
BH CV ECHO MEAS - RVDD: 2.1 CM
BH CV ECHO MEAS - RVSP: 22.5 MMHG
BH CV ECHO MEAS - SV(LVOT): 50.6 ML
BH CV ECHO MEAS - SV(MOD-SP2): 52.4 ML
BH CV ECHO MEAS - SV(MOD-SP4): 35.2 ML
BH CV ECHO MEAS - SVI(LVOT): 25.7 ML/M2
BH CV ECHO MEAS - SVI(MOD-SP2): 26.6 ML/M2
BH CV ECHO MEAS - SVI(MOD-SP4): 17.9 ML/M2
BH CV ECHO MEAS - TR MAX PG: 19.5 MMHG
BH CV ECHO MEAS - TR MAX VEL: 221 CM/SEC
BH CV ECHO MEASUREMENTS AVERAGE E/E' RATIO: 5.67
BILIRUB SERPL-MCNC: 0.5 MG/DL (ref 0–1.2)
BILIRUB UR QL STRIP: NEGATIVE
BUN SERPL-MCNC: 17 MG/DL (ref 6–20)
BUN SERPL-MCNC: 17 MG/DL (ref 6–20)
BUN/CREAT SERPL: 10.7 (ref 7–25)
BUN/CREAT SERPL: 8.9 (ref 7–25)
BUPRENORPHINE SERPL-MCNC: NEGATIVE NG/ML
CALCIUM SPEC-SCNC: 8.6 MG/DL (ref 8.6–10.5)
CALCIUM SPEC-SCNC: 9.5 MG/DL (ref 8.6–10.5)
CANNABINOIDS SERPL QL: NEGATIVE
CHLORIDE SERPL-SCNC: 102 MMOL/L (ref 98–107)
CHLORIDE SERPL-SCNC: 106 MMOL/L (ref 98–107)
CHOLEST SERPL-MCNC: 216 MG/DL (ref 0–200)
CK SERPL-CCNC: 58 U/L (ref 20–180)
CLARITY UR: CLEAR
CO2 SERPL-SCNC: 18.8 MMOL/L (ref 22–29)
CO2 SERPL-SCNC: 23.1 MMOL/L (ref 22–29)
COCAINE UR QL: NEGATIVE
COLOR UR: YELLOW
CREAT SERPL-MCNC: 1.59 MG/DL (ref 0.57–1)
CREAT SERPL-MCNC: 1.9 MG/DL (ref 0.57–1)
CREAT UR-MCNC: 51.2 MG/DL
D-LACTATE SERPL-SCNC: 1.3 MMOL/L (ref 0.5–2)
D-LACTATE SERPL-SCNC: 2.3 MMOL/L (ref 0.5–2)
DEPRECATED RDW RBC AUTO: 48.3 FL (ref 37–54)
DEPRECATED RDW RBC AUTO: 49 FL (ref 37–54)
EGFRCR SERPLBLD CKD-EPI 2021: 30.7 ML/MIN/1.73
EGFRCR SERPLBLD CKD-EPI 2021: 38 ML/MIN/1.73
EOSINOPHIL # BLD AUTO: 0.03 10*3/MM3 (ref 0–0.4)
EOSINOPHIL # BLD AUTO: 0.09 10*3/MM3 (ref 0–0.4)
EOSINOPHIL NFR BLD AUTO: 0.4 % (ref 0.3–6.2)
EOSINOPHIL NFR BLD AUTO: 1 % (ref 0.3–6.2)
ERYTHROCYTE [DISTWIDTH] IN BLOOD BY AUTOMATED COUNT: 14.4 % (ref 12.3–15.4)
ERYTHROCYTE [DISTWIDTH] IN BLOOD BY AUTOMATED COUNT: 14.5 % (ref 12.3–15.4)
ETHANOL BLD-MCNC: <10 MG/DL (ref 0–10)
ETHANOL UR QL: <0.01 %
FENTANYL UR-MCNC: NEGATIVE NG/ML
GEN 5 1HR TROPONIN T REFLEX: 7 NG/L
GLOBULIN UR ELPH-MCNC: 3.7 GM/DL
GLUCOSE SERPL-MCNC: 134 MG/DL (ref 65–99)
GLUCOSE SERPL-MCNC: 96 MG/DL (ref 65–99)
GLUCOSE UR STRIP-MCNC: NEGATIVE MG/DL
HBA1C MFR BLD: 6.1 % (ref 4.8–5.6)
HCT VFR BLD AUTO: 41.2 % (ref 34–46.6)
HCT VFR BLD AUTO: 44.6 % (ref 34–46.6)
HDLC SERPL-MCNC: 54 MG/DL (ref 40–60)
HGB BLD-MCNC: 13.3 G/DL (ref 12–15.9)
HGB BLD-MCNC: 14.5 G/DL (ref 12–15.9)
HGB UR QL STRIP.AUTO: ABNORMAL
HOLD SPECIMEN: NORMAL
HYALINE CASTS UR QL AUTO: ABNORMAL /LPF
IMM GRANULOCYTES # BLD AUTO: 0.03 10*3/MM3 (ref 0–0.05)
IMM GRANULOCYTES # BLD AUTO: 0.03 10*3/MM3 (ref 0–0.05)
IMM GRANULOCYTES NFR BLD AUTO: 0.3 % (ref 0–0.5)
IMM GRANULOCYTES NFR BLD AUTO: 0.4 % (ref 0–0.5)
KETONES UR QL STRIP: NEGATIVE
LDLC SERPL CALC-MCNC: 143 MG/DL (ref 0–100)
LDLC/HDLC SERPL: 2.6 {RATIO}
LEFT ATRIUM VOLUME INDEX: 23.9 ML/M2
LEUKOCYTE ESTERASE UR QL STRIP.AUTO: NEGATIVE
LV EF BIPLANE MOD: 59.8 %
LYMPHOCYTES # BLD AUTO: 0.9 10*3/MM3 (ref 0.7–3.1)
LYMPHOCYTES # BLD AUTO: 1.02 10*3/MM3 (ref 0.7–3.1)
LYMPHOCYTES NFR BLD AUTO: 10.6 % (ref 19.6–45.3)
LYMPHOCYTES NFR BLD AUTO: 11.8 % (ref 19.6–45.3)
MCH RBC QN AUTO: 29.8 PG (ref 26.6–33)
MCH RBC QN AUTO: 30.2 PG (ref 26.6–33)
MCHC RBC AUTO-ENTMCNC: 32.3 G/DL (ref 31.5–35.7)
MCHC RBC AUTO-ENTMCNC: 32.5 G/DL (ref 31.5–35.7)
MCV RBC AUTO: 91.6 FL (ref 79–97)
MCV RBC AUTO: 93.4 FL (ref 79–97)
METHADONE UR QL SCN: NEGATIVE
MONOCYTES # BLD AUTO: 0.36 10*3/MM3 (ref 0.1–0.9)
MONOCYTES # BLD AUTO: 0.61 10*3/MM3 (ref 0.1–0.9)
MONOCYTES NFR BLD AUTO: 4.2 % (ref 5–12)
MONOCYTES NFR BLD AUTO: 7.2 % (ref 5–12)
NEUTROPHILS NFR BLD AUTO: 6.85 10*3/MM3 (ref 1.7–7)
NEUTROPHILS NFR BLD AUTO: 7.11 10*3/MM3 (ref 1.7–7)
NEUTROPHILS NFR BLD AUTO: 80.9 % (ref 42.7–76)
NEUTROPHILS NFR BLD AUTO: 82.1 % (ref 42.7–76)
NITRITE UR QL STRIP: NEGATIVE
NRBC BLD AUTO-RTO: 0 /100 WBC (ref 0–0.2)
NRBC BLD AUTO-RTO: 0 /100 WBC (ref 0–0.2)
NT-PROBNP SERPL-MCNC: 382.8 PG/ML (ref 0–900)
OPIATES UR QL: NEGATIVE
OXYCODONE UR QL SCN: NEGATIVE
PCP UR QL SCN: NEGATIVE
PH UR STRIP.AUTO: 7.5 [PH] (ref 5–8)
PLATELET # BLD AUTO: 166 10*3/MM3 (ref 140–450)
PLATELET # BLD AUTO: 249 10*3/MM3 (ref 140–450)
PMV BLD AUTO: 10 FL (ref 6–12)
PMV BLD AUTO: 9.9 FL (ref 6–12)
POTASSIUM SERPL-SCNC: 3.7 MMOL/L (ref 3.5–5.2)
POTASSIUM SERPL-SCNC: 4 MMOL/L (ref 3.5–5.2)
PROT ?TM UR-MCNC: 90.3 MG/DL
PROT SERPL-MCNC: 8.2 G/DL (ref 6–8.5)
PROT UR QL STRIP: ABNORMAL
PROT/CREAT UR: 1.76 MG/G{CREAT}
QT INTERVAL: 539 MS
QTC INTERVAL: 487 MS
RBC # BLD AUTO: 4.41 10*6/MM3 (ref 3.77–5.28)
RBC # BLD AUTO: 4.87 10*6/MM3 (ref 3.77–5.28)
RBC # UR STRIP: ABNORMAL /HPF
REF LAB TEST METHOD: ABNORMAL
SALICYLATES SERPL-MCNC: <0.3 MG/DL
SODIUM SERPL-SCNC: 139 MMOL/L (ref 136–145)
SODIUM SERPL-SCNC: 141 MMOL/L (ref 136–145)
SP GR UR STRIP: 1.01 (ref 1–1.03)
SQUAMOUS #/AREA URNS HPF: ABNORMAL /HPF
T4 FREE SERPL-MCNC: 1.24 NG/DL (ref 0.92–1.68)
TRICYCLICS UR QL SCN: NEGATIVE
TRIGL SERPL-MCNC: 107 MG/DL (ref 0–150)
TROPONIN T NUMERIC DELTA: -3 NG/L
TROPONIN T SERPL HS-MCNC: 10 NG/L
TSH SERPL DL<=0.05 MIU/L-ACNC: 26.86 UIU/ML (ref 0.27–4.2)
UROBILINOGEN UR QL STRIP: ABNORMAL
VLDLC SERPL-MCNC: 19 MG/DL (ref 5–40)
WBC # UR STRIP: ABNORMAL /HPF
WBC NRBC COR # BLD AUTO: 8.46 10*3/MM3 (ref 3.4–10.8)
WBC NRBC COR # BLD AUTO: 8.66 10*3/MM3 (ref 3.4–10.8)
WHOLE BLOOD HOLD COAG: NORMAL
YEAST URNS QL MICRO: ABNORMAL /HPF

## 2025-05-07 PROCEDURE — 84439 ASSAY OF FREE THYROXINE: CPT | Performed by: EMERGENCY MEDICINE

## 2025-05-07 PROCEDURE — P9612 CATHETERIZE FOR URINE SPEC: HCPCS

## 2025-05-07 PROCEDURE — 85025 COMPLETE CBC W/AUTO DIFF WBC: CPT | Performed by: STUDENT IN AN ORGANIZED HEALTH CARE EDUCATION/TRAINING PROGRAM

## 2025-05-07 PROCEDURE — 36415 COLL VENOUS BLD VENIPUNCTURE: CPT | Performed by: STUDENT IN AN ORGANIZED HEALTH CARE EDUCATION/TRAINING PROGRAM

## 2025-05-07 PROCEDURE — 84484 ASSAY OF TROPONIN QUANT: CPT | Performed by: EMERGENCY MEDICINE

## 2025-05-07 PROCEDURE — 87040 BLOOD CULTURE FOR BACTERIA: CPT | Performed by: EMERGENCY MEDICINE

## 2025-05-07 PROCEDURE — 99222 1ST HOSP IP/OBS MODERATE 55: CPT | Performed by: ORTHOPAEDIC SURGERY

## 2025-05-07 PROCEDURE — 83880 ASSAY OF NATRIURETIC PEPTIDE: CPT | Performed by: EMERGENCY MEDICINE

## 2025-05-07 PROCEDURE — 25010000002 MORPHINE PER 10 MG: Performed by: STUDENT IN AN ORGANIZED HEALTH CARE EDUCATION/TRAINING PROGRAM

## 2025-05-07 PROCEDURE — 80061 LIPID PANEL: CPT | Performed by: STUDENT IN AN ORGANIZED HEALTH CARE EDUCATION/TRAINING PROGRAM

## 2025-05-07 PROCEDURE — 99291 CRITICAL CARE FIRST HOUR: CPT

## 2025-05-07 PROCEDURE — 85025 COMPLETE CBC W/AUTO DIFF WBC: CPT | Performed by: EMERGENCY MEDICINE

## 2025-05-07 PROCEDURE — 25010000002 HYDRALAZINE PER 20 MG: Performed by: PHYSICIAN ASSISTANT

## 2025-05-07 PROCEDURE — 93306 TTE W/DOPPLER COMPLETE: CPT

## 2025-05-07 PROCEDURE — 81001 URINALYSIS AUTO W/SCOPE: CPT | Performed by: EMERGENCY MEDICINE

## 2025-05-07 PROCEDURE — 99223 1ST HOSP IP/OBS HIGH 75: CPT | Performed by: STUDENT IN AN ORGANIZED HEALTH CARE EDUCATION/TRAINING PROGRAM

## 2025-05-07 PROCEDURE — 73600 X-RAY EXAM OF ANKLE: CPT

## 2025-05-07 PROCEDURE — 82077 ASSAY SPEC XCP UR&BREATH IA: CPT | Performed by: EMERGENCY MEDICINE

## 2025-05-07 PROCEDURE — 84443 ASSAY THYROID STIM HORMONE: CPT | Performed by: EMERGENCY MEDICINE

## 2025-05-07 PROCEDURE — 84156 ASSAY OF PROTEIN URINE: CPT | Performed by: PHYSICIAN ASSISTANT

## 2025-05-07 PROCEDURE — 80179 DRUG ASSAY SALICYLATE: CPT | Performed by: EMERGENCY MEDICINE

## 2025-05-07 PROCEDURE — 73610 X-RAY EXAM OF ANKLE: CPT

## 2025-05-07 PROCEDURE — 80053 COMPREHEN METABOLIC PANEL: CPT | Performed by: EMERGENCY MEDICINE

## 2025-05-07 PROCEDURE — 25810000003 LACTATED RINGERS PER 1000 ML: Performed by: PHYSICIAN ASSISTANT

## 2025-05-07 PROCEDURE — 94761 N-INVAS EAR/PLS OXIMETRY MLT: CPT

## 2025-05-07 PROCEDURE — 25810000003 LACTATED RINGERS PER 1000 ML: Performed by: STUDENT IN AN ORGANIZED HEALTH CARE EDUCATION/TRAINING PROGRAM

## 2025-05-07 PROCEDURE — 80307 DRUG TEST PRSMV CHEM ANLYZR: CPT | Performed by: EMERGENCY MEDICINE

## 2025-05-07 PROCEDURE — 82570 ASSAY OF URINE CREATININE: CPT | Performed by: PHYSICIAN ASSISTANT

## 2025-05-07 PROCEDURE — 93005 ELECTROCARDIOGRAM TRACING: CPT | Performed by: EMERGENCY MEDICINE

## 2025-05-07 PROCEDURE — 71045 X-RAY EXAM CHEST 1 VIEW: CPT

## 2025-05-07 PROCEDURE — 25810000003 SODIUM CHLORIDE 0.9 % SOLUTION: Performed by: EMERGENCY MEDICINE

## 2025-05-07 PROCEDURE — 93306 TTE W/DOPPLER COMPLETE: CPT | Performed by: INTERNAL MEDICINE

## 2025-05-07 PROCEDURE — 87086 URINE CULTURE/COLONY COUNT: CPT | Performed by: EMERGENCY MEDICINE

## 2025-05-07 PROCEDURE — 83036 HEMOGLOBIN GLYCOSYLATED A1C: CPT | Performed by: STUDENT IN AN ORGANIZED HEALTH CARE EDUCATION/TRAINING PROGRAM

## 2025-05-07 PROCEDURE — 25010000002 ATROPINE SULFATE 0.1 MG/ML: Performed by: EMERGENCY MEDICINE

## 2025-05-07 PROCEDURE — 80143 DRUG ASSAY ACETAMINOPHEN: CPT | Performed by: EMERGENCY MEDICINE

## 2025-05-07 PROCEDURE — 70450 CT HEAD/BRAIN W/O DYE: CPT

## 2025-05-07 PROCEDURE — 25010000002 CEFTRIAXONE PER 250 MG: Performed by: PHYSICIAN ASSISTANT

## 2025-05-07 PROCEDURE — 82550 ASSAY OF CK (CPK): CPT | Performed by: EMERGENCY MEDICINE

## 2025-05-07 PROCEDURE — 94799 UNLISTED PULMONARY SVC/PX: CPT

## 2025-05-07 PROCEDURE — 83605 ASSAY OF LACTIC ACID: CPT | Performed by: EMERGENCY MEDICINE

## 2025-05-07 RX ORDER — CLOPIDOGREL BISULFATE 75 MG/1
75 TABLET ORAL DAILY
COMMUNITY

## 2025-05-07 RX ORDER — HYDROXYZINE HYDROCHLORIDE 50 MG/1
1.5-2 TABLET, FILM COATED ORAL NIGHTLY PRN
COMMUNITY
Start: 2024-11-14

## 2025-05-07 RX ORDER — SODIUM CHLORIDE 9 MG/ML
40 INJECTION, SOLUTION INTRAVENOUS AS NEEDED
Status: DISCONTINUED | OUTPATIENT
Start: 2025-05-07 | End: 2025-05-12 | Stop reason: HOSPADM

## 2025-05-07 RX ORDER — MORPHINE SULFATE 2 MG/ML
2 INJECTION, SOLUTION INTRAMUSCULAR; INTRAVENOUS EVERY 4 HOURS PRN
Status: DISCONTINUED | OUTPATIENT
Start: 2025-05-07 | End: 2025-05-08 | Stop reason: SDUPTHER

## 2025-05-07 RX ORDER — BISACODYL 10 MG
10 SUPPOSITORY, RECTAL RECTAL DAILY PRN
Status: DISCONTINUED | OUTPATIENT
Start: 2025-05-07 | End: 2025-05-08 | Stop reason: SDUPTHER

## 2025-05-07 RX ORDER — ONDANSETRON 2 MG/ML
4 INJECTION INTRAMUSCULAR; INTRAVENOUS EVERY 6 HOURS PRN
Status: DISCONTINUED | OUTPATIENT
Start: 2025-05-07 | End: 2025-05-08 | Stop reason: SDUPTHER

## 2025-05-07 RX ORDER — ZOLPIDEM TARTRATE 5 MG
5 TABLET ORAL NIGHTLY
Status: ON HOLD | COMMUNITY
Start: 2025-05-06 | End: 2025-05-07

## 2025-05-07 RX ORDER — FAMOTIDINE 20 MG/1
20 TABLET, FILM COATED ORAL NIGHTLY PRN
Status: DISCONTINUED | OUTPATIENT
Start: 2025-05-07 | End: 2025-05-12 | Stop reason: HOSPADM

## 2025-05-07 RX ORDER — OXYCODONE HYDROCHLORIDE 5 MG/1
5 TABLET ORAL
COMMUNITY

## 2025-05-07 RX ORDER — NITROGLYCERIN 0.4 MG/1
0.4 TABLET SUBLINGUAL
Status: DISCONTINUED | OUTPATIENT
Start: 2025-05-07 | End: 2025-05-12 | Stop reason: HOSPADM

## 2025-05-07 RX ORDER — SODIUM CHLORIDE, SODIUM LACTATE, POTASSIUM CHLORIDE, CALCIUM CHLORIDE 600; 310; 30; 20 MG/100ML; MG/100ML; MG/100ML; MG/100ML
50 INJECTION, SOLUTION INTRAVENOUS CONTINUOUS
Status: DISCONTINUED | OUTPATIENT
Start: 2025-05-07 | End: 2025-05-08

## 2025-05-07 RX ORDER — AMOXICILLIN 250 MG
2 CAPSULE ORAL 2 TIMES DAILY PRN
Status: DISCONTINUED | OUTPATIENT
Start: 2025-05-07 | End: 2025-05-08 | Stop reason: SDUPTHER

## 2025-05-07 RX ORDER — ACETAMINOPHEN 325 MG/1
650 TABLET ORAL EVERY 6 HOURS PRN
Status: DISCONTINUED | OUTPATIENT
Start: 2025-05-07 | End: 2025-05-12 | Stop reason: HOSPADM

## 2025-05-07 RX ORDER — ZOLPIDEM TARTRATE 5 MG/1
5 TABLET ORAL NIGHTLY
COMMUNITY

## 2025-05-07 RX ORDER — NOREPINEPHRINE BITARTRATE 0.03 MG/ML
.02-.3 INJECTION, SOLUTION INTRAVENOUS
Status: DISCONTINUED | OUTPATIENT
Start: 2025-05-07 | End: 2025-05-07

## 2025-05-07 RX ORDER — POLYETHYLENE GLYCOL 3350 17 G/17G
17 POWDER, FOR SOLUTION ORAL DAILY PRN
Status: DISCONTINUED | OUTPATIENT
Start: 2025-05-07 | End: 2025-05-08 | Stop reason: SDUPTHER

## 2025-05-07 RX ORDER — OXYCODONE HYDROCHLORIDE 5 MG/1
5 TABLET ORAL EVERY 4 HOURS PRN
Refills: 0 | Status: DISCONTINUED | OUTPATIENT
Start: 2025-05-07 | End: 2025-05-12 | Stop reason: HOSPADM

## 2025-05-07 RX ORDER — HYDRALAZINE HYDROCHLORIDE 10 MG/1
10 TABLET, FILM COATED ORAL EVERY 12 HOURS SCHEDULED
Status: DISCONTINUED | OUTPATIENT
Start: 2025-05-07 | End: 2025-05-08

## 2025-05-07 RX ORDER — BISACODYL 5 MG/1
5 TABLET, DELAYED RELEASE ORAL DAILY PRN
Status: DISCONTINUED | OUTPATIENT
Start: 2025-05-07 | End: 2025-05-08 | Stop reason: SDUPTHER

## 2025-05-07 RX ORDER — ASPIRIN 81 MG/1
81 TABLET, CHEWABLE ORAL DAILY
COMMUNITY

## 2025-05-07 RX ORDER — GABAPENTIN 800 MG/1
800 TABLET ORAL
COMMUNITY

## 2025-05-07 RX ORDER — LEVOTHYROXINE SODIUM 25 UG/1
25 TABLET ORAL
Status: DISCONTINUED | OUTPATIENT
Start: 2025-05-07 | End: 2025-05-12 | Stop reason: HOSPADM

## 2025-05-07 RX ORDER — OXYCODONE HYDROCHLORIDE 5 MG/1
10 TABLET ORAL EVERY 4 HOURS PRN
Refills: 0 | Status: DISCONTINUED | OUTPATIENT
Start: 2025-05-07 | End: 2025-05-12 | Stop reason: HOSPADM

## 2025-05-07 RX ORDER — SODIUM CHLORIDE 0.9 % (FLUSH) 0.9 %
10 SYRINGE (ML) INJECTION AS NEEDED
Status: DISCONTINUED | OUTPATIENT
Start: 2025-05-07 | End: 2025-05-12 | Stop reason: HOSPADM

## 2025-05-07 RX ORDER — SODIUM CHLORIDE 0.9 % (FLUSH) 0.9 %
10 SYRINGE (ML) INJECTION EVERY 12 HOURS SCHEDULED
Status: DISCONTINUED | OUTPATIENT
Start: 2025-05-07 | End: 2025-05-12 | Stop reason: HOSPADM

## 2025-05-07 RX ORDER — HYDRALAZINE HYDROCHLORIDE 20 MG/ML
10 INJECTION INTRAMUSCULAR; INTRAVENOUS EVERY 6 HOURS PRN
Status: DISCONTINUED | OUTPATIENT
Start: 2025-05-07 | End: 2025-05-12 | Stop reason: HOSPADM

## 2025-05-07 RX ADMIN — SODIUM CHLORIDE 1000 ML: 9 INJECTION, SOLUTION INTRAVENOUS at 00:52

## 2025-05-07 RX ADMIN — OXYCODONE 5 MG: 5 TABLET ORAL at 16:22

## 2025-05-07 RX ADMIN — SODIUM CHLORIDE 1000 ML: 9 INJECTION, SOLUTION INTRAVENOUS at 01:20

## 2025-05-07 RX ADMIN — CEFTRIAXONE SODIUM 2000 MG: 2 INJECTION, POWDER, FOR SOLUTION INTRAMUSCULAR; INTRAVENOUS at 10:57

## 2025-05-07 RX ADMIN — ATROPINE SULFATE 0.5 MG: 0.1 INJECTION INTRAVENOUS at 01:28

## 2025-05-07 RX ADMIN — HYDRALAZINE HYDROCHLORIDE 10 MG: 10 TABLET ORAL at 19:36

## 2025-05-07 RX ADMIN — MORPHINE SULFATE 2 MG: 2 INJECTION, SOLUTION INTRAMUSCULAR; INTRAVENOUS at 07:26

## 2025-05-07 RX ADMIN — HYDRALAZINE HYDROCHLORIDE 10 MG: 20 INJECTION INTRAMUSCULAR; INTRAVENOUS at 22:57

## 2025-05-07 RX ADMIN — TIZANIDINE 2 MG: 4 TABLET ORAL at 22:57

## 2025-05-07 RX ADMIN — MORPHINE SULFATE 2 MG: 2 INJECTION, SOLUTION INTRAMUSCULAR; INTRAVENOUS at 18:38

## 2025-05-07 RX ADMIN — MORPHINE SULFATE 2 MG: 2 INJECTION, SOLUTION INTRAMUSCULAR; INTRAVENOUS at 22:20

## 2025-05-07 RX ADMIN — SODIUM CHLORIDE, POTASSIUM CHLORIDE, SODIUM LACTATE AND CALCIUM CHLORIDE 75 ML/HR: 600; 310; 30; 20 INJECTION, SOLUTION INTRAVENOUS at 12:25

## 2025-05-07 RX ADMIN — OXYCODONE 10 MG: 5 TABLET ORAL at 20:18

## 2025-05-07 RX ADMIN — Medication 10 ML: at 09:13

## 2025-05-07 RX ADMIN — SODIUM CHLORIDE 1000 ML: 9 INJECTION, SOLUTION INTRAVENOUS at 00:49

## 2025-05-07 RX ADMIN — Medication 10 ML: at 20:19

## 2025-05-07 RX ADMIN — SODIUM CHLORIDE, POTASSIUM CHLORIDE, SODIUM LACTATE AND CALCIUM CHLORIDE 150 ML/HR: 600; 310; 30; 20 INJECTION, SOLUTION INTRAVENOUS at 04:03

## 2025-05-07 RX ADMIN — OXYCODONE 5 MG: 5 TABLET ORAL at 10:57

## 2025-05-07 RX ADMIN — MORPHINE SULFATE 2 MG: 2 INJECTION, SOLUTION INTRAMUSCULAR; INTRAVENOUS at 14:04

## 2025-05-07 NOTE — H&P
[facility]   Consult Note    Patient Name: Davidson Lyle  : 1970  MRN: [id number]  Primary Care Physician:  Madi Monson MD  Referring Physician: No ref. provider found  Date of admission: 2025    Subjective   Subjective     Reason for Consult/ Chief Complaint: Right ankle fracture    HPI:  Davidson Lyle is a 54 y.o. female history of hypothyroidism and hypertension as presented to the emergency room after a fall she fell forward as she was going the bathroom and fractured her right ankle reduction was done by the emergency room I was consulted and she was admitted for medical reasons the hospitalist are seeing and evaluating    Review of Systems   14 Point ROS is negative except as noted above.     Personal History     Past Medical History:   Diagnosis Date    Depression     Disease of thyroid gland     Elevated cholesterol     GERD (gastroesophageal reflux disease)     Hypertension     PONV (postoperative nausea and vomiting)     PTSD (post-traumatic stress disorder)     Von Willebrand disease        Past Surgical History:   Procedure Laterality Date    BACK SURGERY      COSMETIC SURGERY         Family History: family history includes Arthritis in her paternal grandmother; Early death in her father; Heart disease in her father; Hyperlipidemia in her father; Hypertension in her father. Otherwise pertinent FHx was reviewed and not pertinent to current issue.    Social History:  reports that she has been smoking cigarettes. She has a 8.5 pack-year smoking history. She does not have any smokeless tobacco history on file. She reports that she does not drink alcohol and does not use drugs.    Home Medications:  HYDROcodone-acetaminophen, amitriptyline, cyanocobalamin, ergocalciferol, famotidine, gabapentin, levothyroxine, lidocaine, losartan, nebivolol, and tiZANidine    Allergies:  Allergies   Allergen Reactions    Duloxetine Hcl Hallucinations    Baclofen Unknown - Low Severity     Metaxalone Unknown - Low Severity    Sulfa Antibiotics Unknown - Low Severity    Latex Rash       Objective    Objective     Vitals:   Temp:  [97.5 °F (36.4 °C)-98.2 °F (36.8 °C)] 98.2 °F (36.8 °C)  Heart Rate:  [48-74] 74  Resp:  [12-20] 20  BP: ()/(39-99) 186/99    Physical Exam:   Constitutional: Awake, alert   HENT: Atraumatic, Normocephalic   Respiratory: Nonlabored respirations    Cardiovascular: Intact peripheral pulses    Musculoskeletal: Clavicles shoulders elbows wrist and hand are nontender bilateral knees are nontender left ankle is nontender she is in the right ankle splint brisk cap refill to her toes sensations grossly intact x-rays reveal a soup phonation external Tatian fracture pattern bimalleolar tract fracture     Imaging:  Imaging Results (Last 24 Hours)       Procedure Component Value Units Date/Time    CT Head Without Contrast [[id number]] Collected: 05/07/25 0155     Updated: 05/07/25 0201    Narrative:      CT HEAD WO CONTRAST    Date of exam: 5/7/2025 1:39 AM EDT.    Indications: ams (altered mental status); ha (headache)    Comparison: 1/29/2023.    TECHNIQUE:  Axial CT images were obtained of the head without contrast administration. Reconstructed 2D coronal and sagittal images were also obtained. Automated exposure control and iterative construction methods were used. Additionally, the radiation dose   reduction device was turned on for each scan per the ALARA (As Low as Reasonably Achievable) protocol. Please see the electronic medical record (EMR) for the recorded radiation dose.    FINDINGS:  A routine nonenhanced head CT was performed. No acute brain abnormality is seen. No acute infarct. No acute intracranial hemorrhage. No midline shift or acute intracranial mass effect is seen. The ventricular system and the extra-axial spaces are   minimally prominent. There is suspected mild chronic small vessel ischemic disease. Arterial and basal ganglia calcifications are seen. No acute  skull fracture is identified. Minimal age-indeterminate mucosal thickening and/or retained secretions   involve(s) the imaged paranasal sinuses. No air-fluid interfaces are seen within the imaged paranasal sinuses. No significant acute findings are seen with regard to the imaged orbits or middle ear clefts.        Impression:      No acute brain abnormality is appreciated. No acute infarct. No acute intracranial hemorrhage. No acute skull fracture.           Portions of this note were completed with a voice recognition program.    Electronically Signed: Jarred Farley MD    5/7/2025 1:59 AM EDT    Workstation ID: [id number]    XR Ankle 2 View Right [[id number]] Collected: 05/07/25 0141     Updated: 05/07/25 0144    Narrative:      XR ANKLE 2 VW RIGHT-     Date of exam: 5/7/2025, 12:55 A.M.     Comparison: 5/7/2025, 12:41 a.m.     INDICATIONS:  54-year-old female for imaging follow-up after closed reduction of an  acute right ankle fracture-dislocation.     FINDINGS:  Two (2) nonweightbearing views were obtained. There has been interval  closed reduction of the acute right ankle fracture-dislocation with  interval improvement in alignment. A cast is in place obscuring detail  if symptoms or clinical concerns persist, consider imaging follow-up.          Impression:      Interval improvement in alignment is noted after closed reduction of the  right ankle-fracture dislocation.        Portions of this note were completed with a voice recognition program.     5/7/2025 1:42 AM by Jarred Farley MD on Workstation: [id number]       XR Chest 1 View [[id number]] Collected: 05/07/25 0137     Updated: 05/07/25 0143    Narrative:      XR CHEST 1 VW-     Date of exam: 5/7/2025 12:50 AM.     Comparison: 1/29/2023.     INDICATIONS:  54-year-old female with history of cough (persistent); SOA/SOB/shortness  of air/shortness of breath; h/o right ankle acute fracture-dislocation.     FINDINGS:  A single AP (or PA) upright portable chest  radiograph was performed. No  cardiac enlargement is seen. No acute infiltrate is appreciated. No  pleural effusion or pneumothorax is identified. There are postoperative  changes of the cervical spine. External artifacts are noted. There is  minimal lateral curvature of the cervicothoracic spine with the  convexity to the left, which may be fixed or positional in nature. There  is interval improvement in lung expansion since 1/29/2023. Otherwise, no  significant interval change is seen since the prior study (or studies).          Impression:      No acute infiltrate is appreciated. No cardiac enlargement. No  pneumothorax.           Portions of this note were completed with a voice recognition program.     5/7/2025 1:41 AM by Jarred Farley MD on Workstation: [id number]       XR Ankle 3+ View Right [[id number]] Collected: 05/07/25 0104     Updated: 05/07/25 0113    Narrative:      XR ANKLE 3+ VW RIGHT-     Date of exam: 5/7/2025 12:43 AM.     Comparison: 4/16/2016.     INDICATIONS:  54-year-old female who fell w/ right ankle pain & deformity;  trauma/injury; initial encounter.     FINDINGS:  Three (3) nonweightbearing views of the right ankle were obtained. The  study is ABNORMAL. There is an acute trimalleolar (Gould type B)  fracture-dislocation involving the right ankle. That is, there is  lateral or valgus displacement of the right talus and medial malleolus  (of the right tibia) relative to the more proximal right tibia,  estimated at 1.2 cm. The displays medial malleolar fracture is  intra-articular. There is an obliquely oriented intra-articular fracture  of the distal right fibula; it is slightly distracted, estimated at 4  mm. A nondisplaced comminuted posterior malleolar fracture involves the  distal right tibia, as well. Acute soft tissue contusion is centered  about the fractures. The fractures are thought to be closed. No other  acute fractures are seen. Mild enthesopathy involves the plantar  right  calcaneus. External artifacts are noted. The best possible images were  obtained, considering the patient's condition. No retained radiopaque  foreign body. No definite subcutaneous emphysema. If symptoms or  clinical concerns persist, consider imaging follow-up.          Impression:      There is an acute trimalleolar fracture-dislocation involving the right  ankle, as discussed.        Portions of this note were completed with a voice recognition program.     5/7/2025 1:11 AM by Jarred Farley MD on Workstation: [email or web address]                Result Review    Result Review:  I have personally reviewed the results from the time of this admission to 5/7/2025 09:14 EDT and agree with these findings:  []  Laboratory  []  Microbiology  []  Radiology  []  EKG/Telemetry   []  Cardiology/Vascular   []  Pathology  []  Old records  []  Other:      Assessment & Plan   Assessment / Plan     Brief Patient Summary:  Davidson Lyle is a 54 y.o. female who sustained a displaced ankle fracture    Active Hospital Problems:  Active Hospital Problems    Diagnosis     **Hypotension        Plan: Discussed the risk and benefits of proceeding with reduction and fixation bleeding infection death blood clots lung problems heart attacks possible need for future surgery possible damage neurovascular structures no guarantees given among others and she wished to proceed.      Electronically signed by Zack Simmons MD, 05/07/25, 9:14 AM EDT.

## 2025-05-07 NOTE — SIGNIFICANT NOTE
05/07/25 0749   Physical Therapy Time and Intention   Session Not Performed other (see comments)  (Hold, pending ortho consult for R ankle fx)

## 2025-05-07 NOTE — PROGRESS NOTES
[facility]   Hospitalist Progress Note  Date: 2025  Patient Name: Davidson Lyle  : 1970  MRN: [id number]  Date of admission: 2025      Subjective   Subjective     Chief Complaint: Fall    Summary: Davidson Lyle is a 54 y.o. female with past medical history of hypothyroidism and hypertension presents to the ED due to fall.  Patient states she woke up in the [place] to go to the restroom and fell forward.  Patient denies hitting her head.  When EMS arrived patient was found somnolent.  Patient takes many meds at night that can make her drowsy.  Denies chest pain, shortness of breath, abdominal pain, nausea, vomit, diarrhea or fever.  In the ED, patient's vitals show heart rate 58, temperature 97.6, blood pressure 79/43 and satting 100% room air.  Labs show troponin 10, sodium 139, creatinine 1.9, bicarb 18, TSH 26.8, white blood cell 8.6 and hemoglobin 14.5.  Chest x-ray unremarkable.  Ankle x-ray shows acute trimalleolar fracture.  Orthopedic consulted and will evaluate in the morning.  Patient admitted to floors for further management.     Interval Followup: Patient seen and examined resting comfortably hypotension resolved  Blood pressures now on the higher side but patient is having quite a bit of pain adjust pain medications decrease IV fluids orthopedic surgery consulted planning on surgical correction 2025    Objective   Objective     Vitals:   Temp:  [97.5 °F (36.4 °C)-98.8 °F (37.1 °C)] 98.8 °F (37.1 °C)  Heart Rate:  [48-96] 96  Resp:  [12-20] 18  BP: ()/(39-99) 182/90    Physical Exam   Constitutional: Awake alert no acute distress  Respiratory: Clear  Cardiovascular RRR  GI: Abdomen soft nontender      Result Review    Result Review:  I have reviewed the following  [x]  Laboratory  CBC          2024    10:23 2025    00:49 2025    04:50   CBC   WBC 8.13  8.66  8.46    RBC 5.34  4.87  4.41    Hemoglobin 16.3  14.5  13.3    Hematocrit 49.2  44.6   41.2    MCV 92.1  91.6  93.4    MCH 30.5  29.8  30.2    MCHC 33.1  32.5  32.3    RDW 14.0  14.5  14.4    Platelets 263  249  166      CMP          12/13/2024    10:23 5/7/2025    00:49 5/7/2025    04:50   CMP   Glucose 123  134  96    BUN 8  17  17    Creatinine 1.59  1.90  1.59    EGFR 38.0  30.7  38.0    Sodium 143  139  141    Potassium 4.4  3.7  4.0    Chloride 103  102  106    Calcium 10.3  9.5  8.6    Total Protein 8.8  8.2     Albumin 4.7  4.5     Globulin 4.1  3.7     Total Bilirubin 0.5  0.5     Alkaline Phosphatase 132  113     AST (SGOT) 19  20     ALT (SGPT) 14  16     Albumin/Globulin Ratio 1.1  1.2     BUN/Creatinine Ratio 5.0  8.9  10.7    Anion Gap 14.0  18.2  11.9        []  Microbiology  []  Radiology  []  EKG/Telemetry   []  Cardiology/Vascular   []  Pathology  []  Old records  []  Other:    Assessment & Plan   Assessment / Plan   Assessment:    Hypotension resolved  UTI  Mechanical fall  Right ankle fracture secondary to the above  Bradycardia resolved  CKD  Hypothyroidism  CVA  CAD  GERD  Von Willebrand disease    Plan:    Continue IV fluids but decrease rate  Start IV ceftriaxone pending culture results  Bedrest n.p.o. after midnight  Orthopedic surgery consulted planning on surgical correction 5/8/2025  Pain control with combination of IV morphine and oral oxycodone  Checking 2D echo  Checking thyroid function TSH elevated at 26 continue with Synthroid will need to see if this was recently started or if she has had a recent dose change  A.m. labs  Further treatment contingent upon her hospital course     Discussed plan with RN.    VTE Prophylaxis:  Mechanical VTE prophylaxis orders are present.        CODE STATUS:   Code Status (Patient has no pulse and is not breathing): CPR (Attempt to Resuscitate)  Medical Interventions (Patient has pulse or is breathing): Full Support        Electronically signed by RAFFI Avila, 05/07/25, 3:41 PM EDT.      Attending Note:  I have seen and examined  the patient and agree with the above information from Michael Atkins PA-C.  Electronically signed by Ron Hartley MD, 05/07/25, 4:56 PM EDT.

## 2025-05-07 NOTE — ED PROVIDER NOTES
Time: 2:14 AM EDT  Date of encounter:  5/7/2025  Independent Historian/Clinical History and Information was obtained by:   Patient    History is limited by: Altered Mental Status    Chief Complaint: fall      History of Present Illness:  Patient is a 54 y.o. year old female who presents to the emergency department for evaluation of Fall.  Patient states she got up to the restroom and tripped and fell.  Patient is extremely somnolent but states she just took all her nighttime medication prior to incident.  She denies any other injuries other than ankle pain.      Patient Care Team  Primary Care Provider: Madi Monson MD    Past Medical History:     Allergies   Allergen Reactions    Duloxetine Hcl Hallucinations    Baclofen Unknown - Low Severity    Metaxalone Unknown - Low Severity    Sulfa Antibiotics Unknown - Low Severity    Latex Rash     Past Medical History:   Diagnosis Date    Depression     Disease of thyroid gland     Elevated cholesterol     GERD (gastroesophageal reflux disease)     Hypertension     PONV (postoperative nausea and vomiting)     PTSD (post-traumatic stress disorder)     Von Willebrand disease      Past Surgical History:   Procedure Laterality Date    BACK SURGERY      COSMETIC SURGERY       Family History   Problem Relation Age of Onset    Hypertension Father     Hyperlipidemia Father     Heart disease Father     Early death Father     Arthritis Paternal Grandmother        Home Medications:  Prior to Admission medications    Medication Sig Start Date End Date Taking? Authorizing Provider   amitriptyline (ELAVIL) 10 MG tablet Take 10 mg by mouth Every Night.    Yvonne Nino MD   cyanocobalamin 1000 MCG/ML injection Inject 1 mL under the skin into the appropriate area as directed Every 14 (Fourteen) Days. 1/8/23   Yvonne Nino MD   ergocalciferol (ERGOCALCIFEROL) 1.25 MG (25353 UT) capsule Take 50,000 Units by mouth 1 (One) Time Per Week.    Yvonne Nino MD  "  famotidine (PEPCID) 20 MG tablet Take 20 mg by mouth At Night As Needed.    Yvonne Nino MD   gabapentin (NEURONTIN) 800 MG tablet Take 800 mg by mouth 4 (Four) Times a Day.    Yvonne Nino MD   HYDROcodone-acetaminophen (NORCO)  MG per tablet Take 1 tablet by mouth Every 8 (Eight) Hours As Needed.    Yvonne Nino MD   levothyroxine (SYNTHROID, LEVOTHROID) 25 MCG tablet Take 1 tablet by mouth Daily. 1/25/23   Yvonne Nino MD   lidocaine (LIDODERM) 5 % Place 1 patch on the skin as directed by provider Daily. May wear up to 12 hours 1/28/23   Yvonne Nino MD   losartan (COZAAR) 50 MG tablet Take 1 tablet by mouth 2 (Two) Times a Day for 30 days. 2/1/23 3/3/23  Dago Manrique DO   nebivolol (BYSTOLIC) 10 MG tablet Take 1 tablet by mouth Daily for 30 days. 2/2/23 3/4/23  Dago Manrique DO   tiZANidine (ZANAFLEX) 2 MG tablet 4 mg Every 12 (Twelve) Hours.    Yvonne Nino MD        Social History:   Social History     Tobacco Use    Smoking status: Some Days     Current packs/day: 0.25     Average packs/day: 0.3 packs/day for 34.0 years (8.5 ttl pk-yrs)     Types: Cigarettes   Vaping Use    Vaping status: Never Used   Substance Use Topics    Alcohol use: Never    Drug use: Never         Review of Systems:  Review of Systems   Unable to perform ROS: Mental status change   Musculoskeletal:  Positive for arthralgias.        Physical Exam:  BP (!) 79/43   Pulse 58   Temp 97.6 °F (36.4 °C) (Oral)   Resp 12   Ht 167.6 cm (66\")   Wt 85.6 kg (188 lb 11.4 oz)   SpO2 100%   BMI 30.46 kg/m²     Physical Exam  Constitutional:       Comments: Somnolent and slow to respond   HENT:      Head: Normocephalic.      Right Ear: External ear normal.      Left Ear: External ear normal.   Eyes:      Extraocular Movements: Extraocular movements intact.      Conjunctiva/sclera: Conjunctivae normal.   Cardiovascular:      Rate and Rhythm: Regular rhythm. Bradycardia present. "   Pulmonary:      Effort: Pulmonary effort is normal. No respiratory distress.   Abdominal:      General: There is no distension.      Tenderness: There is no abdominal tenderness.   Musculoskeletal:      Comments: Obvious deformity of right ankle with 1+ DP/PT pulses.  Sensation intact.  Patient is able to move her toes.  No pain on range of motion of right knee or hip.  Full range of motion of other extremities without pain.     Skin:     General: Skin is warm.      Capillary Refill: Capillary refill takes less than 2 seconds.   Neurological:      Comments: Patient very somnolent and slow to respond.                    Medical Decision Making:      Comorbidities that affect care:    Hypertension, Thyroid Disease    External Notes reviewed:    Previous Radiological Studies: Bone density 4/1/24  Impression:     Impression:  Normal bone density       The following orders were placed and all results were independently analyzed by me:  Orders Placed This Encounter   Procedures    FX Dislocation Initial    Blood Culture - Blood,    Blood Culture - Blood,    XR Ankle 3+ View Right    XR Chest 1 View    CT Head Without Contrast    XR Ankle 2 View Right    Comprehensive Metabolic Panel    Urinalysis With Culture If Indicated - Urine, Clean Catch    Urine Drug Screen - Urine, Clean Catch    CBC Auto Differential    High Sensitivity Troponin T    BNP    CK    TSH Rfx On Abnormal To Free T4    Acetaminophen Level    Salicylate Level    Lactic Acid, Plasma    T4, Free    High Sensitivity Troponin T 1Hr    Ethanol    Fentanyl, Urine - Urine, Clean Catch    NPO Diet NPO Type: Strict NPO    Vital Signs    Intake & Output    Weigh Patient    Oral Care    Maintain IV Access    Telemetry - Place Orders & Notify Provider of Results When Patient Experiences Acute Chest Pain, Dysrhythmia or Respiratory Distress    Code Status and Medical Interventions: CPR (Attempt to Resuscitate); Full Support    IP General Consult (Use  specialty-specific consult if known)    Inpatient Hospitalist Consult    ECG 12 Lead Bradycardia    Insert Peripheral IV    Inpatient Admission    CBC & Differential    Extra Tubes    Gold Top - SST    Light Blue Top       Medications Given in the Emergency Department:  Medications   norepinephrine (LEVOPHED) 8 mg in 250 mL NS infusion (premix) (has no administration in time range)   sodium chloride 0.9 % flush 10 mL (has no administration in time range)   sodium chloride 0.9 % flush 10 mL (has no administration in time range)   sodium chloride 0.9 % infusion 40 mL (has no administration in time range)   nitroglycerin (NITROSTAT) SL tablet 0.4 mg (has no administration in time range)   sennosides-docusate (PERICOLACE) 8.6-50 MG per tablet 2 tablet (has no administration in time range)     And   polyethylene glycol (MIRALAX) packet 17 g (has no administration in time range)     And   bisacodyl (DULCOLAX) EC tablet 5 mg (has no administration in time range)     And   bisacodyl (DULCOLAX) suppository 10 mg (has no administration in time range)   sodium chloride 0.9 % bolus 1,000 mL (0 mL Intravenous Stopped 5/7/25 0120)   sodium chloride 0.9 % bolus 1,000 mL (1,000 mL Intravenous New Bag 5/7/25 0052)   sodium chloride 0.9 % bolus 1,000 mL (1,000 mL Intravenous New Bag 5/7/25 0120)   atropine sulfate injection 0.5 mg (0.5 mg Intravenous Given 5/7/25 0128)        ED Course:    ED Course as of 05/07/25 0249   Wed May 07, 2025   0249 ECG 12 Lead Bradycardia  Sinus bradycardia with rate of 49.  No acute ST elevation.  Prolonged VA interval.  Normal QTc.  Nonspecific T wave abnormality.  EKG interpreted by me [LD]      ED Course User Index  [LD] Rimma Murillo MD       Labs:    Lab Results (last 24 hours)       Procedure Component Value Units Date/Time    CBC & Differential [[id number]]  (Abnormal) Collected: 05/07/25 0049    Specimen: Blood Updated: 05/07/25 0106    Narrative:      The following orders were created for  panel order CBC & Differential.  Procedure                               Abnormality         Status                     ---------                               -----------         ------                     CBC Auto Differential[[id number]]        Abnormal            Final result                 Please view results for these tests on the individual orders.    Comprehensive Metabolic Panel [[id number]]  (Abnormal) Collected: 05/07/25 0049    Specimen: Blood Updated: 05/07/25 0127     Glucose 134 mg/dL      BUN 17 mg/dL      Creatinine 1.90 mg/dL      Sodium 139 mmol/L      Potassium 3.7 mmol/L      Chloride 102 mmol/L      CO2 18.8 mmol/L      Calcium 9.5 mg/dL      Total Protein 8.2 g/dL      Albumin 4.5 g/dL      ALT (SGPT) 16 U/L      AST (SGOT) 20 U/L      Alkaline Phosphatase 113 U/L      Total Bilirubin 0.5 mg/dL      Globulin 3.7 gm/dL      A/G Ratio 1.2 g/dL      BUN/Creatinine Ratio 8.9     Anion Gap 18.2 mmol/L      eGFR 30.7 mL/min/1.73     Narrative:      GFR Categories in Chronic Kidney Disease (CKD)              GFR Category          GFR (mL/min/1.73)    Interpretation  G1                    90 or greater        Normal or high (1)  G2                    60-89                Mild decrease (1)  G3a                   45-59                Mild to moderate decrease  G3b                   30-44                Moderate to severe decrease  G4                    15-29                Severe decrease  G5                    14 or less           Kidney failure    (1)In the absence of evidence of kidney disease, neither GFR category G1 or G2 fulfill the criteria for CKD.    eGFR calculation 2021 CKD-EPI creatinine equation, which does not include race as a factor    CBC Auto Differential [[id number]]  (Abnormal) Collected: 05/07/25 0049    Specimen: Blood Updated: 05/07/25 0106     WBC 8.66 10*3/mm3      RBC 4.87 10*6/mm3      Hemoglobin 14.5 g/dL      Hematocrit 44.6 %      MCV 91.6 fL      MCH 29.8 pg      MCHC 32.5  g/dL      RDW 14.5 %      RDW-SD 48.3 fl      MPV 10.0 fL      Platelets 249 10*3/mm3      Neutrophil % 82.1 %      Lymphocyte % 11.8 %      Monocyte % 4.2 %      Eosinophil % 1.0 %      Basophil % 0.6 %      Immature Grans % 0.3 %      Neutrophils, Absolute 7.11 10*3/mm3      Lymphocytes, Absolute 1.02 10*3/mm3      Monocytes, Absolute 0.36 10*3/mm3      Eosinophils, Absolute 0.09 10*3/mm3      Basophils, Absolute 0.05 10*3/mm3      Immature Grans, Absolute 0.03 10*3/mm3      nRBC 0.0 /100 WBC     High Sensitivity Troponin T [[id number]]  (Normal) Collected: 05/07/25 0049    Specimen: Blood Updated: 05/07/25 0147     HS Troponin T 10 ng/L     Narrative:      High Sensitive Troponin T Reference Range:  <14.0 ng/L- Negative Female for AMI  <22.0 ng/L- Negative Male for AMI  >=14 - Abnormal Female indicating possible myocardial injury.  >=22 - Abnormal Male indicating possible myocardial injury.   Clinicians would have to utilize clinical acumen, EKG, Troponin, and serial changes to determine if it is an Acute Myocardial Infarction or myocardial injury due to an underlying chronic condition.         BNP [[id number]]  (Normal) Collected: 05/07/25 0049    Specimen: Blood Updated: 05/07/25 0147     proBNP 382.8 pg/mL     Narrative:      This assay is used as an aid in the diagnosis of individuals suspected of having heart failure. It can be used as an aid in the diagnosis of acute decompensated heart failure (ADHF) in patients presenting with signs and symptoms of ADHF to the emergency department (ED). In addition, NT-proBNP of <300 pg/mL indicates ADHF is not likely.    Age Range Result Interpretation  NT-proBNP Concentration (pg/mL:      <50             Positive            >450                   Gray                 300-450                    Negative             <300    50-75           Positive            >900                  Gray                300-900                  Negative            <300      >75              Positive            >1800                  Gray                300-1800                  Negative            <300    CK [[id number]]  (Normal) Collected: 05/07/25 0049    Specimen: Blood Updated: 05/07/25 0142     Creatine Kinase 58 U/L     TSH Rfx On Abnormal To Free T4 [[id number]]  (Abnormal) Collected: 05/07/25 0049    Specimen: Blood Updated: 05/07/25 0147     TSH 26.860 uIU/mL     Acetaminophen Level [[id number]]  (Normal) Collected: 05/07/25 0049    Specimen: Blood Updated: 05/07/25 0142     Acetaminophen <5.0 mcg/mL     Salicylate Level [[id number]]  (Normal) Collected: 05/07/25 0049    Specimen: Blood Updated: 05/07/25 0142     Salicylate <0.3 mg/dL     T4, Free [[id number]]  (Normal) Collected: 05/07/25 0049    Specimen: Blood Updated: 05/07/25 0217     Free T4 1.24 ng/dL     Ethanol [[id number]] Collected: 05/07/25 0049    Specimen: Blood Updated: 05/07/25 0205     Ethanol <10 mg/dL      Ethanol % <0.010 %     Narrative:      Ethanol (Plasma)  <10 Essentially Negative    Toxic Concentrations           mg/dL    Flushing, slowing of reflexes    Impaired visual activity         Depression of CNS              >100  Possible Coma                  >300       Blood Culture - Blood, Arm, Right [[id number]] Collected: 05/07/25 0202    Specimen: Blood from Arm, Right Updated: 05/07/25 0207    Blood Culture - Blood, Arm, Left [[id number]] Collected: 05/07/25 0202    Specimen: Blood from Arm, Left Updated: 05/07/25 0206    Urinalysis With Culture If Indicated - Straight Cath [[id number]] Collected: 05/07/25 0236    Specimen: Urine from Straight Cath Updated: 05/07/25 0239    Urine Drug Screen - Straight Cath [[id number]] Collected: 05/07/25 0236    Specimen: Urine from Straight Cath Updated: 05/07/25 0239    Fentanyl, Urine - Straight Cath [[id number]] Collected: 05/07/25 0236    Specimen: Urine from Straight Cath Updated: 05/07/25 0239             Imaging:    CT Head Without Contrast  Result Date: 5/7/2025  CT HEAD  WO CONTRAST Date of exam: 5/7/2025 1:39 AM EDT. Indications: ams (altered mental status); ha (headache) Comparison: 1/29/2023. TECHNIQUE: Axial CT images were obtained of the head without contrast administration. Reconstructed 2D coronal and sagittal images were also obtained. Automated exposure control and iterative construction methods were used. Additionally, the radiation dose reduction device was turned on for each scan per the ALARA (As Low as Reasonably Achievable) protocol. Please see the electronic medical record (EMR) for the recorded radiation dose. FINDINGS: A routine nonenhanced head CT was performed. No acute brain abnormality is seen. No acute infarct. No acute intracranial hemorrhage. No midline shift or acute intracranial mass effect is seen. The ventricular system and the extra-axial spaces are minimally prominent. There is suspected mild chronic small vessel ischemic disease. Arterial and basal ganglia calcifications are seen. No acute skull fracture is identified. Minimal age-indeterminate mucosal thickening and/or retained secretions involve(s) the imaged paranasal sinuses. No air-fluid interfaces are seen within the imaged paranasal sinuses. No significant acute findings are seen with regard to the imaged orbits or middle ear clefts.     No acute brain abnormality is appreciated. No acute infarct. No acute intracranial hemorrhage. No acute skull fracture.    Portions of this note were completed with a voice recognition program. Electronically Signed: Jarred Farley MD  5/7/2025 1:59 AM EDT  Workstation ID: [id number]    XR Ankle 2 View Right  Result Date: 5/7/2025  XR ANKLE 2 VW RIGHT-  Date of exam: 5/7/2025, 12:55 A.M.  Comparison: 5/7/2025, 12:41 a.m.  INDICATIONS: 54-year-old female for imaging follow-up after closed reduction of an acute right ankle fracture-dislocation.  FINDINGS: Two (2) nonweightbearing views were obtained. There has been interval closed reduction of the acute right ankle  fracture-dislocation with interval improvement in alignment. A cast is in place obscuring detail if symptoms or clinical concerns persist, consider imaging follow-up.       Interval improvement in alignment is noted after closed reduction of the right ankle-fracture dislocation.   Portions of this note were completed with a voice recognition program.  5/7/2025 1:42 AM by Jarred Farley MD on Workstation: [email or web address]      XR Chest 1 View  Result Date: 5/7/2025  XR CHEST 1 VW-  Date of exam: 5/7/2025 12:50 AM.  Comparison: 1/29/2023.  INDICATIONS: 54-year-old female with history of cough (persistent); SOA/SOB/shortness of air/shortness of breath; h/o right ankle acute fracture-dislocation.  FINDINGS: A single AP (or PA) upright portable chest radiograph was performed. No cardiac enlargement is seen. No acute infiltrate is appreciated. No pleural effusion or pneumothorax is identified. There are postoperative changes of the cervical spine. External artifacts are noted. There is minimal lateral curvature of the cervicothoracic spine with the convexity to the left, which may be fixed or positional in nature. There is interval improvement in lung expansion since 1/29/2023. Otherwise, no significant interval change is seen since the prior study (or studies).       No acute infiltrate is appreciated. No cardiac enlargement. No pneumothorax.    Portions of this note were completed with a voice recognition program.  5/7/2025 1:41 AM by Jarred Farley MD on Workstation: [email or web address]      XR Ankle 3+ View Right  Result Date: 5/7/2025  XR ANKLE 3+ VW RIGHT-  Date of exam: 5/7/2025 12:43 AM.  Comparison: 4/16/2016.  INDICATIONS: 54-year-old female who fell w/ right ankle pain & deformity; trauma/injury; initial encounter.  FINDINGS: Three (3) nonweightbearing views of the right ankle were obtained. The study is ABNORMAL. There is an acute trimalleolar (Gould type B) fracture-dislocation involving the right ankle. That is, there is lateral  or valgus displacement of the right talus and medial malleolus (of the right tibia) relative to the more proximal right tibia, estimated at 1.2 cm. The displays medial malleolar fracture is intra-articular. There is an obliquely oriented intra-articular fracture of the distal right fibula; it is slightly distracted, estimated at 4 mm. A nondisplaced comminuted posterior malleolar fracture involves the distal right tibia, as well. Acute soft tissue contusion is centered about the fractures. The fractures are thought to be closed. No other acute fractures are seen. Mild enthesopathy involves the plantar right calcaneus. External artifacts are noted. The best possible images were obtained, considering the patient's condition. No retained radiopaque foreign body. No definite subcutaneous emphysema. If symptoms or clinical concerns persist, consider imaging follow-up.       There is an acute trimalleolar fracture-dislocation involving the right ankle, as discussed.   Portions of this note were completed with a voice recognition program.  5/7/2025 1:11 AM by Jarred Farley MD on Workstation: [email or web address]          Differential Diagnosis and Discussion:    Altered Mental Status: Based on the patient's signs and symptoms, differential diagnosis includes but is not limited to meningitis, stroke, sepsis, subarachnoid hemorrhage, intracranial bleeding, encephalitis, and metabolic encephalopathy.  Trauma:  Differential diagnosis considered but not limited to were subarachnoid hemorrhage, intracranial bleeding, pneumothorax, cardiac contusion, lung contusion, intra-abdominal bleeding, and compartment syndrome of any extremity or other significant traumatic pathology    PROCEDURES:    Labs were collected in the emergency department and all labs were reviewed and interpreted by me.  X-ray were performed in the emergency department and all X-ray impressions were independently interpreted by me.  An EKG was performed and the EKG was  interpreted by me.  CT scan was performed in the emergency department and the CT scan radiology impression was interpreted by me.    ECG 12 Lead Bradycardia   Preliminary Result   HEART RATE=49  bpm   RR Qmekkmvt=4155  ms   ND Ohmmlvki=813  ms   P Horizontal Axis=258  deg   P Front Axis=34  deg   QRSD Interval=97  ms   QT Hlgdthod=772  ms   ZJbZ=284  ms   QRS Axis=29  deg   T Wave Axis=3  deg   - ABNORMAL ECG -   Sinus bradycardia   Borderline prolonged ND interval   Low voltage, extremity leads   Nonspecific T abnormalities, anterior leads   Borderline prolonged QT interval   Date and Time of Study:2025-05-07 01:10:46          FX Dislocation    Date/Time: 5/7/2025 2:43 AM    Performed by: Rimma Murillo MD  Authorized by: Rimma Murillo MD    Consent:     Consent obtained:  Verbal    Consent given by:  Patient and spouse    Risks, benefits, and alternatives were discussed: yes      Risks discussed:  Pain, nerve damage and vascular damage    Alternatives discussed:  No treatment and delayed treatment  Universal protocol:     Procedure explained and questions answered to patient or proxy's satisfaction: yes      Imaging studies available: yes      Patient identity confirmed:  Verbally with patient  Injury:     Injury location:  Ankle    Ankle injury location:  R ankle    Ankle fracture type: trimalleolar    Pre-procedure details:     Distal neurologic exam:  Normal    Distal perfusion: distal pulses diminished      Range of motion: reduced    Sedation:     Sedation type:  None  Anesthesia:     Anesthesia method:  None  Procedure details:     Manipulation performed: yes      Reduction successful: yes      X-ray confirmed reduction: yes      Immobilization:  Splint    Splint type:  Ankle stirrup and short leg    Supplies used:  Fiberglass  Post-procedure details:     Distal neurologic exam:  Normal    Distal perfusion: distal pulses strong      Range of motion: not applicable      Procedure completion:   Tolerated well, no immediate complications      MDM  Number of Diagnoses or Management Options  Altered mental status, unspecified altered mental status type  Closed trimalleolar fracture of right ankle, initial encounter  Diagnosis management comments: Patient presented to the emergency department with altered mental status and obviously deformed ankle.  X-ray showed acute trimalleolar fracture/dislocation.  Initial exam patient was so somnolent that I was able to reduce and splint ankle without additional sedation.   She is on multiple sedating medication could be the cause of her fall.  Labs showed elevated creatinine of 1.9.  Initially patient was bradycardic and hypotensive she was given 3 L of IV fluids and atropine.  Blood pressure finally started to improve.  Discussed patient with hospitalist and will admit for further care.       Amount and/or Complexity of Data Reviewed  Clinical lab tests: reviewed  Tests in the radiology section of CPT®: reviewed  Review and summarize past medical records: yes  Independent visualization of images, tracings, or specimens: yes    Risk of Complications, Morbidity, and/or Mortality  Presenting problems: moderate  Management options: moderate             Total Critical Care time of 40 minutes. Total critical care time documented does not include time spent on separately billed procedures for services of nurses or physician assistants. I personally saw and examined the patient. I have reviewed all diagnostic interpretations and treatment plans as written. I was present for the key portions of any procedures performed and the inclusive time noted in any critical care statement. Critical care time includes patient management by me, time spent at the patients bedside,  time to review lab and imaging results, discussing patient care, documentation in the medical record, and time spent with family or caregiver.          Patient Care Considerations:    SEPSIS was considered but is  NOT present in the emergency department as SIRS criteria is not present.      Consultants/Shared Management Plan:    Hospitalist: I have discussed the case with Dr Be who agrees to accept the patient for admission.  Consultant: I have discussed the case with Dr Simmons who agrees to consult on the patient.    Social Determinants of Health:    Patient is independent, reliable, and has access to care.       Disposition and Care Coordination:    Admit:   Through independent evaluation of the patient's history, physical, and imperical data, the patient meets criteria for inpatient admission to the hospital.        Final diagnoses:   Closed trimalleolar fracture of right ankle, initial encounter   Altered mental status, unspecified altered mental status type        ED Disposition       ED Disposition   Decision to Admit    Condition   --    Comment   Level of Care: Telemetry [5]   Diagnosis: Hypotension [277985]   Admitting Physician: REGAN BE [[id number]]   Certification: I Certify That Inpatient Hospital Services Are Medically Necessary For Greater Than 2 Midnights                 This medical record created using voice recognition software.             Rimma Murillo MD  05/07/25 0241

## 2025-05-07 NOTE — PLAN OF CARE
Goal Outcome Evaluation:              Outcome Evaluation: Patient admitted to unit from ED for right ankle fracture. Patient A&Ox4, NSR on the monitor. BP stable at transfer. Patient educated on safety and admission information; verbalized understanding. Call light within reach.

## 2025-05-07 NOTE — H&P
[facility]  [facility]   Patient Name: Davidson Lyle  : 1970  MRN: [id number]  Primary Care Physician:  Madi Monson MD  Date of admission: 2025    Subjective   Subjective     Chief Complaint: Mechanical fall    HPI:    Davidson Lyle is a 54 y.o. female with past medical history of hypothyroidism and hypertension presents to the ED due to fall.  Patient states she woke up in the [place] to go to the restroom and fell forward.  Patient denies hitting her head.  When EMS arrived patient was found somnolent.  Patient takes many meds at night that can make her drowsy.  Denies chest pain, shortness of breath, abdominal pain, nausea, vomit, diarrhea or fever.  In the ED, patient's vitals show heart rate 58, temperature 97.6, blood pressure 79/43 and satting 100% room air.  Labs show troponin 10, sodium 139, creatinine 1.9, bicarb 18, TSH 26.8, white blood cell 8.6 and hemoglobin 14.5.  Chest x-ray unremarkable.  Ankle x-ray shows acute trimalleolar fracture.  Orthopedic consulted and will evaluate in the morning.  Patient admitted to floors for further management.    Personal History     Past Medical History:  Past Medical History:   Diagnosis Date    Depression     Disease of thyroid gland     Elevated cholesterol     GERD (gastroesophageal reflux disease)     Hypertension     PONV (postoperative nausea and vomiting)     PTSD (post-traumatic stress disorder)     Von Willebrand disease        Past Surgical History:  Past Surgical History:   Procedure Laterality Date    BACK SURGERY      COSMETIC SURGERY         Family History:   Family History   Problem Relation Age of Onset    Hypertension Father     Hyperlipidemia Father     Heart disease Father     Early death Father     Arthritis Paternal Grandmother        Social History:   Social History     Socioeconomic History    Marital status:    Tobacco Use    Smoking status: Some Days      Current packs/day: 0.25     Average packs/day: 0.3 packs/day for 34.0 years (8.5 ttl pk-yrs)     Types: Cigarettes   Vaping Use    Vaping status: Never Used   Substance and Sexual Activity    Alcohol use: Never    Drug use: Never    Sexual activity: Defer       Home Medications:  HYDROcodone-acetaminophen, amitriptyline, cyanocobalamin, ergocalciferol, famotidine, gabapentin, levothyroxine, lidocaine, losartan, nebivolol, and tiZANidine    Allergies:  Allergies   Allergen Reactions    Duloxetine Hcl Hallucinations    Baclofen Unknown - Low Severity    Metaxalone Unknown - Low Severity    Sulfa Antibiotics Unknown - Low Severity    Latex Rash       Review of Systems   All systems were reviewed and negative except for: Above    Objective   Objective     Vitals:   Temp:  [97.6 °F (36.4 °C)-97.8 °F (36.6 °C)] 97.6 °F (36.4 °C)  Heart Rate:  [48-58] 58  Resp:  [12] 12  BP: (60-83)/(39-48) 79/43    Physical Exam    Constitutional: Awake, alert, no acute distress   Eyes: Pupils equal, sclerae anicteric, no conjunctival injection   HENT: NCAT, mucous membranes moist   Neck: Supple, no thyromegaly, no lymphadenopathy, trachea midline   Respiratory: Clear to auscultation bilaterally, nonlabored respirations    Cardiovascular: Bradycardia, no murmurs, rubs, or gallops, palpable pedal pulses bilaterally   Gastrointestinal: Positive bowel sounds, soft, nontender, nondistended   Musculoskeletal: Right ankle dislocation   Psychiatric: Appropriate affect, cooperative   Neurologic: Oriented x 3, strength symmetric in all extremities, Cranial Nerves grossly intact to confrontation, speech clear   Skin: No rashes     Result Review    Result Review:  I have personally reviewed the results from the time of this admission to 5/7/2025 02:51 EDT and agree with these findings:  [x]  Laboratory  []  Microbiology  []  Radiology  [x]  EKG/Telemetry   []  Cardiology/Vascular   []  Pathology  [x]  Old records  []  Other:      Assessment & Plan    Assessment / Plan     Assessment/Plan:     Assessment  Hypotension; medication induced versus CAD  Right ankle fracture status post fall  Bradycardia  CKD  Hypothyroidism  History of CVA  History of CAD  GERD  Von Willebrand disease    Plan  Admit to [facility]  Telemetry  Monitor vitals  CBC BMP  Troponin trend   Right ankle x-ray reviewed  Echo ordered  CT head unremarkable  Hold home meds due to hypotension and bradycardia  Orthopedic consulted  Fluids      VTE Prophylaxis:  Mechanical VTE prophylaxis orders are present.        CODE STATUS:    Code Status (Patient has no pulse and is not breathing): CPR (Attempt to Resuscitate)  Medical Interventions (Patient has pulse or is breathing): Full Support      Admission Status:  I believe this patient meets inpt status.    Electronically signed by Bigg Burgos MD, 05/07/25, 2:51 AM EDT.

## 2025-05-07 NOTE — SIGNIFICANT NOTE
05/07/25 0902   OTHER   Discipline occupational therapist   Rehab Time/Intention   Session Not Performed other (see comments)  (Hold, pending ortho consult)

## 2025-05-07 NOTE — PLAN OF CARE
Goal Outcome Evaluation:  Plan of Care Reviewed With: patient        Progress: improving  Outcome Evaluation: Patient pleasant and compliant with care. Patient's pain treated per MAR. Patient's BP elevated, physician aware. Patient to be NPO at midnight for procedure tomorrow. Call light in reach and bed in lowest locked position. Patient has had no additional complaints so far.

## 2025-05-08 ENCOUNTER — APPOINTMENT (OUTPATIENT)
Dept: GENERAL RADIOLOGY | Facility: HOSPITAL | Age: 55
DRG: 982 | End: 2025-05-08
Payer: MEDICARE

## 2025-05-08 ENCOUNTER — ANESTHESIA (OUTPATIENT)
Dept: PERIOP | Facility: HOSPITAL | Age: 55
End: 2025-05-08
Payer: MEDICARE

## 2025-05-08 ENCOUNTER — ANESTHESIA EVENT CONVERTED (OUTPATIENT)
Dept: ANESTHESIOLOGY | Facility: HOSPITAL | Age: 55
DRG: 982 | End: 2025-05-08
Payer: MEDICARE

## 2025-05-08 LAB
ALBUMIN SERPL-MCNC: 3.9 G/DL (ref 3.5–5.2)
ANION GAP SERPL CALCULATED.3IONS-SCNC: 13 MMOL/L (ref 5–15)
BACTERIA SPEC AEROBE CULT: NO GROWTH
BASOPHILS # BLD AUTO: 0.03 10*3/MM3 (ref 0–0.2)
BASOPHILS NFR BLD AUTO: 0.4 % (ref 0–1.5)
BUN SERPL-MCNC: 14 MG/DL (ref 6–20)
BUN/CREAT SERPL: 10.3 (ref 7–25)
CALCIUM SPEC-SCNC: 8.9 MG/DL (ref 8.6–10.5)
CHLORIDE SERPL-SCNC: 102 MMOL/L (ref 98–107)
CO2 SERPL-SCNC: 22 MMOL/L (ref 22–29)
CREAT SERPL-MCNC: 1.36 MG/DL (ref 0.57–1)
DEPRECATED RDW RBC AUTO: 47.9 FL (ref 37–54)
EGFRCR SERPLBLD CKD-EPI 2021: 45.8 ML/MIN/1.73
EOSINOPHIL # BLD AUTO: 0.1 10*3/MM3 (ref 0–0.4)
EOSINOPHIL NFR BLD AUTO: 1.3 % (ref 0.3–6.2)
ERYTHROCYTE [DISTWIDTH] IN BLOOD BY AUTOMATED COUNT: 14.5 % (ref 12.3–15.4)
GLUCOSE SERPL-MCNC: 142 MG/DL (ref 65–99)
HCT VFR BLD AUTO: 38.3 % (ref 34–46.6)
HGB BLD-MCNC: 12.6 G/DL (ref 12–15.9)
IMM GRANULOCYTES # BLD AUTO: 0.02 10*3/MM3 (ref 0–0.05)
IMM GRANULOCYTES NFR BLD AUTO: 0.3 % (ref 0–0.5)
LYMPHOCYTES # BLD AUTO: 2.34 10*3/MM3 (ref 0.7–3.1)
LYMPHOCYTES NFR BLD AUTO: 29.7 % (ref 19.6–45.3)
MAGNESIUM SERPL-MCNC: 1.8 MG/DL (ref 1.6–2.6)
MCH RBC QN AUTO: 29.8 PG (ref 26.6–33)
MCHC RBC AUTO-ENTMCNC: 32.9 G/DL (ref 31.5–35.7)
MCV RBC AUTO: 90.5 FL (ref 79–97)
MONOCYTES # BLD AUTO: 0.66 10*3/MM3 (ref 0.1–0.9)
MONOCYTES NFR BLD AUTO: 8.4 % (ref 5–12)
NEUTROPHILS NFR BLD AUTO: 4.74 10*3/MM3 (ref 1.7–7)
NEUTROPHILS NFR BLD AUTO: 59.9 % (ref 42.7–76)
NRBC BLD AUTO-RTO: 0 /100 WBC (ref 0–0.2)
PHOSPHATE SERPL-MCNC: 3.4 MG/DL (ref 2.5–4.5)
PLATELET # BLD AUTO: 176 10*3/MM3 (ref 140–450)
PMV BLD AUTO: 10.2 FL (ref 6–12)
POTASSIUM SERPL-SCNC: 3.7 MMOL/L (ref 3.5–5.2)
RBC # BLD AUTO: 4.23 10*6/MM3 (ref 3.77–5.28)
SODIUM SERPL-SCNC: 137 MMOL/L (ref 136–145)
WBC NRBC COR # BLD AUTO: 7.89 10*3/MM3 (ref 3.4–10.8)

## 2025-05-08 PROCEDURE — C1713 ANCHOR/SCREW BN/BN,TIS/BN: HCPCS | Performed by: ORTHOPAEDIC SURGERY

## 2025-05-08 PROCEDURE — 80069 RENAL FUNCTION PANEL: CPT | Performed by: PHYSICIAN ASSISTANT

## 2025-05-08 PROCEDURE — 25810000003 SODIUM CHLORIDE 0.9 % SOLUTION: Performed by: ORTHOPAEDIC SURGERY

## 2025-05-08 PROCEDURE — 25010000002 CEFTRIAXONE PER 250 MG: Performed by: PHYSICIAN ASSISTANT

## 2025-05-08 PROCEDURE — 25010000002 MORPHINE PER 10 MG: Performed by: ORTHOPAEDIC SURGERY

## 2025-05-08 PROCEDURE — S0260 H&P FOR SURGERY: HCPCS | Performed by: ORTHOPAEDIC SURGERY

## 2025-05-08 PROCEDURE — 25010000002 DEXAMETHASONE PER 1 MG: Performed by: ANESTHESIOLOGY

## 2025-05-08 PROCEDURE — 25810000003 LACTATED RINGERS PER 1000 ML: Performed by: ANESTHESIOLOGY

## 2025-05-08 PROCEDURE — 0QSG04Z REPOSITION RIGHT TIBIA WITH INTERNAL FIXATION DEVICE, OPEN APPROACH: ICD-10-PCS | Performed by: ORTHOPAEDIC SURGERY

## 2025-05-08 PROCEDURE — 27814 TREATMENT OF ANKLE FRACTURE: CPT | Performed by: ORTHOPAEDIC SURGERY

## 2025-05-08 PROCEDURE — 25010000002 MIDAZOLAM PER 1MG: Performed by: ANESTHESIOLOGY

## 2025-05-08 PROCEDURE — 25010000002 FENTANYL CITRATE (PF) 50 MCG/ML SOLUTION

## 2025-05-08 PROCEDURE — 25010000002 HYDROMORPHONE 1 MG/ML SOLUTION: Performed by: STUDENT IN AN ORGANIZED HEALTH CARE EDUCATION/TRAINING PROGRAM

## 2025-05-08 PROCEDURE — 83735 ASSAY OF MAGNESIUM: CPT | Performed by: PHYSICIAN ASSISTANT

## 2025-05-08 PROCEDURE — 85025 COMPLETE CBC W/AUTO DIFF WBC: CPT | Performed by: PHYSICIAN ASSISTANT

## 2025-05-08 PROCEDURE — 25010000002 MORPHINE PER 10 MG: Performed by: STUDENT IN AN ORGANIZED HEALTH CARE EDUCATION/TRAINING PROGRAM

## 2025-05-08 PROCEDURE — 25010000002 SUGAMMADEX 200 MG/2ML SOLUTION

## 2025-05-08 PROCEDURE — 25010000002 LIDOCAINE PF 2% 2 % SOLUTION

## 2025-05-08 PROCEDURE — 25010000002 PROPOFOL 10 MG/ML EMULSION

## 2025-05-08 PROCEDURE — 25810000003 LACTATED RINGERS PER 1000 ML: Performed by: PHYSICIAN ASSISTANT

## 2025-05-08 PROCEDURE — 0QSJ04Z REPOSITION RIGHT FIBULA WITH INTERNAL FIXATION DEVICE, OPEN APPROACH: ICD-10-PCS | Performed by: ORTHOPAEDIC SURGERY

## 2025-05-08 PROCEDURE — 76000 FLUOROSCOPY <1 HR PHYS/QHP: CPT

## 2025-05-08 PROCEDURE — 94799 UNLISTED PULMONARY SVC/PX: CPT

## 2025-05-08 PROCEDURE — 25010000002 HYDROMORPHONE 1 MG/ML SOLUTION: Performed by: ANESTHESIOLOGY

## 2025-05-08 PROCEDURE — 99232 SBSQ HOSP IP/OBS MODERATE 35: CPT | Performed by: INTERNAL MEDICINE

## 2025-05-08 PROCEDURE — 27829 TREAT LOWER LEG JOINT: CPT | Performed by: ORTHOPAEDIC SURGERY

## 2025-05-08 PROCEDURE — 25010000002 CLINDAMYCIN 900 MG/50ML SOLUTION: Performed by: ORTHOPAEDIC SURGERY

## 2025-05-08 PROCEDURE — 25010000002 METOCLOPRAMIDE PER 10 MG: Performed by: ANESTHESIOLOGY

## 2025-05-08 PROCEDURE — C1769 GUIDE WIRE: HCPCS | Performed by: ORTHOPAEDIC SURGERY

## 2025-05-08 DEVICE — SCRW COMPR KREULOCK VA LK FUL/THRD TI 3X16MM: Type: IMPLANTABLE DEVICE | Site: ANKLE | Status: FUNCTIONAL

## 2025-05-08 DEVICE — SCRW COMPR KREULOCK LK FUL/THRD TI 3.5X12MM: Type: IMPLANTABLE DEVICE | Site: ANKLE | Status: FUNCTIONAL

## 2025-05-08 DEVICE — IMPLANTABLE DEVICE: Type: IMPLANTABLE DEVICE | Site: ANKLE | Status: FUNCTIONAL

## 2025-05-08 DEVICE — SCRW COMPR KREULOCK VA LK FUL/THRD TI 3X12MM: Type: IMPLANTABLE DEVICE | Site: ANKLE | Status: FUNCTIONAL

## 2025-05-08 DEVICE — SCRW COMPR KREULOCK LK FUL/THRD TI 3.5X14MM: Type: IMPLANTABLE DEVICE | Site: ANKLE | Status: FUNCTIONAL

## 2025-05-08 DEVICE — SCRW COMPR KREULOCK VA LK FUL/THRD TI 3X14MM: Type: IMPLANTABLE DEVICE | Site: ANKLE | Status: FUNCTIONAL

## 2025-05-08 RX ORDER — SCOPOLAMINE 1 MG/3D
1 PATCH, EXTENDED RELEASE TRANSDERMAL ONCE
Status: COMPLETED | OUTPATIENT
Start: 2025-05-08 | End: 2025-05-11

## 2025-05-08 RX ORDER — DEXAMETHASONE SODIUM PHOSPHATE 4 MG/ML
INJECTION, SOLUTION INTRA-ARTICULAR; INTRALESIONAL; INTRAMUSCULAR; INTRAVENOUS; SOFT TISSUE
Status: COMPLETED
Start: 2025-05-08 | End: 2025-05-08

## 2025-05-08 RX ORDER — PROMETHAZINE HYDROCHLORIDE 12.5 MG/1
25 TABLET ORAL ONCE AS NEEDED
Status: DISCONTINUED | OUTPATIENT
Start: 2025-05-08 | End: 2025-05-08 | Stop reason: HOSPADM

## 2025-05-08 RX ORDER — MORPHINE SULFATE 2 MG/ML
1 INJECTION, SOLUTION INTRAMUSCULAR; INTRAVENOUS EVERY 4 HOURS PRN
Status: DISCONTINUED | OUTPATIENT
Start: 2025-05-08 | End: 2025-05-12 | Stop reason: HOSPADM

## 2025-05-08 RX ORDER — NALOXONE HCL 0.4 MG/ML
0.4 VIAL (ML) INJECTION
Status: DISCONTINUED | OUTPATIENT
Start: 2025-05-08 | End: 2025-05-12 | Stop reason: HOSPADM

## 2025-05-08 RX ORDER — LIDOCAINE HYDROCHLORIDE 20 MG/ML
INJECTION, SOLUTION EPIDURAL; INFILTRATION; INTRACAUDAL; PERINEURAL AS NEEDED
Status: DISCONTINUED | OUTPATIENT
Start: 2025-05-08 | End: 2025-05-08 | Stop reason: SURG

## 2025-05-08 RX ORDER — BISACODYL 5 MG/1
5 TABLET, DELAYED RELEASE ORAL DAILY PRN
Status: DISCONTINUED | OUTPATIENT
Start: 2025-05-08 | End: 2025-05-12 | Stop reason: HOSPADM

## 2025-05-08 RX ORDER — SODIUM CHLORIDE 9 MG/ML
100 INJECTION, SOLUTION INTRAVENOUS CONTINUOUS
Status: ACTIVE | OUTPATIENT
Start: 2025-05-08 | End: 2025-05-08

## 2025-05-08 RX ORDER — METOCLOPRAMIDE HYDROCHLORIDE 5 MG/ML
10 INJECTION INTRAMUSCULAR; INTRAVENOUS ONCE
Status: COMPLETED | OUTPATIENT
Start: 2025-05-08 | End: 2025-05-08

## 2025-05-08 RX ORDER — PROMETHAZINE HYDROCHLORIDE 25 MG/1
25 SUPPOSITORY RECTAL ONCE AS NEEDED
Status: DISCONTINUED | OUTPATIENT
Start: 2025-05-08 | End: 2025-05-08 | Stop reason: HOSPADM

## 2025-05-08 RX ORDER — BUPIVACAINE HYDROCHLORIDE AND EPINEPHRINE 5; 5 MG/ML; UG/ML
INJECTION, SOLUTION EPIDURAL; INTRACAUDAL; PERINEURAL
Status: COMPLETED | OUTPATIENT
Start: 2025-05-08 | End: 2025-05-08

## 2025-05-08 RX ORDER — SODIUM CHLORIDE 0.9 % (FLUSH) 0.9 %
10 SYRINGE (ML) INJECTION EVERY 12 HOURS SCHEDULED
Status: DISCONTINUED | OUTPATIENT
Start: 2025-05-08 | End: 2025-05-12 | Stop reason: HOSPADM

## 2025-05-08 RX ORDER — SODIUM CHLORIDE 0.9 % (FLUSH) 0.9 %
10 SYRINGE (ML) INJECTION AS NEEDED
Status: DISCONTINUED | OUTPATIENT
Start: 2025-05-08 | End: 2025-05-12 | Stop reason: HOSPADM

## 2025-05-08 RX ORDER — FENTANYL CITRATE 50 UG/ML
INJECTION, SOLUTION INTRAMUSCULAR; INTRAVENOUS AS NEEDED
Status: DISCONTINUED | OUTPATIENT
Start: 2025-05-08 | End: 2025-05-08 | Stop reason: SURG

## 2025-05-08 RX ORDER — BISACODYL 10 MG
10 SUPPOSITORY, RECTAL RECTAL DAILY PRN
Status: DISCONTINUED | OUTPATIENT
Start: 2025-05-08 | End: 2025-05-12 | Stop reason: HOSPADM

## 2025-05-08 RX ORDER — DEXAMETHASONE SODIUM PHOSPHATE 4 MG/ML
INJECTION, SOLUTION INTRA-ARTICULAR; INTRALESIONAL; INTRAMUSCULAR; INTRAVENOUS; SOFT TISSUE
Status: COMPLETED | OUTPATIENT
Start: 2025-05-08 | End: 2025-05-08

## 2025-05-08 RX ORDER — OXYCODONE HYDROCHLORIDE 5 MG/1
5 TABLET ORAL
Refills: 0 | Status: DISCONTINUED | OUTPATIENT
Start: 2025-05-08 | End: 2025-05-08 | Stop reason: HOSPADM

## 2025-05-08 RX ORDER — DEXMEDETOMIDINE HYDROCHLORIDE 100 UG/ML
INJECTION, SOLUTION INTRAVENOUS AS NEEDED
Status: DISCONTINUED | OUTPATIENT
Start: 2025-05-08 | End: 2025-05-08 | Stop reason: SURG

## 2025-05-08 RX ORDER — MIDAZOLAM HYDROCHLORIDE 2 MG/2ML
2 INJECTION, SOLUTION INTRAMUSCULAR; INTRAVENOUS ONCE
Status: COMPLETED | OUTPATIENT
Start: 2025-05-08 | End: 2025-05-08

## 2025-05-08 RX ORDER — PETROLATUM,WHITE
OINTMENT IN PACKET (GRAM) TOPICAL AS NEEDED
Status: DISCONTINUED | OUTPATIENT
Start: 2025-05-08 | End: 2025-05-08 | Stop reason: SURG

## 2025-05-08 RX ORDER — ROCURONIUM BROMIDE 10 MG/ML
INJECTION, SOLUTION INTRAVENOUS AS NEEDED
Status: DISCONTINUED | OUTPATIENT
Start: 2025-05-08 | End: 2025-05-08 | Stop reason: SURG

## 2025-05-08 RX ORDER — SODIUM CHLORIDE, SODIUM LACTATE, POTASSIUM CHLORIDE, CALCIUM CHLORIDE 600; 310; 30; 20 MG/100ML; MG/100ML; MG/100ML; MG/100ML
9 INJECTION, SOLUTION INTRAVENOUS CONTINUOUS PRN
Status: DISCONTINUED | OUTPATIENT
Start: 2025-05-08 | End: 2025-05-08 | Stop reason: HOSPADM

## 2025-05-08 RX ORDER — SODIUM CHLORIDE 9 MG/ML
40 INJECTION, SOLUTION INTRAVENOUS AS NEEDED
Status: DISCONTINUED | OUTPATIENT
Start: 2025-05-08 | End: 2025-05-12 | Stop reason: HOSPADM

## 2025-05-08 RX ORDER — AMOXICILLIN 250 MG
2 CAPSULE ORAL 2 TIMES DAILY
Status: DISCONTINUED | OUTPATIENT
Start: 2025-05-08 | End: 2025-05-12 | Stop reason: HOSPADM

## 2025-05-08 RX ORDER — ONDANSETRON 4 MG/1
4 TABLET, ORALLY DISINTEGRATING ORAL EVERY 6 HOURS PRN
Status: DISCONTINUED | OUTPATIENT
Start: 2025-05-08 | End: 2025-05-12 | Stop reason: HOSPADM

## 2025-05-08 RX ORDER — CLINDAMYCIN PHOSPHATE 900 MG/50ML
900 INJECTION, SOLUTION INTRAVENOUS ONCE
Status: COMPLETED | OUTPATIENT
Start: 2025-05-08 | End: 2025-05-08

## 2025-05-08 RX ORDER — BUPIVACAINE HYDROCHLORIDE AND EPINEPHRINE 5; 5 MG/ML; UG/ML
INJECTION, SOLUTION EPIDURAL; INTRACAUDAL; PERINEURAL
Status: COMPLETED
Start: 2025-05-08 | End: 2025-05-08

## 2025-05-08 RX ORDER — HYDRALAZINE HYDROCHLORIDE 25 MG/1
25 TABLET, FILM COATED ORAL EVERY 12 HOURS SCHEDULED
Status: DISCONTINUED | OUTPATIENT
Start: 2025-05-08 | End: 2025-05-12 | Stop reason: HOSPADM

## 2025-05-08 RX ORDER — BUPIVACAINE HCL/0.9 % NACL/PF 0.125 %
PLASTIC BAG, INJECTION (ML) EPIDURAL AS NEEDED
Status: DISCONTINUED | OUTPATIENT
Start: 2025-05-08 | End: 2025-05-08 | Stop reason: SURG

## 2025-05-08 RX ORDER — PROPOFOL 10 MG/ML
VIAL (ML) INTRAVENOUS AS NEEDED
Status: DISCONTINUED | OUTPATIENT
Start: 2025-05-08 | End: 2025-05-08 | Stop reason: SURG

## 2025-05-08 RX ORDER — ONDANSETRON 2 MG/ML
4 INJECTION INTRAMUSCULAR; INTRAVENOUS ONCE AS NEEDED
Status: DISCONTINUED | OUTPATIENT
Start: 2025-05-08 | End: 2025-05-08 | Stop reason: HOSPADM

## 2025-05-08 RX ORDER — ONDANSETRON 2 MG/ML
4 INJECTION INTRAMUSCULAR; INTRAVENOUS EVERY 6 HOURS PRN
Status: DISCONTINUED | OUTPATIENT
Start: 2025-05-08 | End: 2025-05-12 | Stop reason: HOSPADM

## 2025-05-08 RX ORDER — DEXAMETHASONE SODIUM PHOSPHATE 4 MG/ML
INJECTION, SOLUTION INTRA-ARTICULAR; INTRALESIONAL; INTRAMUSCULAR; INTRAVENOUS; SOFT TISSUE AS NEEDED
Status: DISCONTINUED | OUTPATIENT
Start: 2025-05-08 | End: 2025-05-08 | Stop reason: SURG

## 2025-05-08 RX ORDER — POLYETHYLENE GLYCOL 3350 17 G/17G
17 POWDER, FOR SOLUTION ORAL DAILY PRN
Status: DISCONTINUED | OUTPATIENT
Start: 2025-05-08 | End: 2025-05-12 | Stop reason: HOSPADM

## 2025-05-08 RX ORDER — CITRIC ACID/SODIUM CITRATE 334-500MG
30 SOLUTION, ORAL ORAL ONCE
Status: COMPLETED | OUTPATIENT
Start: 2025-05-08 | End: 2025-05-08

## 2025-05-08 RX ORDER — ACETAMINOPHEN 500 MG
1000 TABLET ORAL ONCE
Status: COMPLETED | OUTPATIENT
Start: 2025-05-08 | End: 2025-05-08

## 2025-05-08 RX ADMIN — BUPIVACAINE HYDROCHLORIDE AND EPINEPHRINE BITARTRATE 40 ML: 5; .005 INJECTION, SOLUTION EPIDURAL; INTRACAUDAL; PERINEURAL at 10:51

## 2025-05-08 RX ADMIN — SODIUM CITRATE AND CITRIC ACID MONOHYDRATE 30 ML: 334; 500 SOLUTION ORAL at 10:06

## 2025-05-08 RX ADMIN — PROPOFOL 150 MG: 10 INJECTION, EMULSION INTRAVENOUS at 12:52

## 2025-05-08 RX ADMIN — ROCURONIUM BROMIDE 50 MG: 10 INJECTION, SOLUTION INTRAVENOUS at 12:52

## 2025-05-08 RX ADMIN — HYDROMORPHONE HYDROCHLORIDE 0.5 MG: 1 INJECTION, SOLUTION INTRAMUSCULAR; INTRAVENOUS; SUBCUTANEOUS at 10:04

## 2025-05-08 RX ADMIN — DEXAMETHASONE SODIUM PHOSPHATE 4 MG: 4 INJECTION, SOLUTION INTRA-ARTICULAR; INTRALESIONAL; INTRAMUSCULAR; INTRAVENOUS; SOFT TISSUE at 13:00

## 2025-05-08 RX ADMIN — DEXAMETHASONE SODIUM PHOSPHATE 4 MG: 4 INJECTION, SOLUTION INTRAMUSCULAR; INTRAVENOUS at 10:51

## 2025-05-08 RX ADMIN — SUGAMMADEX 200 MG: 100 INJECTION, SOLUTION INTRAVENOUS at 14:25

## 2025-05-08 RX ADMIN — OXYCODONE 10 MG: 5 TABLET ORAL at 16:21

## 2025-05-08 RX ADMIN — DEXMEDETOMIDINE 10 MCG: 100 INJECTION, SOLUTION, CONCENTRATE INTRAVENOUS at 13:27

## 2025-05-08 RX ADMIN — HYDROMORPHONE HYDROCHLORIDE 0.5 MG: 1 INJECTION, SOLUTION INTRAMUSCULAR; INTRAVENOUS; SUBCUTANEOUS at 02:02

## 2025-05-08 RX ADMIN — Medication 10 ML: at 08:31

## 2025-05-08 RX ADMIN — METOCLOPRAMIDE 10 MG: 5 INJECTION, SOLUTION INTRAMUSCULAR; INTRAVENOUS at 10:06

## 2025-05-08 RX ADMIN — Medication 10 ML: at 21:47

## 2025-05-08 RX ADMIN — Medication 100 MCG: at 13:11

## 2025-05-08 RX ADMIN — ACETAMINOPHEN 1000 MG: 500 TABLET ORAL at 10:07

## 2025-05-08 RX ADMIN — CLINDAMYCIN PHOSPHATE 900 MG: 900 INJECTION, SOLUTION INTRAVENOUS at 13:00

## 2025-05-08 RX ADMIN — Medication 1 PACKAGE: at 13:01

## 2025-05-08 RX ADMIN — MORPHINE SULFATE 2 MG: 2 INJECTION, SOLUTION INTRAMUSCULAR; INTRAVENOUS at 08:26

## 2025-05-08 RX ADMIN — OXYCODONE 10 MG: 5 TABLET ORAL at 05:45

## 2025-05-08 RX ADMIN — HYDRALAZINE HYDROCHLORIDE 25 MG: 25 TABLET ORAL at 21:46

## 2025-05-08 RX ADMIN — LEVOTHYROXINE SODIUM 25 MCG: 0.03 TABLET ORAL at 05:45

## 2025-05-08 RX ADMIN — FENTANYL CITRATE 50 MCG: 50 INJECTION, SOLUTION INTRAMUSCULAR; INTRAVENOUS at 12:52

## 2025-05-08 RX ADMIN — SODIUM CHLORIDE, POTASSIUM CHLORIDE, SODIUM LACTATE AND CALCIUM CHLORIDE 50 ML/HR: 600; 310; 30; 20 INJECTION, SOLUTION INTRAVENOUS at 04:51

## 2025-05-08 RX ADMIN — MORPHINE SULFATE 1 MG: 2 INJECTION, SOLUTION INTRAMUSCULAR; INTRAVENOUS at 23:44

## 2025-05-08 RX ADMIN — OXYCODONE 10 MG: 5 TABLET ORAL at 21:46

## 2025-05-08 RX ADMIN — MORPHINE SULFATE 2 MG: 2 INJECTION, SOLUTION INTRAMUSCULAR; INTRAVENOUS at 03:47

## 2025-05-08 RX ADMIN — CEFTRIAXONE SODIUM 2000 MG: 2 INJECTION, POWDER, FOR SOLUTION INTRAMUSCULAR; INTRAVENOUS at 10:29

## 2025-05-08 RX ADMIN — SODIUM CHLORIDE, POTASSIUM CHLORIDE, SODIUM LACTATE AND CALCIUM CHLORIDE: 600; 310; 30; 20 INJECTION, SOLUTION INTRAVENOUS at 13:27

## 2025-05-08 RX ADMIN — SODIUM CHLORIDE, POTASSIUM CHLORIDE, SODIUM LACTATE AND CALCIUM CHLORIDE: 600; 310; 30; 20 INJECTION, SOLUTION INTRAVENOUS at 12:46

## 2025-05-08 RX ADMIN — SCOLOPAMINE TRANSDERMAL SYSTEM 1 PATCH: 1 PATCH, EXTENDED RELEASE TRANSDERMAL at 10:06

## 2025-05-08 RX ADMIN — FENTANYL CITRATE 50 MCG: 50 INJECTION, SOLUTION INTRAMUSCULAR; INTRAVENOUS at 13:17

## 2025-05-08 RX ADMIN — MIDAZOLAM HYDROCHLORIDE 2 MG: 1 INJECTION, SOLUTION INTRAMUSCULAR; INTRAVENOUS at 10:44

## 2025-05-08 RX ADMIN — HYDROMORPHONE HYDROCHLORIDE 0.5 MG: 1 INJECTION, SOLUTION INTRAMUSCULAR; INTRAVENOUS; SUBCUTANEOUS at 10:37

## 2025-05-08 RX ADMIN — LIDOCAINE HYDROCHLORIDE 80 MG: 20 INJECTION, SOLUTION INTRAVENOUS at 12:52

## 2025-05-08 RX ADMIN — HYDRALAZINE HYDROCHLORIDE 10 MG: 10 TABLET ORAL at 08:28

## 2025-05-08 RX ADMIN — OXYCODONE 10 MG: 5 TABLET ORAL at 00:13

## 2025-05-08 RX ADMIN — DEXMEDETOMIDINE 10 MCG: 100 INJECTION, SOLUTION, CONCENTRATE INTRAVENOUS at 14:14

## 2025-05-08 RX ADMIN — Medication 10 ML: at 21:48

## 2025-05-08 RX ADMIN — SODIUM CHLORIDE 100 ML/HR: 9 INJECTION, SOLUTION INTRAVENOUS at 17:24

## 2025-05-08 RX ADMIN — PROPOFOL 150 MCG/KG/MIN: 10 INJECTION, EMULSION INTRAVENOUS at 12:59

## 2025-05-08 RX ADMIN — MORPHINE SULFATE 1 MG: 2 INJECTION, SOLUTION INTRAMUSCULAR; INTRAVENOUS at 19:29

## 2025-05-08 NOTE — DISCHARGE INSTRUCTIONS
DISCHARGE INSTRUCTIONS  ORTHOPEDICS      For your surgery you had:  General anesthesia (you may have a sore throat for the first 24 hours)  IV sedation.  Local anesthesia  Monitored anesthesia care    You may experience dizziness, drowsiness, or light-headedness for several hours following surgery  Do not stay alone today or tonight.  Limit your activity for 24 hours.  Resume your diet slowly.  Follow whatever special dietary instructions you may have been given by the doctor.  You should not drive or operate machinery or drink alcohol for 24 hours or while you are taking pain medication.  You should not sign any legally binding documents.  If you had an axillary or regional block, you will not have control of the involved limb for up to 12 hours.  Protect the arm with a sling or follow your physician's specific instructions.  You may remove dressing:  [] in 24 hours  [] in 48 hours  [x] Other: donot remove dressing    You may shower   Sleep with the injured part elevated on a pillow.  Medications per physician's instructions as indicated on Discharge Medication Information Sheet.  Follow verbal instructions of your doctor.  Carry the upper arm in a sling so that the hand and wrist are above the level of the heart.  Sit with the lower leg propped up on a footstool or chair with pillows.  Exercise fingers or toes for 10 minutes every hour while awake. Ice bag to injured area for 72 hours.  Apply 20 minutes on - 20 minutes off.  Never place ice directly on skin or cast.    Avoid getting cast or dressing wet.    In addition to these instructions, follow the discharge instructions on postoperative arthroscopic surgery.  SPECIAL INSTRUCTIONS:           Last dose of pain medication was given at:    NOTIFY THE PHYSICIAN IF YOU EXPERIENCE:  Numbness of fingers or toes.  Inability to move fingers or toes.  Extreme coldness, paleness or blue dis-coloration of fingers or toes.  Excessive swelling of affected surgical site or  swelling that causes the cast to rub or cut into skin.  Pain unrelieved by pain medication  Nausea/vomiting not relieved by prescribed medication  Unable to urinate in 6 hours after surgery  Temperature greater than 101 degree Fahrenheit or chills  If unable to reach your doctor, please go to the closest emergency room  You should see Dr. Simmons  for follow-up care   on   .   Phone number: [phone]

## 2025-05-08 NOTE — PLAN OF CARE
Goal Outcome Evaluation:  Plan of Care Reviewed With: patient        Progress: improving  Outcome Evaluation: Patient pleasant and compliant with care. Patient had right ankle open reduction internal fixation done today. Surgical dressing and splint in place to right ankle. Patient's BP elevated, physician aware. Patient's pain treated per MAR. Patient alert and able to make needs known. Call light in reach and bed in lowest locked position. Patient has had no additional complaints so far.

## 2025-05-08 NOTE — OP NOTE
ANKLE OPEN REDUCTION INTERNAL FIXATION  Procedure Report    Patient Name:  Davidson Lyle  YOB: 1970    Date of Surgery:  5/8/2025     Indications: See H&P    Pre-op Diagnosis:   Closed fracture of right ankle, initial encounter [S82.891A]       Post-Op Diagnosis Codes:     * Closed fracture of right ankle, initial encounter [S82.891A]    Procedure:  Procedure(s):  Open reduction internal fixation of a right distal fibula fracture  Open reduction internal fixation of a medial malleolus fracture  Open reduction internal fixation of a syndesmotic disruption.      Staff:  Surgeon(s):  Zack Simmons MD    Assistant: Guy Rodriguez    Anesthesia: General with Block    Estimated Blood Loss: 10 mL    Implants:    Implant Name Type Inv. Item Serial No.  Lot No. LRB No. Used Action   GW TROC TP 1.30C098AS - [id number] Implant GW TROC TP 1.75G651DV  [employer] [id number] Right 2 Implanted   SCRW ODALIS QUICKFIX CANC PT SHT/THRD 4X38MM - [id number] Implant SCRW ODALIS QUICKFIX CANC PT SHT/THRD 4X38MM  [employer] [id number] Right 2 Implanted   SCRW COMPR KREULOCK VA LK FUL/THRD TI 3X12MM - [id number] Implant SCRW COMPR KREULOCK VA LK FUL/THRD TI 3X12MM  [employer] [id number] Right 2 Implanted   SCRW COMPR KREULOCK VA LK FUL/THRD TI 3X14MM - [id number] Implant SCRW COMPR KREULOCK VA LK FUL/THRD TI 3X14MM  [employer] [id number] Right 2 Implanted   SCRW COMPR KREULOCK VA LK FUL/THRD TI 3X16MM - [id number] Implant SCRW COMPR KREULOCK VA LK FUL/THRD TI 3X16MM  [employer] [id number] Right 1 Implanted   SCRW LP NL TI 3.5X14MM - [id number] Implant SCRW LP NL TI 3.5X14MM  [employer] [id number] Right 2 Implanted   SCRW QUINCY L/P TI 3.5X38MM - [id number] Implant SCRW QUINCY L/P TI 3.5X38MM  [employer] [id number] Right 1 Implanted   SCRW LP FT/ANKL TI 3.5X44MM - [id number] Implant SCRW LP FT/ANKL TI 3.5X44MM  [employer] [id number] Right 1 Implanted   SCRW LP NL TI 3.5X22MM - [id number] Implant SCRW LP  NL TI 3.5X22MM  [employer] [id number] Right 1 Implanted       Specimen:          None        Findings: See description    Complications: None    Description of Procedure: Patient taken operating room placed supine operating table after general anesthesia established the right lower extremity was prepped and draped in standard fashion using Betadine the patient was noted to have some fracture blistering however none was in the incision line.  Lateral incision was then made approximate centimeters in length dissection was carried down the fracture site of the fracture site was repaired using knife curette and irrigation and eventually a lag screw was placed from anterior to posterior for the supination external rotation type fracture pattern excellent purchase was achieved on that screw.  An Arthrex posterolateral plate was fashioned on the distal fibula appropriate placement then was locked distally first with screws and compressed proximally with cortical screw and locking screws and cortical screws were filled as needed.  Medial incision was then made dissection was carried down to the medial malleolus fracture which was significantly displaced.  The patient has syndesmotic disruption is difficult reduction and there is joint space widening.  The joint was inspected there is no loose bodies and eventually Damien tongs was used to assist in the reduction of the medial malleolus fragment and after temporary reduction was achieved with a bony tenaculum 2 guidepins were placed across the fracture site and after verifying good position 4 oh cannulated screws were placed over the guidewires and tightened down.  The guidewires were then removed and then while holding the syndesmosis reduced and the ankle dorsiflexed maximally 2 syndesmotic screws were placed at 30 degree posterior to anterior angle into the tibia.  Good purchase achieved with no screws the Damien tongs were removed and C-arm shots revealed anatomic alignment  of the joint and no significant widening of the medial clear space on all views the wounds were copiously irrigated and closed with Vicryl and staples incisions were both washed and dried and sterile dressings were applied including a posterior Ortho-Glass splint with stirrups and Ace wrap the patient tolerated the procedure well was extubated taken recovery room.            Zack Simmons MD     Date: 5/8/2025  Time: 16:20 EDT

## 2025-05-08 NOTE — ANESTHESIA PREPROCEDURE EVALUATION
Anesthesia Evaluation     Patient summary reviewed and Nursing notes reviewed   history of anesthetic complications:  PONV  NPO Solid Status: > 8 hours  NPO Liquid Status: > 2 hours           Airway   Mallampati: II  TM distance: >3 FB  Neck ROM: full  No difficulty expected  Dental - normal exam     Pulmonary - normal exam    breath sounds clear to auscultation  (+) a smoker Current, cigarettes,  Cardiovascular - normal exam  Exercise tolerance: good (4-7 METS)    PT is on anticoagulation therapy  Rhythm: regular  Rate: normal    (+) hypertension, hyperlipidemia      Neuro/Psych  (+) TIA (Plavix (LD: >7 days ago)), psychiatric history Depression and PTSD  GI/Hepatic/Renal/Endo    (+) obesity, GERD (reglan and bicitra ordered in preop) poorly controlled, renal disease-, thyroid problem hypothyroidism    Musculoskeletal (-) negative ROS    Abdominal   (+) obese   Substance History - negative use     OB/GYN negative ob/gyn ROS         Other - negative ROS       ROS/Med Hx Other: PAT Nursing Notes unavailable.                 Anesthesia Plan    ASA 3     general and regional     (Patient understands anesthesia not responsible for dental damage. Regional anesthesia options discussed with patient. Pt accepts regional block.)  intravenous induction     Anesthetic plan, risks, benefits, and alternatives have been provided, discussed and informed consent has been obtained with: patient and sibling.    Use of blood products discussed with patient .    Plan discussed with CRNA.      CODE STATUS:    Code Status (Patient has no pulse and is not breathing): CPR (Attempt to Resuscitate)  Medical Interventions (Patient has pulse or is breathing): Full Support

## 2025-05-08 NOTE — ANESTHESIA PROCEDURE NOTES
Peripheral Block    Pre-sedation assessment completed: 5/8/2025 10:10 AM    Patient reassessed immediately prior to procedure    Patient location during procedure: pre-op  Start time: 5/8/2025 10:45 AM  Stop time: 5/8/2025 10:51 AM  Reason for block: at surgeon's request and post-op pain management  Preanesthetic Checklist  Completed: patient identified, IV checked, site marked, risks and benefits discussed, surgical consent, monitors and equipment checked, pre-op evaluation and timeout performed  Prep:  Pt Position: supine  Sterile barriers:alcohol skin prep, cap, gloves, mask, partial drape and washed/disinfected hands  Prep: ChloraPrep  Patient monitoring: blood pressure monitoring, continuous pulse oximetry and EKG  Procedure    Sedation: yes  Performed under: local infiltration  Guidance:ultrasound guided and nerve stimulator    ULTRASOUND INTERPRETATION.  Using ultrasound guidance a 20 G gauge needle was placed in close proximity to the nerve, at which point, under ultrasound guidance anesthetic was injected in the area of the nerve and spread of the anesthesia was seen on ultrasound in close proximity thereto.  There were no abnormalities seen on ultrasound; a digital image was taken; and the patient tolerated the procedure with no complications. Images:still images obtained, printed/placed on chart    Laterality:right  Block Type:popliteal and adductor canal block  Injection Technique:single-shot  Needle Type:echogenic  Needle Gauge:20 G (4in)  Resistance on Injection: none    Medications Used: dexamethasone (DECADRON) injection 4 mg/mL - Injection   4 mg - 5/8/2025 10:51:00 AM  bupivacaine-EPINEPHrine PF (MARCAINE w/EPI) 0.5% -[id number] injection - Injection   40 mL - 5/8/2025 10:51:00 AM      Post Assessment  Injection Assessment: negative aspiration for heme, no paresthesia on injection and incremental injection  Patient Tolerance:comfortable throughout block  Complications:no  Additional Notes  The block  or continuous infusion is requested by the referring physician for management of postoperative pain, or pain related to a procedure. Ultrasound guidance (deemed medically necessary). Painless injection, pt was awake and conversant during the procedure without complications. Needle and surrounding structures visualized throughout procedure. No adverse reactions or complications seen during this period. Post-procedure image showed no signs of complication, and anatomy was consistent with an uncomplicated nerve blockade.  Performed by: Charlotte Powell MD

## 2025-05-08 NOTE — SIGNIFICANT NOTE
05/08/25 0700   Physical Therapy Time and Intention   Session Not Performed other (see comments)  (Hold, pending ankle ORIF)

## 2025-05-08 NOTE — H&P
[facility]   Consult Note     Patient Name: Davidson Lyle  : 1970  MRN: [id number]  Primary Care Physician:  Madi Monson MD  Referring Physician: No ref. provider found  Date of admission: 2025     Subjective  Subjective      Reason for Consult/ Chief Complaint: Right ankle fracture     HPI:  Davidson Lyle is a 54 y.o. female history of hypothyroidism and hypertension as presented to the emergency room after a fall she fell forward as she was going the bathroom and fractured her right ankle reduction was done by the emergency room I was consulted and she was admitted for medical reasons the hospitalist are seeing and evaluating     Review of Systems              14 Point ROS is negative except as noted above.      Personal History      Medical History        Past Medical History:   Diagnosis Date    Depression      Disease of thyroid gland      Elevated cholesterol      GERD (gastroesophageal reflux disease)      Hypertension      PONV (postoperative nausea and vomiting)      PTSD (post-traumatic stress disorder)      Von Willebrand disease              Surgical History         Past Surgical History:   Procedure Laterality Date    BACK SURGERY        COSMETIC SURGERY                Family History: family history includes Arthritis in her paternal grandmother; Early death in her father; Heart disease in her father; Hyperlipidemia in her father; Hypertension in her father. Otherwise pertinent FHx was reviewed and not pertinent to current issue.     Social History:  reports that she has been smoking cigarettes. She has a 8.5 pack-year smoking history. She does not have any smokeless tobacco history on file. She reports that she does not drink alcohol and does not use drugs.     Home Medications:  HYDROcodone-acetaminophen, amitriptyline, cyanocobalamin, ergocalciferol, famotidine, gabapentin, levothyroxine, lidocaine, losartan, nebivolol, and tiZANidine     Allergies:  Allergies         Allergies   Allergen Reactions    Duloxetine Hcl Hallucinations    Baclofen Unknown - Low Severity    Metaxalone Unknown - Low Severity    Sulfa Antibiotics Unknown - Low Severity    Latex Rash            Objective  Objective      Vitals:   Temp:  [97.5 °F (36.4 °C)-98.2 °F (36.8 °C)] 98.2 °F (36.8 °C)  Heart Rate:  [48-74] 74  Resp:  [12-20] 20  BP: ()/(39-99) 186/99     Physical Exam:              Constitutional: Awake, alert              HENT: Atraumatic, Normocephalic              Respiratory: Nonlabored respirations               Cardiovascular: Intact peripheral pulses               Musculoskeletal: Clavicles shoulders elbows wrist and hand are nontender bilateral knees are nontender left ankle is nontender she is in the right ankle splint brisk cap refill to her toes sensations grossly intact x-rays reveal a soup phonation external Tatian fracture pattern bimalleolar tract fracture                Imaging:  Imaging Results (Last 24 Hours)         Procedure Component Value Units Date/Time     CT Head Without Contrast [[id number]] Collected: 05/07/25 0155       Updated: 05/07/25 0201     Narrative:       CT HEAD WO CONTRAST     Date of exam: 5/7/2025 1:39 AM EDT.     Indications: ams (altered mental status); ha (headache)     Comparison: 1/29/2023.     TECHNIQUE:  Axial CT images were obtained of the head without contrast administration. Reconstructed 2D coronal and sagittal images were also obtained. Automated exposure control and iterative construction methods were used. Additionally, the radiation dose   reduction device was turned on for each scan per the ALARA (As Low as Reasonably Achievable) protocol. Please see the electronic medical record (EMR) for the recorded radiation dose.     FINDINGS:  A routine nonenhanced head CT was performed. No acute brain abnormality is seen. No acute infarct. No acute intracranial hemorrhage. No midline shift or acute intracranial mass effect is seen. The  ventricular system and the extra-axial spaces are   minimally prominent. There is suspected mild chronic small vessel ischemic disease. Arterial and basal ganglia calcifications are seen. No acute skull fracture is identified. Minimal age-indeterminate mucosal thickening and/or retained secretions   involve(s) the imaged paranasal sinuses. No air-fluid interfaces are seen within the imaged paranasal sinuses. No significant acute findings are seen with regard to the imaged orbits or middle ear clefts.           Impression:       No acute brain abnormality is appreciated. No acute infarct. No acute intracranial hemorrhage. No acute skull fracture.           Portions of this note were completed with a voice recognition program.     Electronically Signed: Jarred Farley MD    5/7/2025 1:59 AM EDT    Workstation ID: [id number]     XR Ankle 2 View Right [[id number]] Collected: 05/07/25 0141       Updated: 05/07/25 0144     Narrative:       XR ANKLE 2 VW RIGHT-     Date of exam: 5/7/2025, 12:55 A.M.     Comparison: 5/7/2025, 12:41 a.m.     INDICATIONS:  54-year-old female for imaging follow-up after closed reduction of an  acute right ankle fracture-dislocation.     FINDINGS:  Two (2) nonweightbearing views were obtained. There has been interval  closed reduction of the acute right ankle fracture-dislocation with  interval improvement in alignment. A cast is in place obscuring detail  if symptoms or clinical concerns persist, consider imaging follow-up.           Impression:       Interval improvement in alignment is noted after closed reduction of the  right ankle-fracture dislocation.        Portions of this note were completed with a voice recognition program.     5/7/2025 1:42 AM by Jarred Farley MD on Workstation: [id number]        XR Chest 1 View [[id number]] Collected: 05/07/25 0137       Updated: 05/07/25 0143     Narrative:       XR CHEST 1 VW-     Date of exam: 5/7/2025 12:50 AM.     Comparison: 1/29/2023.      INDICATIONS:  54-year-old female with history of cough (persistent); SOA/SOB/shortness  of air/shortness of breath; h/o right ankle acute fracture-dislocation.     FINDINGS:  A single AP (or PA) upright portable chest radiograph was performed. No  cardiac enlargement is seen. No acute infiltrate is appreciated. No  pleural effusion or pneumothorax is identified. There are postoperative  changes of the cervical spine. External artifacts are noted. There is  minimal lateral curvature of the cervicothoracic spine with the  convexity to the left, which may be fixed or positional in nature. There  is interval improvement in lung expansion since 1/29/2023. Otherwise, no  significant interval change is seen since the prior study (or studies).           Impression:       No acute infiltrate is appreciated. No cardiac enlargement. No  pneumothorax.           Portions of this note were completed with a voice recognition program.     5/7/2025 1:41 AM by aJrred Farley MD on Workstation: [email or web address]        XR Ankle 3+ View Right [[id number]] Collected: 05/07/25 0104       Updated: 05/07/25 0113     Narrative:       XR ANKLE 3+ VW RIGHT-     Date of exam: 5/7/2025 12:43 AM.     Comparison: 4/16/2016.     INDICATIONS:  54-year-old female who fell w/ right ankle pain & deformity;  trauma/injury; initial encounter.     FINDINGS:  Three (3) nonweightbearing views of the right ankle were obtained. The  study is ABNORMAL. There is an acute trimalleolar (Gould type B)  fracture-dislocation involving the right ankle. That is, there is  lateral or valgus displacement of the right talus and medial malleolus  (of the right tibia) relative to the more proximal right tibia,  estimated at 1.2 cm. The displays medial malleolar fracture is  intra-articular. There is an obliquely oriented intra-articular fracture  of the distal right fibula; it is slightly distracted, estimated at 4  mm. A nondisplaced comminuted posterior malleolar fracture  involves the  distal right tibia, as well. Acute soft tissue contusion is centered  about the fractures. The fractures are thought to be closed. No other  acute fractures are seen. Mild enthesopathy involves the plantar right  calcaneus. External artifacts are noted. The best possible images were  obtained, considering the patient's condition. No retained radiopaque  foreign body. No definite subcutaneous emphysema. If symptoms or  clinical concerns persist, consider imaging follow-up.           Impression:       There is an acute trimalleolar fracture-dislocation involving the right  ankle, as discussed.        Portions of this note were completed with a voice recognition program.     5/7/2025 1:11 AM by Jarred Farley MD on Workstation: [id number]                   Result Review  Result Review:  I have personally reviewed the results from the time of this admission to 5/7/2025 09:14 EDT and agree with these findings:  []  Laboratory  []  Microbiology  []  Radiology  []  EKG/Telemetry   []  Cardiology/Vascular   []  Pathology  []  Old records  []  Other:        Assessment & Plan  Assessment / Plan      Brief Patient Summary:  Davidson Lyle is a 54 y.o. female who sustained a displaced ankle fracture     Active Hospital Problems:       Active Hospital Problems     Diagnosis      **Hypotension           Plan: Discussed the risk and benefits of proceeding with reduction and fixation bleeding infection death blood clots lung problems heart attacks possible need for future surgery possible damage neurovascular structures no guarantees given among others and she wished to proceed.

## 2025-05-08 NOTE — SIGNIFICANT NOTE
05/08/25 0523   OTHER   Discipline occupational therapist   Rehab Time/Intention   Session Not Performed other (see comments)  (scheduled for ortho surgery today)

## 2025-05-08 NOTE — PROGRESS NOTES
[facility]   Hospitalist Progress Note  Date: 2025  Patient Name: Davidson Lyle  : 1970  MRN: [id number]  Date of admission: 2025      Subjective   Subjective     Chief Complaint: Fall    Summary: Davidson Lyle is a 54 y.o. female with past medical history of hypothyroidism and hypertension presents to the ED due to fall.  Patient states she woke up in the melanite to go to the restroom and fell forward.  Patient denies hitting her head.  When EMS arrived patient was found somnolent.  Patient takes many meds at night that can make her drowsy.  Denies chest pain, shortness of breath, abdominal pain, nausea, vomit, diarrhea or fever.  In the ED, patient's vitals show heart rate 58, temperature 97.6, blood pressure 79/43 and satting 100% room air.  Labs show troponin 10, sodium 139, creatinine 1.9, bicarb 18, TSH 26.8, white blood cell 8.6 and hemoglobin 14.5.  Chest x-ray unremarkable.  Ankle x-ray shows acute trimalleolar fracture.  Orthopedic consulted and will evaluate in the morning.  Patient admitted to floors for further management.     Interval Followup: Seen postoperatively, doing okay today, still has some pain but overall improved.  No other complaints.    Objective   Objective     Vitals:   Temp:  [97.9 °F (36.6 °C)-99 °F (37.2 °C)] 98.3 °F (36.8 °C)  Heart Rate:  [71-92] 80  Resp:  [15-21] 18  BP: (100-208)/() 158/84  Flow (L/min) (Oxygen Therapy):  [8] 8  FiO2 (%):  [44 %-46 %] 45 %    Physical Exam   Constitutional: A wake alert no distress  Respiratory: Clear  Cardiovascular RRR  GI: Abdomen soft nontender      Result Review    Result Review:  I have reviewed the following  [x]  Laboratory  CBC          2024    10:23 2025    00:49 2025    04:50 2025    04:30   CBC   WBC 8.13  8.66  8.46  7.89    RBC 5.34  4.87  4.41  4.23    Hemoglobin 16.3  14.5  13.3  12.6    Hematocrit 49.2  44.6  41.2  38.3    MCV 92.1  91.6  93.4  90.5    MCH 30.5  29.8   30.2  29.8    MCHC 33.1  32.5  32.3  32.9    RDW 14.0  14.5  14.4  14.5    Platelets 263  249  166  176      CMP          12/13/2024    10:23 5/7/2025    00:49 5/7/2025    04:50 5/8/2025    04:30   CMP   Glucose 123  134  96  142    BUN 8  17  17  14    Creatinine 1.59  1.90  1.59  1.36    EGFR 38.0  30.7  38.0  45.8    Sodium 143  139  141  137    Potassium 4.4  3.7  4.0  3.7    Chloride 103  102  106  102    Calcium 10.3  9.5  8.6  8.9    Total Protein 8.8  8.2      Albumin 4.7  4.5   3.9    Globulin 4.1  3.7      Total Bilirubin 0.5  0.5      Alkaline Phosphatase 132  113      AST (SGOT) 19  20      ALT (SGPT) 14  16      Albumin/Globulin Ratio 1.1  1.2      BUN/Creatinine Ratio 5.0  8.9  10.7  10.3    Anion Gap 14.0  18.2  11.9  13.0        []  Microbiology  []  Radiology  []  EKG/Telemetry   []  Cardiology/Vascular   []  Pathology  []  Old records  []  Other:    Assessment & Plan   Assessment / Plan   Assessment:    Hypotension resolved  UTI  Mechanical fall  Right ankle fracture secondary to the above  Bradycardia resolved  CKD  Hypothyroidism  CVA  CAD  GERD  Von Willebrand disease    Plan:    Continue antibiotics  Blood pressure is about 160, will consider resuming antihypertensives tomorrow since she came in with hypotension  Status post ORIF 5/8/2025  Pain control with combination of IV morphine and oral oxycodone  Checking 2D echo  Checking thyroid function TSH elevated at 26 continue with Synthroid will need to see if this was recently started or if she has had a recent dose change  Labs in a.m.  PT/OT     Discussed plan with RN.    VTE Prophylaxis:  Mechanical VTE prophylaxis orders are present.        CODE STATUS:   Code Status (Patient has no pulse and is not breathing): CPR (Attempt to Resuscitate)  Medical Interventions (Patient has pulse or is breathing): Full Support      Electronically signed by Ron Hatrley MD, 05/08/25, 5:08 PM EDT.

## 2025-05-08 NOTE — ANESTHESIA POSTPROCEDURE EVALUATION
Patient: Davidson Lyle    Procedure Summary       Date: 05/08/25 Room / Location: [facility] OR  / [facility]    Anesthesia Start: 1246 Anesthesia Stop: 1439    Procedure: ANKLE OPEN REDUCTION INTERNAL FIXATION (Right: Ankle) Diagnosis:       Closed fracture of right ankle, initial encounter      (Closed fracture of right ankle, initial encounter [S82.891A])    Surgeons: Zack Simmons MD Provider: Charlotte Powell MD    Anesthesia Type: general, regional ASA Status: 3            Anesthesia Type: general, regional    Vitals  Vitals Value Taken Time   /92 05/08/25 15:17   Temp 36.6 °C (97.9 °F) 05/08/25 14:55   Pulse 87 05/08/25 15:19   Resp 16 05/08/25 15:00   SpO2 94 % 05/08/25 15:19   Vitals shown include unfiled device data.        Post Anesthesia Care and Evaluation    Patient location during evaluation: bedside  Patient participation: complete - patient participated  Level of consciousness: awake  Pain management: adequate    Airway patency: patent  PONV Status: none  Cardiovascular status: acceptable and stable  Respiratory status: acceptable  Hydration status: acceptable

## 2025-05-09 LAB
ALBUMIN SERPL-MCNC: 4.2 G/DL (ref 3.5–5.2)
ALP SERPL-CCNC: 106 U/L (ref 39–117)
ALT SERPL W P-5'-P-CCNC: 14 U/L (ref 1–33)
ANION GAP SERPL CALCULATED.3IONS-SCNC: 13.3 MMOL/L (ref 5–15)
AST SERPL-CCNC: 19 U/L (ref 1–32)
BASOPHILS # BLD AUTO: 0.02 10*3/MM3 (ref 0–0.2)
BASOPHILS NFR BLD AUTO: 0.1 % (ref 0–1.5)
BILIRUB CONJ SERPL-MCNC: 0.1 MG/DL (ref 0–0.3)
BILIRUB INDIRECT SERPL-MCNC: 0.2 MG/DL
BILIRUB SERPL-MCNC: 0.3 MG/DL (ref 0–1.2)
BUN SERPL-MCNC: 15 MG/DL (ref 6–20)
BUN/CREAT SERPL: 12.4 (ref 7–25)
CALCIUM SPEC-SCNC: 10.2 MG/DL (ref 8.6–10.5)
CHLORIDE SERPL-SCNC: 103 MMOL/L (ref 98–107)
CO2 SERPL-SCNC: 22.7 MMOL/L (ref 22–29)
CREAT SERPL-MCNC: 1.21 MG/DL (ref 0.57–1)
DEPRECATED RDW RBC AUTO: 46.2 FL (ref 37–54)
EGFRCR SERPLBLD CKD-EPI 2021: 52.7 ML/MIN/1.73
EOSINOPHIL # BLD AUTO: 0 10*3/MM3 (ref 0–0.4)
EOSINOPHIL NFR BLD AUTO: 0 % (ref 0.3–6.2)
ERYTHROCYTE [DISTWIDTH] IN BLOOD BY AUTOMATED COUNT: 14.2 % (ref 12.3–15.4)
GLUCOSE SERPL-MCNC: 135 MG/DL (ref 65–99)
HCT VFR BLD AUTO: 40.2 % (ref 34–46.6)
HGB BLD-MCNC: 13.4 G/DL (ref 12–15.9)
IMM GRANULOCYTES # BLD AUTO: 0.06 10*3/MM3 (ref 0–0.05)
IMM GRANULOCYTES NFR BLD AUTO: 0.4 % (ref 0–0.5)
LYMPHOCYTES # BLD AUTO: 1.5 10*3/MM3 (ref 0.7–3.1)
LYMPHOCYTES NFR BLD AUTO: 11 % (ref 19.6–45.3)
MAGNESIUM SERPL-MCNC: 1.9 MG/DL (ref 1.6–2.6)
MCH RBC QN AUTO: 29.8 PG (ref 26.6–33)
MCHC RBC AUTO-ENTMCNC: 33.3 G/DL (ref 31.5–35.7)
MCV RBC AUTO: 89.3 FL (ref 79–97)
MONOCYTES # BLD AUTO: 0.91 10*3/MM3 (ref 0.1–0.9)
MONOCYTES NFR BLD AUTO: 6.7 % (ref 5–12)
NEUTROPHILS NFR BLD AUTO: 11.12 10*3/MM3 (ref 1.7–7)
NEUTROPHILS NFR BLD AUTO: 81.8 % (ref 42.7–76)
NRBC BLD AUTO-RTO: 0 /100 WBC (ref 0–0.2)
PHOSPHATE SERPL-MCNC: 3.2 MG/DL (ref 2.5–4.5)
PLATELET # BLD AUTO: 208 10*3/MM3 (ref 140–450)
PMV BLD AUTO: 10.3 FL (ref 6–12)
POTASSIUM SERPL-SCNC: 4 MMOL/L (ref 3.5–5.2)
PROT SERPL-MCNC: 8.3 G/DL (ref 6–8.5)
RBC # BLD AUTO: 4.5 10*6/MM3 (ref 3.77–5.28)
SODIUM SERPL-SCNC: 139 MMOL/L (ref 136–145)
WBC NRBC COR # BLD AUTO: 13.61 10*3/MM3 (ref 3.4–10.8)

## 2025-05-09 PROCEDURE — 85025 COMPLETE CBC W/AUTO DIFF WBC: CPT | Performed by: PHYSICIAN ASSISTANT

## 2025-05-09 PROCEDURE — 97161 PT EVAL LOW COMPLEX 20 MIN: CPT

## 2025-05-09 PROCEDURE — 25010000002 CEFTRIAXONE PER 250 MG: Performed by: ORTHOPAEDIC SURGERY

## 2025-05-09 PROCEDURE — 80048 BASIC METABOLIC PNL TOTAL CA: CPT | Performed by: PHYSICIAN ASSISTANT

## 2025-05-09 PROCEDURE — 25010000002 HYDRALAZINE PER 20 MG: Performed by: ORTHOPAEDIC SURGERY

## 2025-05-09 PROCEDURE — 94799 UNLISTED PULMONARY SVC/PX: CPT

## 2025-05-09 PROCEDURE — 99233 SBSQ HOSP IP/OBS HIGH 50: CPT | Performed by: INTERNAL MEDICINE

## 2025-05-09 PROCEDURE — 25010000002 MORPHINE PER 10 MG: Performed by: ORTHOPAEDIC SURGERY

## 2025-05-09 PROCEDURE — 94761 N-INVAS EAR/PLS OXIMETRY MLT: CPT

## 2025-05-09 PROCEDURE — 83735 ASSAY OF MAGNESIUM: CPT | Performed by: PHYSICIAN ASSISTANT

## 2025-05-09 PROCEDURE — 80076 HEPATIC FUNCTION PANEL: CPT | Performed by: PHYSICIAN ASSISTANT

## 2025-05-09 PROCEDURE — 84100 ASSAY OF PHOSPHORUS: CPT | Performed by: PHYSICIAN ASSISTANT

## 2025-05-09 PROCEDURE — 97165 OT EVAL LOW COMPLEX 30 MIN: CPT

## 2025-05-09 RX ORDER — AMLODIPINE BESYLATE 5 MG/1
5 TABLET ORAL
Status: DISCONTINUED | OUTPATIENT
Start: 2025-05-09 | End: 2025-05-10

## 2025-05-09 RX ADMIN — LEVOTHYROXINE SODIUM 25 MCG: 0.03 TABLET ORAL at 06:26

## 2025-05-09 RX ADMIN — OXYCODONE 10 MG: 5 TABLET ORAL at 20:05

## 2025-05-09 RX ADMIN — MORPHINE SULFATE 1 MG: 2 INJECTION, SOLUTION INTRAMUSCULAR; INTRAVENOUS at 12:19

## 2025-05-09 RX ADMIN — OXYCODONE 10 MG: 5 TABLET ORAL at 02:27

## 2025-05-09 RX ADMIN — HYDRALAZINE HYDROCHLORIDE 10 MG: 20 INJECTION INTRAMUSCULAR; INTRAVENOUS at 00:58

## 2025-05-09 RX ADMIN — MORPHINE SULFATE 1 MG: 2 INJECTION, SOLUTION INTRAMUSCULAR; INTRAVENOUS at 18:36

## 2025-05-09 RX ADMIN — Medication 10 ML: at 09:27

## 2025-05-09 RX ADMIN — OXYCODONE 10 MG: 5 TABLET ORAL at 09:26

## 2025-05-09 RX ADMIN — MORPHINE SULFATE 1 MG: 2 INJECTION, SOLUTION INTRAMUSCULAR; INTRAVENOUS at 06:48

## 2025-05-09 RX ADMIN — HYDRALAZINE HYDROCHLORIDE 25 MG: 25 TABLET ORAL at 20:05

## 2025-05-09 RX ADMIN — AMLODIPINE BESYLATE 5 MG: 5 TABLET ORAL at 09:26

## 2025-05-09 RX ADMIN — Medication 10 ML: at 20:07

## 2025-05-09 RX ADMIN — CEFTRIAXONE SODIUM 2000 MG: 2 INJECTION, POWDER, FOR SOLUTION INTRAMUSCULAR; INTRAVENOUS at 09:26

## 2025-05-09 RX ADMIN — SENNOSIDES AND DOCUSATE SODIUM 2 TABLET: 50; 8.6 TABLET ORAL at 20:05

## 2025-05-09 RX ADMIN — HYDRALAZINE HYDROCHLORIDE 25 MG: 25 TABLET ORAL at 09:26

## 2025-05-09 NOTE — PLAN OF CARE
Goal Outcome Evaluation:           Progress: improving  Outcome Evaluation: POD1, recovering well. Pain controlled with prn pain meds. Lower neurovascular assessment WDL for pt. Treated with prn hydralazine for elevated BP. PT has been consulted.

## 2025-05-09 NOTE — THERAPY EVALUATION
Patient Name: Davidson Lyle  : 1970    MRN: [id number]                              Today's Date: 2025       Admit Date: 2025    Visit Dx:     ICD-10-CM ICD-9-CM   1. Closed trimalleolar fracture of right ankle, initial encounter  S82.851A 824.6   2. Altered mental status, unspecified altered mental status type  R41.82 780.97   3. Decreased activities of daily living (ADL)  Z78.9 V49.89     Patient Active Problem List   Diagnosis    HAIM (acute kidney injury)    Essential hypertension    Chronic pain syndrome    Obesity    Hyponatremia    Acute kidney injury (HAIM) with acute tubular necrosis (ATN)    Hypotension     Past Medical History:   Diagnosis Date    Depression     Disease of thyroid gland     Elevated cholesterol     GERD (gastroesophageal reflux disease)     Hypertension     PONV (postoperative nausea and vomiting)     PTSD (post-traumatic stress disorder)     Von Willebrand disease      Past Surgical History:   Procedure Laterality Date    BACK SURGERY      COSMETIC SURGERY        General Information       Row Name 25 1313          OT Time and Intention    Document Type evaluation  -LF     Mode of Treatment individual therapy;occupational therapy  -LF       Row Name 25 1313          General Information    Patient Profile Reviewed yes  -LF     Prior Level of Function --  (I) with ADLs, ambulated w/o a device, has a step over tub where she stands to shower, standard commode, stands to groom, drives, and no home O2.  -LF     Existing Precautions/Restrictions fall;weight bearing  TTWB RLE  -     Barriers to Rehab none identified  -       Row Name 25 1313          Occupational Profile    Reason for Services/Referral (Occupational Profile) Patient is a 54 year old female who is currently status post ORIF of right distal fibula fx, medial malleolus fx, and syndesmotic disruption on May 8th, 2025. Occupational therapy consulted due to recent decline in ADLs/functional  transfers. No previous occupational therapy services for current condition.  -       Row Name 05/09/25 1313          Living Environment    Current Living Arrangements home  -     People in Home spouse  -       Row Name 05/09/25 1313          Home Main Entrance    Number of Stairs, Main Entrance two  -     Stair Railings, Main Entrance none  -       Row Name 05/09/25 1313          Stairs Within Home, Primary    Number of Stairs, Within Home, Primary none  -       Row Name 05/09/25 1313          Cognition    Orientation Status (Cognition) oriented x 3  -       Row Name 05/09/25 1313          Safety Issues/Impairments Affecting Functional Mobility    Impairments Affecting Function (Mobility) balance;endurance/activity tolerance;pain  -LF               User Key  (r) = Recorded By, (t) = Taken By, (c) = Cosigned By      Initials Name Provider Type     Addis Villalobos OT Occupational Therapist                     Mobility/ADL's       Row Name 05/09/25 1315          Bed Mobility    Bed Mobility supine-sit;sit-supine  -     Supine-Sit Clarinda (Bed Mobility) moderate assist (50% patient effort)  -     Sit-Supine [place] (Bed Mobility) moderate assist (50% patient effort)  -     Bed Mobility, Safety Issues decreased use of arms for pushing/pulling;decreased use of legs for bridging/pushing  -     Assistive Device (Bed Mobility) bed rails;head of bed elevated;repositioning sheet  -       Row Name 05/09/25 1315          Transfers    Transfers sit-stand transfer;stand-sit transfer  -       Row Name 05/09/25 1315          Sit-Stand Transfer    Sit-Stand Clarinda (Transfers) minimum assist (75% patient effort)  -     Assistive Device (Sit-Stand Transfers) walker, front-wheeled  -       Row Name 05/09/25 1315          Stand-Sit Transfer    Stand-Sit Clarinda (Transfers) minimum assist (75% patient effort)  -     Assistive Device (Stand-Sit Transfers) walker, front-wheeled  -        Row Name 05/09/25 1315          Activities of Daily Living    BADL Assessment/Intervention bathing;upper body dressing;lower body dressing;grooming;feeding;toileting  -[facility] Name 05/09/25 1315          Mobility    Extremity Weight-bearing Status right lower extremity  -     Right Lower Extremity (Weight-bearing Status) toe touch weight-bearing (TTWB)  -[facility] Name 05/09/25 1315          Bathing Assessment/Intervention    [place] Level (Bathing) bathing skills;upper body;set up;lower body;maximum assist (25% patient effort)  -[facility] Name 05/09/25 1315          Upper Body Dressing Assessment/Training    [place] Level (Upper Body Dressing) upper body dressing skills;set up  -[facility] Name 05/09/25 1315          Lower Body Dressing Assessment/Training    [place] Level (Lower Body Dressing) lower body dressing skills;maximum assist (25% patient effort)  -LF       Row Name 05/09/25 1315          Grooming Assessment/Training    [place] Level (Grooming) grooming skills;set up  -LF       Row Name 05/09/25 1315          Self-Feeding Assessment/Training    [place] Level (Feeding) feeding skills;set up  -[facility] Name 05/09/25 1315          Toileting Assessment/Training    [place] Level (Toileting) toileting skills;maximum assist (25% patient effort);dependent (less than 25% patient effort)  -     Comment, (Toileting) Female purewick currently in place  -               User Key  (r) = Recorded By, (t) = Taken By, (c) = Cosigned By      Initials Name Provider Type     Addis Villalobos OT Occupational Therapist                   Obj/Interventions       [facility] Name 05/09/25 1317          Sensory Assessment (Somatosensory)    Sensory Assessment (Somatosensory) UE sensation intact  -[facility] Name 05/09/25 1317          Vision Assessment/Intervention    Visual Impairment/Limitations WFL  -LF       Row Name 05/09/25 1317          Range of Motion Comprehensive     General Range of Motion bilateral upper extremity ROM WFL  -LF       Row Name 05/09/25 1317          Strength Comprehensive (MMT)    Comment, General Manual Muscle Testing (MMT) Assessment 4/5 BUEs  -LF       Row Name 05/09/25 1317          Motor Skills    Motor Skills coordination;functional endurance  -LF     Coordination bilateral;upper extremity;WFL  -LF     Functional Endurance Fair-  -LF       Row Name 05/09/25 1317          Balance    Balance Assessment sitting dynamic balance;standing dynamic balance  -LF     Dynamic Sitting Balance supervision  -LF     Position, Sitting Balance unsupported;sitting edge of bed  -LF     Dynamic Standing Balance minimal assist  -LF     Position/Device Used, Standing Balance supported;walker, front-wheeled  -LF               User Key  (r) = Recorded By, (t) = Taken By, (c) = Cosigned By      Initials Name Provider Type    LF Addis Villalobos OT Occupational Therapist                   Goals/Plan       Row Name 05/09/25 1319          Bed Mobility Goal 1 (OT)    Activity/Assistive Device (Bed Mobility Goal 1, OT) bed mobility activities, all  -LF     [place] Level/Cues Needed (Bed Mobility Goal 1, OT) standby assist  -LF     Time Frame (Bed Mobility Goal 1, OT) long term goal (LTG);10 days  -       Row Name 05/09/25 1319          Transfer Goal 1 (OT)    Activity/Assistive Device (Transfer Goal 1, OT) transfers, all  -LF     [place] Level/Cues Needed (Transfer Goal 1, OT) standby assist  -LF     Time Frame (Transfer Goal 1, OT) long term goal (LTG);10 days  -[facility] Name 05/09/25 1319          Bathing Goal 1 (OT)    Activity/Device (Bathing Goal 1, OT) bathing skills, all  -LF     [place] Level/Cues Needed (Bathing Goal 1, OT) standby assist  -LF     Time Frame (Bathing Goal 1, OT) long term goal (LTG);10 days  -       Row Name 05/09/25 1319          Dressing Goal 1 (OT)    Activity/Device (Dressing Goal 1, OT) dressing skills, all  -LF      Stone/Cues Needed (Dressing Goal 1, OT) standby assist  -LF     Time Frame (Dressing Goal 1, OT) long term goal (LTG);10 days  -       Row Name 05/09/25 1319          Toileting Goal 1 (OT)    Activity/Device (Toileting Goal 1, OT) toileting skills, all  -LF     Stone Level/Cues Needed (Toileting Goal 1, OT) standby assist  -     Time Frame (Toileting Goal 1, OT) long term goal (LTG);10 days  -       Row Name 05/09/25 1319          Problem Specific Goal 1 (OT)    Problem Specific Goal 1 (OT) Patient will demonstrate good endurance to support ADLs/functional transfers.  -     Time Frame (Problem Specific Goal 1, OT) long term goal (LTG);10 days  -       Row Name 05/09/25 1319          Therapy Assessment/Plan (OT)    Planned Therapy Interventions (OT) activity tolerance training;BADL retraining;functional balance retraining;occupation/activity based interventions;patient/caregiver education/training;strengthening exercise;transfer/mobility retraining  -               User Key  (r) = Recorded By, (t) = Taken By, (c) = Cosigned By      Initials Name Provider Type     Addis Villalobos, OT Occupational Therapist                   Clinical Impression       Row Name 05/09/25 1317          Pain Assessment    Additional Documentation Pain Scale: FACES Pre/Post-Treatment (Group)  -[facility] Name 05/09/25 1317          Pain Scale: FACES Pre/Post-Treatment    Pain: FACES Scale, Pretreatment 8-->hurts whole lot  -LF     Posttreatment Pain Rating 8-->hurts whole lot  -       Row Name 05/09/25 1317          Plan of Care Review    Plan of Care Reviewed With patient;sibling  -LF     Progress no change  -     Outcome Evaluation Patient presents with limitations in self-care, functional transfers, balance, and endurance. She would benefit from continued skilled occupational therapy services to maximize independence with ADLs/functional transfers.  -       Row Name 05/09/25 1317          Therapy  Assessment/Plan (OT)    Patient/Family Therapy Goal Statement (OT) To maximize independence.  -LF     Rehab Potential (OT) good  -LF     Criteria for Skilled Therapeutic Interventions Met (OT) yes;meets criteria;skilled treatment is necessary  -     Therapy Frequency (OT) 5 times/wk  -       Row Name 05/09/25 1317          Therapy Plan Review/Discharge Plan (OT)    Equipment Needs Upon Discharge (OT) walker, rolling;commode chair;tub bench  -     Anticipated Discharge Disposition (OT) inpatient rehabilitation facility  -       Row Name 05/09/25 1317          Vital Signs    O2 Delivery Pre Treatment room air  -LF     O2 Delivery Intra Treatment room air  -LF     O2 Delivery Post Treatment room air  -       Row Name 05/09/25 1317          Positioning and Restraints    Pre-Treatment Position in bed  -LF     Post Treatment Position bed  -LF     In Bed fowlers;call light within reach;encouraged to call for assist  no alarm active on arrival  -               User Key  (r) = Recorded By, (t) = Taken By, (c) = Cosigned By      Initials Name Provider Type     Addis Villalobos, OT Occupational Therapist                   Outcome Measures       Row Name 05/09/25 1320          How much help from another is currently needed...    Putting on and taking off regular lower body clothing? 2  -LF     Bathing (including washing, rinsing, and drying) 2  -LF     Toileting (which includes using toilet bed pan or urinal) 1  -LF     Putting on and taking off regular upper body clothing 4  -LF     Taking care of personal grooming (such as brushing teeth) 4  -LF     Eating meals 4  -LF     AM-PAC 6 Clicks Score (OT) 17  -       Row Name 05/09/25 0815          How much help from another person do you currently need...    Turning from your back to your side while in flat bed without using bedrails? 3  -KP     Moving from lying on back to sitting on the side of a flat bed without bedrails? 3  -KP     Moving to and from a bed to a  chair (including a wheelchair)? 3  -KP     Standing up from a chair using your arms (e.g., wheelchair, bedside chair)? 3  -KP     Climbing 3-5 steps with a railing? 2  -KP     To walk in hospital room? 2  -KP     AM-PAC 6 Clicks Score (PT) 16  -KP       Row Name 05/09/25 1320          Functional Assessment    Outcome Measure Options AM-PAC 6 Clicks Daily Activity (OT);Optimal Instrument  -LF       Row Name 05/09/25 1320          Optimal Instrument    Optimal Instrument Optimal - 3  -LF     Bending/Stooping 4  -LF     Standing 2  -LF     Reaching 1  -LF     From the list, choose the 3 activities you would most like to be able to do without any difficulty Bending/stooping;Standing;Reaching  -LF     Total Score Optimal - 3 7  -LF               User Key  (r) = Recorded By, (t) = Taken By, (c) = Cosigned By      Initials Name Provider Type     Addis Villalobos OT Occupational Therapist    Jessa Maddox, RN Registered Nurse                    Occupational Therapy Education       Title: PT OT SLP Therapies (In Progress)       Topic: Occupational Therapy (In Progress)       Point: ADL training (In Progress)       Learning Progress Summary            Patient Acceptance, E,TB, NR by  at 5/9/2025 1327                      Point: Precautions (In Progress)       Learning Progress Summary            Patient Acceptance, E,TB, NR by  at 5/9/2025 1327                      Point: Body mechanics (In Progress)       Learning Progress Summary            Patient Acceptance, E,TB, NR by  at 5/9/2025 1327                                      User Key       Initials Effective Dates Name Provider Type Discipline     06/16/21 -  Addis Villalobos OT Occupational Therapist OT                  OT Recommendation and Plan  Planned Therapy Interventions (OT): activity tolerance training, BADL retraining, functional balance retraining, occupation/activity based interventions, patient/caregiver education/training, strengthening  exercise, transfer/mobility retraining  Therapy Frequency (OT): 5 times/wk  Plan of Care Review  Plan of Care Reviewed With: patient, sibling  Progress: no change  Outcome Evaluation: Patient presents with limitations in self-care, functional transfers, balance, and endurance. She would benefit from continued skilled occupational therapy services to maximize independence with ADLs/functional transfers.     Time Calculation:   Evaluation Complexity (OT)  Review Occupational Profile/Medical/Therapy History Complexity: brief/low complexity  Assessment, Occupational Performance/Identification of Deficit Complexity: 3-5 performance deficits  Clinical Decision Making Complexity (OT): problem focused assessment/low complexity  Overall Complexity of Evaluation (OT): low complexity     Time Calculation- OT       Row Name 05/09/25 1327             Time Calculation- OT    OT Received On 05/09/25  -LF      OT Goal Re-Cert Due Date 05/18/25  -LF         Untimed Charges    OT Eval/Re-eval Minutes 46  -LF         Total Minutes    Untimed Charges Total Minutes 46  -LF       Total Minutes 46  -LF                User Key  (r) = Recorded By, (t) = Taken By, (c) = Cosigned By      Initials Name Provider Type    LF Addis Villalobos OT Occupational Therapist                  Therapy Charges for Today       Code Description Service Date Service Provider Modifiers Qty    [id number] HC OT EVAL LOW COMPLEXITY 4 5/9/2025 Addis Villalobos OT GO 1                 Addis Villalobos OT  5/9/2025

## 2025-05-09 NOTE — PLAN OF CARE
Goal Outcome Evaluation:  Plan of Care Reviewed With: patient, sibling        Progress: no change  Outcome Evaluation: Patient presents with deficits in balance, endurance, transfers, and ambulation. Patient will benefit from skilled PT services to address these mobility deficits and decrease risk of falls.    Anticipated Discharge Disposition (PT): inpatient rehabilitation facility

## 2025-05-09 NOTE — THERAPY EVALUATION
Acute Care - Physical Therapy Initial Evaluation   [facility]     Patient Name: Davidson Lyle  : 1970  MRN: [id number]  Today's Date: 2025      Visit Dx:     ICD-10-CM ICD-9-CM   1. Closed trimalleolar fracture of right ankle, initial encounter  S82.851A 824.6   2. Altered mental status, unspecified altered mental status type  R41.82 780.97   3. Decreased activities of daily living (ADL)  Z78.9 V49.89   4. Difficulty walking  R26.2 719.7     Patient Active Problem List   Diagnosis    HAIM (acute kidney injury)    Essential hypertension    Chronic pain syndrome    Obesity    Hyponatremia    Acute kidney injury (HAIM) with acute tubular necrosis (ATN)    Hypotension     Past Medical History:   Diagnosis Date    Depression     Disease of thyroid gland     Elevated cholesterol     GERD (gastroesophageal reflux disease)     Hypertension     PONV (postoperative nausea and vomiting)     PTSD (post-traumatic stress disorder)     Von Willebrand disease      Past Surgical History:   Procedure Laterality Date    BACK SURGERY      COSMETIC SURGERY       PT Assessment (Last 12 Hours)       PT Evaluation and Treatment       Row Name 25 1400          Physical Therapy Time and Intention    Document Type evaluation  -AV     Mode of Treatment individual therapy;physical therapy  -AV       Row Name 25 1400          General Information    Patient Profile Reviewed yes  -AV     Prior Level of Function independent:;all household mobility;gait;transfer;ADL's  Ambulated without an assistive device. No home O2.  -AV     Existing Precautions/Restrictions fall;weight bearing  TTWB RLE  -AV       Row Name 25 1400          Living Environment    Current Living Arrangements home  -AV     Home Accessibility stairs to enter home  -AV     People in Home spouse  -AV       Row Name 25 1400          Home Main Entrance    Number of Stairs, Main Entrance two  Elongated steps up to porch  -AV     Stair Railings, Main  Entrance none  -AV       [facility] Name 05/09/25 1400          Pain    Additional Documentation Pain Scale: FACES Pre/Post-Treatment (Group)  -       Row Name 05/09/25 1400          Pain Scale: FACES Pre/Post-Treatment    Pain: FACES Scale, Pretreatment 8-->hurts whole lot  -AV     Posttreatment Pain Rating 8-->hurts whole lot  -AV       [facility] Name 05/09/25 1400          Cognition    Orientation Status (Cognition) oriented x 3  [facility] Name 05/09/25 1400          Range of Motion (ROM)    Range of Motion right lower extremity ROM detail;left lower extremity ROM detail  -AV     Left Lower Extremity (ROM) left LE ROM is WFL  -AV     Right Lower Extremity (ROM) right LE ROM is WFL except;ankle  -AV     Ankle, Right (Range of Motion) Not tested due to being immobilized in splint  -AV       [facility] Name 05/09/25 1400          Mobility    Extremity Weight-bearing Status right lower extremity  -AV     Right Lower Extremity (Weight-bearing Status) toe touch weight-bearing (TTWB)  -[facility] [place] Name 05/09/25 1400          Bed Mobility    Bed Mobility supine-sit;sit-supine  -AV     Supine-Sit [place] (Bed Mobility) moderate assist (50% patient effort)  -AV     Sit-Supine [place] (Bed Mobility) moderate assist (50% patient effort)  -AV     Bed Mobility, Safety Issues decreased use of arms for pushing/pulling;decreased use of legs for bridging/pushing  -     Assistive Device (Bed Mobility) bed rails;head of bed elevated  -[facility] [place] Name 05/09/25 1400          Transfers    Transfers sit-stand transfer;stand-sit transfer  [facility] [place] Name 05/09/25 1400          Sit-Stand Transfer    Sit-Stand [place] (Transfers) minimum assist (75% patient effort)  -AV     Assistive Device (Sit-Stand Transfers) walker, front-wheeled  -[facility] [place] Name 05/09/25 1400          Stand-Sit Transfer    Stand-Sit [place] (Transfers) minimum assist (75% patient effort)  -AV     Assistive Device (Stand-Sit Transfers) walker,  front-wheeled  -AV       Row Name 05/09/25 1400          Gait/Stairs (Locomotion)    Gait/Stairs Locomotion gait/ambulation independence;gait/ambulation assistive device;distance ambulated  -AV     [place] Level (Gait) minimum assist (75% patient effort)  -AV     Assistive Device (Gait) --  knee scooter  -AV     Distance in Feet (Gait) 40  x5 standing breaks due to fatigue  -AV       Row Name 05/09/25 1400          Safety Issues/Impairments Affecting Functional Mobility    Impairments Affecting Function (Mobility) balance;endurance/activity tolerance;pain  -AV       Row Name 05/09/25 1400          Balance    Balance Assessment standing dynamic balance  -AV     Dynamic Standing Balance minimal assist  -AV     Position/Device Used, Standing Balance supported  knee scooter  -AV       Row Name             Wound 05/08/25 1435 Right distal leg Surgical    Wound - Properties Group Placement Date: 05/08/25  -TM Placement Time: 1435  -TM Side: Right  -TM Orientation: distal  -TM Location: leg  -TM Primary Wound Type: Surgical  -TM    Retired Wound - Properties Group Placement Date: 05/08/25  -TM Placement Time: 1435  -TM Side: Right  -TM Orientation: distal  -TM Location: leg  -TM    Retired Wound - Properties Group Placement Date: 05/08/25  -TM Placement Time: 1435  -TM Side: Right  -TM Orientation: distal  -TM Location: leg  -TM    Retired Wound - Properties Group Date first assessed: 05/08/25  -TM Time first assessed: 1435  -TM Side: Right  -TM Location: leg  -TM      Row Name 05/09/25 1400          Plan of Care Review    Plan of Care Reviewed With patient;sibling  -AV     Progress no change  -AV     Outcome Evaluation Patient presents with deficits in balance, endurance, transfers, and ambulation. Patient will benefit from skilled PT services to address these mobility deficits and decrease risk of falls.  -AV       Row Name 05/09/25 1400          Positioning and Restraints    Pre-Treatment Position in bed  -AV      Post Treatment Position bed  -AV     In Bed fowlers;call light within reach;encouraged to call for assist;with family/caregiver  No alarms active upon therapist entry  -AV       Row Name 05/09/25 1400          Therapy Assessment/Plan (PT)    Rehab Potential (PT) good  -AV     Criteria for Skilled Interventions Met (PT) yes;meets criteria  -AV     Therapy Frequency (PT) daily  -AV     Predicted Duration of Therapy Intervention (PT) 10 days  -AV     Problem List (PT) problems related to;balance;mobility;strength;pain  -AV     Activity Limitations Related to Problem List (PT) unable to transfer safely;unable to ambulate safely  -AV       Row Name 05/09/25 1400          PT Evaluation Complexity    History, PT Evaluation Complexity 1-2 personal factors and/or comorbidities  -AV     Examination of Body Systems (PT Eval Complexity) total of 4 or more elements  -AV     Clinical Presentation (PT Evaluation Complexity) stable  -AV     Clinical Decision Making (PT Evaluation Complexity) low complexity  -AV     Overall Complexity (PT Evaluation Complexity) low complexity  -AV       Row Name 05/09/25 1400          Therapy Plan Review/Discharge Plan (PT)    Therapy Plan Review (PT) evaluation/treatment results reviewed;patient  -AV       Row Name 05/09/25 1400          Physical Therapy Goals    Bed Mobility Goal Selection (PT) bed mobility, PT goal 1  -AV     Transfer Goal Selection (PT) transfer, PT goal 1  -AV     Gait Training Goal Selection (PT) gait training, PT goal 1  -AV       Row Name 05/09/25 1400          Bed Mobility Goal 1 (PT)    Activity/Assistive Device (Bed Mobility Goal 1, PT) sit to supine/supine to sit  -AV     [place] Level/Cues Needed (Bed Mobility Goal 1, PT) standby assist  -AV     Time Frame (Bed Mobility Goal 1, PT) 10 days  -AV       Row Name 05/09/25 1400          Transfer Goal 1 (PT)    Activity/Assistive Device (Transfer Goal 1, PT) sit-to-stand/stand-to-sit;bed-to-chair/chair-to-bed;walker,  rolling  -AV     [place] Level/Cues Needed (Transfer Goal 1, PT) standby assist  -AV     Time Frame (Transfer Goal 1, PT) 10 days  -AV       Row Name 05/09/25 1400          Gait Training Goal 1 (PT)    Activity/Assistive Device (Gait Training Goal 1, PT) gait (walking locomotion);other (see comments)  knee scooter  -AV     [place] Level (Gait Training Goal 1, PT) standby assist  -AV     Distance (Gait Training Goal 1, PT) 150  -AV     Time Frame (Gait Training Goal 1, PT) 10 days  -AV               User Key  (r) = Recorded By, (t) = Taken By, (c) = Cosigned By      Initials Name Provider Type    TM Violeta Lewis, RN Registered Nurse    AV Earl Velasquez, PT Physical Therapist                    Physical Therapy Education       Title: PT OT SLP Therapies (In Progress)       Topic: Physical Therapy (In Progress)       Point: Mobility training (Done)       Learning Progress Summary            Patient Acceptance, E,TB, VU by AV at 5/9/2025 1507                      Point: Home exercise program (Not Started)       Learner Progress:  Not documented in this visit.              Point: Body mechanics (Done)       Learning Progress Summary            Patient Acceptance, E,TB, VU by AV at 5/9/2025 1507                      Point: Precautions (Done)       Learning Progress Summary            Patient Acceptance, E,TB, VU by AV at 5/9/2025 1507                                      User Key       Initials Effective Dates Name Provider Type Discipline    AV 06/11/21 -  Earl Velasquez, PT Physical Therapist PT                  PT Recommendation and Plan  Anticipated Discharge Disposition (PT): inpatient rehabilitation facility  Planned Therapy Interventions (PT): balance training, bed mobility training, gait training, home exercise program, neuromuscular re-education, strengthening, transfer training  Therapy Frequency (PT): daily  Plan of Care Reviewed With: patient, sibling  Progress: no change  Outcome  Evaluation: Patient presents with deficits in balance, endurance, transfers, and ambulation. Patient will benefit from skilled PT services to address these mobility deficits and decrease risk of falls.   Outcome Measures       Row Name 05/09/25 1500             How much help from another person do you currently need...    Turning from your back to your side while in flat bed without using bedrails? 3  -AV      Moving from lying on back to sitting on the side of a flat bed without bedrails? 2  -AV      Moving to and from a bed to a chair (including a wheelchair)? 3  -AV      Standing up from a chair using your arms (e.g., wheelchair, bedside chair)? 3  -AV      Climbing 3-5 steps with a railing? 2  -AV      To walk in hospital room? 2  -AV      AM-PAC 6 Clicks Score (PT) 15  -AV         Functional Assessment    Outcome Measure Options AM-PAC 6 Clicks Basic Mobility (PT)  -AV                User Key  (r) = Recorded By, (t) = Taken By, (c) = Cosigned By      Initials Name Provider Type    AV Earl Velasquez, PT Physical Therapist                     Time Calculation:    PT Charges       Row Name 05/09/25 1505             Time Calculation    PT Received On 05/09/25  -AV      PT Goal Re-Cert Due Date 05/18/25  -AV         Untimed Charges    PT Eval/Re-eval Minutes 50  -AV         Total Minutes    Untimed Charges Total Minutes 50  -AV       Total Minutes 50  -AV                User Key  (r) = Recorded By, (t) = Taken By, (c) = Cosigned By      Initials Name Provider Type    AV Earl Velasquez, PT Physical Therapist                  Therapy Charges for Today       Code Description Service Date Service Provider Modifiers Qty    [id number] HC PT EVAL LOW COMPLEXITY 4 5/9/2025 Earl Velasquez, PT GP 1            PT G-Codes  Outcome Measure Options: AM-PAC 6 Clicks Basic Mobility (PT)  AM-PAC 6 Clicks Score (PT): 15  AM-PAC 6 Clicks Score (OT): 17    Earl Velasquez PT  5/9/2025

## 2025-05-09 NOTE — PROGRESS NOTES
[facility]   Hospitalist Progress Note  Date: 2025  Patient Name: Davidson Lyle  : 1970  MRN: [id number]  Date of admission: 2025      Subjective   Subjective     Chief Complaint: Fall    Summary: Davidson Lyle is a 54 y.o. female with past medical history of hypothyroidism and hypertension presents to the ED due to fall.  Patient states she woke up in the [place] to go to the restroom and fell forward.  Patient denies hitting her head.  When EMS arrived patient was found somnolent.  Patient takes many meds at night that can make her drowsy.  Denies chest pain, shortness of breath, abdominal pain, nausea, vomit, diarrhea or fever.  In the ED, patient's vitals show heart rate 58, temperature 97.6, blood pressure 79/43 and satting 100% room air.  Labs show troponin 10, sodium 139, creatinine 1.9, bicarb 18, TSH 26.8, white blood cell 8.6 and hemoglobin 14.5.  Chest x-ray unremarkable.  Ankle x-ray shows acute trimalleolar fracture.  Orthopedic consulted and will evaluate in the morning.  Patient admitted to floors for further management.     Interval Followup: Seen and examined resting comfortably pain reasonably controlled with provided medications.  Blood pressures this morning remain markedly elevated Norvasc added.  Nonweightbearing on affected extremity would likely benefit from rehab but patient is refusing.  Objective   Objective     Vitals:   Temp:  [97.9 °F (36.6 °C)-99.1 °F (37.3 °C)] 99 °F (37.2 °C)  Heart Rate:  [] 91  Resp:  [15-21] 18  BP: (100-193)/(58-96) 118/77  Flow (L/min) (Oxygen Therapy):  [8] 8    Physical Exam   Constitutional: A wake alert no distress  Respiratory: Clear  Cardiovascular RRR  GI: Abdomen soft nontender      Result Review    Result Review:  I have reviewed the following  [x]  Laboratory  CBC          2025    00:49 2025    04:50 2025    04:30 2025    04:27   CBC   WBC 8.66  8.46  7.89  13.61    RBC 4.87  4.41  4.23  4.50     Hemoglobin 14.5  13.3  12.6  13.4    Hematocrit 44.6  41.2  38.3  40.2    MCV 91.6  93.4  90.5  89.3    MCH 29.8  30.2  29.8  29.8    MCHC 32.5  32.3  32.9  33.3    RDW 14.5  14.4  14.5  14.2    Platelets 249  166  176  208      CMP          5/7/2025    00:49 5/7/2025    04:50 5/8/2025    04:30 5/9/2025    04:27   CMP   Glucose 134  96  142  135    BUN 17  17  14  15    Creatinine 1.90  1.59  1.36  1.21    EGFR 30.7  38.0  45.8  52.7    Sodium 139  141  137  139    Potassium 3.7  4.0  3.7  4.0    Chloride 102  106  102  103    Calcium 9.5  8.6  8.9  10.2    Total Protein 8.2    8.3    Albumin 4.5   3.9  4.2    Globulin 3.7       Total Bilirubin 0.5    0.3    Alkaline Phosphatase 113    106    AST (SGOT) 20    19    ALT (SGPT) 16    14    Albumin/Globulin Ratio 1.2       BUN/Creatinine Ratio 8.9  10.7  10.3  12.4    Anion Gap 18.2  11.9  13.0  13.3        []  Microbiology  []  Radiology  []  EKG/Telemetry   []  Cardiology/Vascular   []  Pathology  []  Old records  []  Other:    Assessment & Plan   Assessment / Plan   Assessment:    Hypotension resolved  UTI  Mechanical fall  Right ankle fracture secondary to the above  Bradycardia resolved  CKD  Hypothyroidism  CVA  CAD  GERD  Von Willebrand disease    Plan:    Continue antibiotics  Started on Norvasc monitor blood pressures  Status post ORIF 5/8/2025  Pain control with combination of IV morphine and oral oxycodone  Checking 2D echo  Checking thyroid function TSH elevated at 26 continue with Synthroid will need to see if this was recently started or if she has had a recent dose change  Labs in a.m.  Trying to figure out accurate home medications  PT/OT  Would likely benefit from rehab but patient is refusing  Discussed plan with RN.    VTE Prophylaxis:  Mechanical VTE prophylaxis orders are present.        CODE STATUS:   Code Status (Patient has no pulse and is not breathing): CPR (Attempt to Resuscitate)  Medical Interventions (Patient has pulse or is breathing):  Full Support  Electronically signed by RAFFI Avila, 05/09/25, 2:25 PM EDT.    Attending Documentation:  Patient independently seen and evaluated, above documentation reflects plan put forth during bedside rounds.  More than 51% of the time of this patient encounter was performed by me. I discussed the care plan with TAMIKO Atkins PA-C, I agree with his findings and plan as documented, what I have added to the care plan and modified is as follows in my documentation and my medical decision making; 54-year-old female came in with hypotension, fall, broke her ankle not that fixed.  Patient was not really clear on what antihypertensives she was taking, nursing found out that she was taking losartan and amlodipine.  Discussed rehab, patient is adamant that she will not go to rehab and will only want to go home.  Likely discharge within the next 24 to 48 hours.  Electronically signed by Ron Hartley MD, 05/09/25, 4:22 PM EDT.      Electronically signed by Ron Hartley MD, 05/08/25, 5:08 PM EDT.

## 2025-05-09 NOTE — PLAN OF CARE
Goal Outcome Evaluation:  Plan of Care Reviewed With: patient        Progress: improving  Outcome Evaluation: VSS. patients BP home meds are being reviewed. amlodipine was restarted. Patient worked with PT today. Pain has been controlled per MAR. Rehab recommended, patient doesn't want to go to a facility. Possible discharge in 24-48hrs

## 2025-05-09 NOTE — PLAN OF CARE
Goal Outcome Evaluation:  Plan of Care Reviewed With: patient, sibling        Progress: no change  Outcome Evaluation: Patient presents with limitations in self-care, functional transfers, balance, and endurance. She would benefit from continued skilled occupational therapy services to maximize independence with ADLs/functional transfers.    Anticipated Discharge Disposition (OT): inpatient rehabilitation facility

## 2025-05-10 PROCEDURE — 94799 UNLISTED PULMONARY SVC/PX: CPT

## 2025-05-10 PROCEDURE — 25010000002 MORPHINE PER 10 MG: Performed by: ORTHOPAEDIC SURGERY

## 2025-05-10 PROCEDURE — 25010000002 CEFTRIAXONE PER 250 MG: Performed by: ORTHOPAEDIC SURGERY

## 2025-05-10 PROCEDURE — 99232 SBSQ HOSP IP/OBS MODERATE 35: CPT | Performed by: INTERNAL MEDICINE

## 2025-05-10 PROCEDURE — 97116 GAIT TRAINING THERAPY: CPT

## 2025-05-10 PROCEDURE — 97530 THERAPEUTIC ACTIVITIES: CPT

## 2025-05-10 PROCEDURE — 25010000002 DIPHENHYDRAMINE PER 50 MG: Performed by: PHYSICIAN ASSISTANT

## 2025-05-10 RX ORDER — BENZOCAINE/MENTHOL 6 MG-10 MG
1 LOZENGE MUCOUS MEMBRANE EVERY 12 HOURS SCHEDULED
Status: DISCONTINUED | OUTPATIENT
Start: 2025-05-10 | End: 2025-05-12 | Stop reason: HOSPADM

## 2025-05-10 RX ORDER — DIPHENHYDRAMINE HCL 25 MG
25 CAPSULE ORAL EVERY 8 HOURS PRN
Status: DISCONTINUED | OUTPATIENT
Start: 2025-05-10 | End: 2025-05-12 | Stop reason: HOSPADM

## 2025-05-10 RX ORDER — DIPHENHYDRAMINE HYDROCHLORIDE 50 MG/ML
25 INJECTION, SOLUTION INTRAMUSCULAR; INTRAVENOUS ONCE
Status: COMPLETED | OUTPATIENT
Start: 2025-05-10 | End: 2025-05-10

## 2025-05-10 RX ORDER — ASPIRIN 81 MG/1
81 TABLET, CHEWABLE ORAL DAILY
Status: DISCONTINUED | OUTPATIENT
Start: 2025-05-10 | End: 2025-05-12 | Stop reason: HOSPADM

## 2025-05-10 RX ORDER — AMLODIPINE BESYLATE 10 MG/1
10 TABLET ORAL
Status: DISCONTINUED | OUTPATIENT
Start: 2025-05-10 | End: 2025-05-12 | Stop reason: HOSPADM

## 2025-05-10 RX ORDER — CLOPIDOGREL BISULFATE 75 MG/1
75 TABLET ORAL DAILY
Status: DISCONTINUED | OUTPATIENT
Start: 2025-05-10 | End: 2025-05-12 | Stop reason: HOSPADM

## 2025-05-10 RX ADMIN — OXYCODONE 10 MG: 5 TABLET ORAL at 04:17

## 2025-05-10 RX ADMIN — MORPHINE SULFATE 1 MG: 2 INJECTION, SOLUTION INTRAMUSCULAR; INTRAVENOUS at 12:20

## 2025-05-10 RX ADMIN — AMLODIPINE BESYLATE 10 MG: 10 TABLET ORAL at 08:23

## 2025-05-10 RX ADMIN — MORPHINE SULFATE 1 MG: 2 INJECTION, SOLUTION INTRAMUSCULAR; INTRAVENOUS at 07:03

## 2025-05-10 RX ADMIN — Medication 10 ML: at 21:21

## 2025-05-10 RX ADMIN — OXYCODONE 10 MG: 5 TABLET ORAL at 16:05

## 2025-05-10 RX ADMIN — LEVOTHYROXINE SODIUM 25 MCG: 0.03 TABLET ORAL at 05:52

## 2025-05-10 RX ADMIN — MORPHINE SULFATE 1 MG: 2 INJECTION, SOLUTION INTRAMUSCULAR; INTRAVENOUS at 18:22

## 2025-05-10 RX ADMIN — CEFTRIAXONE SODIUM 2000 MG: 2 INJECTION, POWDER, FOR SOLUTION INTRAMUSCULAR; INTRAVENOUS at 10:42

## 2025-05-10 RX ADMIN — HYDRALAZINE HYDROCHLORIDE 25 MG: 25 TABLET ORAL at 08:23

## 2025-05-10 RX ADMIN — Medication 10 ML: at 08:24

## 2025-05-10 RX ADMIN — OXYCODONE 10 MG: 5 TABLET ORAL at 08:47

## 2025-05-10 RX ADMIN — HYDRALAZINE HYDROCHLORIDE 25 MG: 25 TABLET ORAL at 21:20

## 2025-05-10 RX ADMIN — MORPHINE SULFATE 1 MG: 2 INJECTION, SOLUTION INTRAMUSCULAR; INTRAVENOUS at 01:43

## 2025-05-10 RX ADMIN — SENNOSIDES AND DOCUSATE SODIUM 2 TABLET: 50; 8.6 TABLET ORAL at 21:21

## 2025-05-10 RX ADMIN — DIPHENHYDRAMINE HYDROCHLORIDE 25 MG: 50 INJECTION, SOLUTION INTRAMUSCULAR; INTRAVENOUS at 21:21

## 2025-05-10 RX ADMIN — HYDROCORTISONE ACETATE 1 APPLICATION: 1 CREAM TOPICAL at 21:24

## 2025-05-10 RX ADMIN — OXYCODONE 10 MG: 5 TABLET ORAL at 21:20

## 2025-05-10 NOTE — PLAN OF CARE
Goal Outcome Evaluation:  Plan of Care Reviewed With: patient        Progress: improving  Outcome Evaluation: Patient pleasant and compliant with care. Patient's pain treated per MAR. Patient worked with PT today. Patient alert and able to make needs known. Call light in reach and bed in lowest locked position. Patient has had no additional complaints so far.

## 2025-05-10 NOTE — PLAN OF CARE
Goal Outcome Evaluation:   Plan of care reviewed with: patient  Progress: improving  Outcome evaluation: Patient a/o x4. Surgical dressing and splint in place to right ankle. Pain medication given x3 with relief. Room air. No acute distress noted.

## 2025-05-10 NOTE — THERAPY TREATMENT NOTE
Acute Care - Physical Therapy Treatment Note  OCHOA Olson     Patient Name: Davidson Lyle  : 1970  MRN: [id number]  Today's Date: 5/10/2025      Visit Dx:     ICD-10-CM ICD-9-CM   1. Closed trimalleolar fracture of right ankle, initial encounter  S82.851A 824.6   2. Altered mental status, unspecified altered mental status type  R41.82 780.97   3. Decreased activities of daily living (ADL)  Z78.9 V49.89   4. Difficulty walking  R26.2 719.7     Patient Active Problem List   Diagnosis    HAIM (acute kidney injury)    Essential hypertension    Chronic pain syndrome    Obesity    Hyponatremia    Acute kidney injury (HAIM) with acute tubular necrosis (ATN)    Hypotension     Past Medical History:   Diagnosis Date    Depression     Disease of thyroid gland     Elevated cholesterol     GERD (gastroesophageal reflux disease)     Hypertension     PONV (postoperative nausea and vomiting)     PTSD (post-traumatic stress disorder)     Von Willebrand disease      Past Surgical History:   Procedure Laterality Date    BACK SURGERY      COSMETIC SURGERY       PT Assessment (Last 12 Hours)       PT Evaluation and Treatment       Row Name 05/10/25 1414          Physical Therapy Time and Intention    Subjective Information complains of  Itching, pt has notable rash on back, nursing made aware and came to look at patients back.  -VK     Document Type therapy note (daily note)  -VK     Mode of Treatment individual therapy;physical therapy  -VK     Patient Effort good  -VK       Row Name 05/10/25 1414          General Information    Patient Profile Reviewed yes  -VK     Existing Precautions/Restrictions fall;weight bearing  TTWB RLE  -VK       Row Name 05/10/25 1414          Cognition    Affect/Mental Status (Cognition) WFL;anxious  -VK     Orientation Status (Cognition) oriented x 3  -VK     Personal Safety Interventions nonskid shoes/slippers when out of bed;gait belt;fall prevention program maintained  -VK       Row Name  05/10/25 1414          Mobility    Extremity Weight-bearing Status right lower extremity  -     Right Lower Extremity (Weight-bearing Status) toe touch weight-bearing (TTWB)  -       Row Name 05/10/25 1414          Bed Mobility    Supine-Sit Charles (Bed Mobility) moderate assist (50% patient effort);verbal cues  -VK     Sit-Supine Charles (Bed Mobility) moderate assist (50% patient effort);verbal cues  -VK     Bed Mobility, Safety Issues decreased use of arms for pushing/pulling;decreased use of legs for bridging/pushing  -     Assistive Device (Bed Mobility) bed rails;head of bed elevated  -       Row Name 05/10/25 1414          Transfers    Transfers sit-stand transfer;stand-sit transfer;stand pivot/stand step transfer;toilet transfer  -       Row Name 05/10/25 1414          Sit-Stand Transfer    Sit-Stand Charles (Transfers) minimum assist (75% patient effort);verbal cues  -VK     Assistive Device (Sit-Stand Transfers) walker, knee scooter  -       Row Name 05/10/25 1414          Stand-Sit Transfer    Stand-Sit Charles (Transfers) minimum assist (75% patient effort);verbal cues  -VK     Assistive Device (Stand-Sit Transfers) walker, knee scooter  -       Row Name 05/10/25 1414          Stand Pivot/Stand Step Transfer    Stand Pivot/Stand Step Charles (Transfers) minimum assist (75% patient effort);moderate assist (50% patient effort);verbal cues  -VK     Assistive Device (Stand Pivot Stand Step Transfer) walker, knee scooter  -       Row Name 05/10/25 1414          Toilet Transfer    Type (Toilet Transfer) sit-stand;stand-sit;stand pivot/stand step  -     Charles Level (Toilet Transfer) minimum assist (75% patient effort);moderate assist (50% patient effort);verbal cues  -VK     Assistive Device (Toilet Transfer) walker, knee scooter  -       Row Name 05/10/25 1414          Gait/Stairs (Locomotion)    Charles Level (Gait) minimum assist (75% patient  effort);verbal cues  -VK     Assistive Device (Gait) walker, knee scooter  -VK     Patient was able to Ambulate yes  -VK     Distance in Feet (Gait) 40  -VK     Pattern (Gait) swing-through  -VK     Deviations/Abnormal Patterns (Gait) antalgic;kayley decreased;gait speed decreased;stride length decreased  -VK     Bilateral Gait Deviations heel strike decreased  -VK     Right Sided Gait Deviations weight shift ability decreased  -VK       Row Name 05/10/25 1414          Safety Issues/Impairments Affecting Functional Mobility    Safety Issues Affecting Function (Mobility) impulsivity  -VK     Impairments Affecting Function (Mobility) balance;endurance/activity tolerance;pain  -VK       Row Name 05/10/25 1414          Balance    Dynamic Standing Balance minimal assist;moderate assist  -VK     Position/Device Used, Standing Balance walker, front-wheeled  -VK     Balance Interventions sit to stand  -VK       Row Name             Wound 05/08/25 1435 Right distal leg Surgical    Wound - Properties Group Placement Date: 05/08/25  -TM Placement Time: 1435  -TM Side: Right  -TM Orientation: distal  -TM Location: leg  -TM Primary Wound Type: Surgical  -TM    Retired Wound - Properties Group Placement Date: 05/08/25  -TM Placement Time: 1435  -TM Side: Right  -TM Orientation: distal  -TM Location: leg  -TM    Retired Wound - Properties Group Placement Date: 05/08/25  -TM Placement Time: 1435  -TM Side: Right  -TM Orientation: distal  -TM Location: leg  -TM    Retired Wound - Properties Group Date first assessed: 05/08/25  -TM Time first assessed: 1435  -TM Side: Right  -TM Location: leg  -TM      Row Name 05/10/25 1414          Positioning and Restraints    Pre-Treatment Position in bed  -VK     Post Treatment Position bed  -VK     In Bed fowlers;call light within reach;encouraged to call for assist;exit alarm on;with family/caregiver  -VK       Row Name 05/10/25 1414          Progress Summary (PT)    Progress Toward  Functional Goals (PT) progress toward functional goals is good  -VK               User Key  (r) = Recorded By, (t) = Taken By, (c) = Cosigned By      Initials Name Provider Type    TM Violeta Lewis, RN Registered Nurse    Ina Ortiz PTA Physical Therapist Assistant                    Physical Therapy Education       Title: PT OT SLP Therapies (In Progress)       Topic: Physical Therapy (In Progress)       Point: Mobility training (Done)       Learning Progress Summary            Patient Acceptance, E,TB, VU by AV at 5/9/2025 1507                      Point: Home exercise program (Not Started)       Learner Progress:  Not documented in this visit.              Point: Body mechanics (Done)       Learning Progress Summary            Patient Acceptance, E,TB, VU by AV at 5/9/2025 1507                      Point: Precautions (Done)       Learning Progress Summary            Patient Acceptance, E,TB, VU by AV at 5/9/2025 1507                                      User Key       Initials Effective Dates Name Provider Type Discipline    AV 06/11/21 -  Earl Velasquez, PT Physical Therapist PT                  PT Recommendation and Plan     Progress Summary (PT)  Progress Toward Functional Goals (PT): progress toward functional goals is good   Outcome Measures       Row Name 05/10/25 1400 05/09/25 1500          How much help from another person do you currently need...    Turning from your back to your side while in flat bed without using bedrails? 3  -VK 3  -AV     Moving from lying on back to sitting on the side of a flat bed without bedrails? 2  -VK 2  -AV     Moving to and from a bed to a chair (including a wheelchair)? 3  -VK 3  -AV     Standing up from a chair using your arms (e.g., wheelchair, bedside chair)? 3  -VK 3  -AV     Climbing 3-5 steps with a railing? 2  -VK 2  -AV     To walk in hospital room? 2  -VK 2  -AV     AM-PAC 6 Clicks Score (PT) 15  -VK 15  -AV        Functional Assessment    Outcome  Measure Options -- AM-PAC 6 Clicks Basic Mobility (PT)  -AV               User Key  (r) = Recorded By, (t) = Taken By, (c) = Cosigned By      Initials Name Provider Type    Earl Dunne, PT Physical Therapist    Ina Ortiz PTA Physical Therapist Assistant                     Time Calculation:    PT Charges       Row Name 05/10/25 1446             Time Calculation    PT Received On 05/10/25  -VK         Timed Charges    37294 - Gait Training Minutes  10  -VK      14840 - PT Therapeutic Activity Minutes 16  -VK         Total Minutes    Timed Charges Total Minutes 26  -VK       Total Minutes 26  -VK                User Key  (r) = Recorded By, (t) = Taken By, (c) = Cosigned By      Initials Name Provider Type    Ina Ortiz PTA Physical Therapist Assistant                  Therapy Charges for Today       Code Description Service Date Service Provider Modifiers Qty    24220853217 HC GAIT TRAINING EA 15 MIN 5/10/2025 Ina Murillo PTA GP 1    70217648948 HC PT THERAPEUTIC ACT EA 15 MIN 5/10/2025 Ina Murillo PTA GP 1            PT G-Codes  Outcome Measure Options: AM-PAC 6 Clicks Basic Mobility (PT)  AM-PAC 6 Clicks Score (PT): 15  AM-PAC 6 Clicks Score (OT): 17    Ina Murillo PTA  5/10/2025

## 2025-05-10 NOTE — PROGRESS NOTES
[facility]   Hospitalist Progress Note  Date: 5/10/2025  Patient Name: Davidson Lyle  : 1970  MRN: [id number]  Date of admission: 2025      Subjective   Subjective     Chief Complaint: Fall    Summary: Davidson Lyle is a 54 y.o. female with past medical history of hypothyroidism and hypertension presents to the ED due to fall.  Patient states she woke up in the [place] to go to the restroom and fell forward.  Patient denies hitting her head.  When EMS arrived patient was found somnolent.  Patient takes many meds at night that can make her drowsy.  Denies chest pain, shortness of breath, abdominal pain, nausea, vomit, diarrhea or fever.  In the ED, patient's vitals show heart rate 58, temperature 97.6, blood pressure 79/43 and satting 100% room air.  Labs show troponin 10, sodium 139, creatinine 1.9, bicarb 18, TSH 26.8, white blood cell 8.6 and hemoglobin 14.5.  Chest x-ray unremarkable.  Ankle x-ray shows acute trimalleolar fracture.  Orthopedic consulted and will evaluate in the morning.  Patient admitted to floors for further management.     Interval Followup: Patient seen and examined resting comfortably blood pressures remain elevated but improved pain control likely home tomorrow    Objective   Objective     Vitals:   Temp:  [97.9 °F (36.6 °C)-99 °F (37.2 °C)] 98.1 °F (36.7 °C)  Heart Rate:  [] 96  Resp:  [17-20] 20  BP: (158-176)/(70-88) 158/78    Physical Exam   Constitutional: A wake alert no distress  Respiratory: Clear  Cardiovascular RRR  GI: Abdomen soft nontender      Result Review    Result Review:  I have reviewed the following  [x]  Laboratory  CBC          2025    00:49 2025    04:50 2025    04:30 2025    04:27   CBC   WBC 8.66  8.46  7.89  13.61    RBC 4.87  4.41  4.23  4.50    Hemoglobin 14.5  13.3  12.6  13.4    Hematocrit 44.6  41.2  38.3  40.2    MCV 91.6  93.4  90.5  89.3    MCH 29.8  30.2  29.8  29.8    MCHC 32.5  32.3  32.9  33.3     RDW 14.5  14.4  14.5  14.2    Platelets 249  166  176  208      CMP          5/7/2025    00:49 5/7/2025    04:50 5/8/2025    04:30 5/9/2025    04:27   CMP   Glucose 134  96  142  135    BUN 17  17  14  15    Creatinine 1.90  1.59  1.36  1.21    EGFR 30.7  38.0  45.8  52.7    Sodium 139  141  137  139    Potassium 3.7  4.0  3.7  4.0    Chloride 102  106  102  103    Calcium 9.5  8.6  8.9  10.2    Total Protein 8.2    8.3    Albumin 4.5   3.9  4.2    Globulin 3.7       Total Bilirubin 0.5    0.3    Alkaline Phosphatase 113    106    AST (SGOT) 20    19    ALT (SGPT) 16    14    Albumin/Globulin Ratio 1.2       BUN/Creatinine Ratio 8.9  10.7  10.3  12.4    Anion Gap 18.2  11.9  13.0  13.3        []  Microbiology  []  Radiology  []  EKG/Telemetry   []  Cardiology/Vascular   []  Pathology  []  Old records  []  Other:    Assessment & Plan   Assessment / Plan   Assessment:    Hypotension resolved  UTI  Mechanical fall  Right ankle fracture secondary to the above  Bradycardia resolved  CKD  Hypothyroidism  CVA  CAD  GERD  Von Willebrand disease    Plan:    Continue antibiotics    Continue to titrate antihypertensives for better blood pressure control status post ORIF 5/8/2025  Pain control with combination of IV morphine and oral oxycodone  Checking 2D echo  Checking thyroid function TSH elevated at 26 continue with Synthroid will need to see if this was recently started or if she has had a recent dose change  Labs in a.m.  Trying to figure out accurate home medications  PT/OT  Would likely benefit from rehab but patient is refusing  Likely home tomorrow if blood pressures better  Discussed plan with RN.    VTE Prophylaxis:  Mechanical VTE prophylaxis orders are present.        CODE STATUS:   Code Status (Patient has no pulse and is not breathing): CPR (Attempt to Resuscitate)  Medical Interventions (Patient has pulse or is breathing): Full Support  Electronically signed by RAFFI Avila, 05/10/25, 1:19 PM  EDT.      Attending Documentation:  Patient independently seen and evaluated, above documentation reflects plan put forth during bedside rounds.  More than 51% of the time of this patient encounter was performed by me. I discussed the care plan with TAMIKO Atkins PA-C, I agree with his findings and plan as documented, what I have added to the care plan and modified is as follows in my documentation and my medical decision making; 54-year-old female came in with hypotension, fall, broke her ankle, got that fixed.  Doing better today, resumed blood pressure medication with Norvasc.  Would benefit from rehab but she is adamantly against going, likely can discharge home tomorrow.  Electronically signed by Ron Hartley MD, 05/10/25, 6:09 PM EDT.

## 2025-05-11 LAB
ALBUMIN SERPL-MCNC: 4 G/DL (ref 3.5–5.2)
ANION GAP SERPL CALCULATED.3IONS-SCNC: 13.1 MMOL/L (ref 5–15)
BASOPHILS # BLD AUTO: 0.05 10*3/MM3 (ref 0–0.2)
BASOPHILS NFR BLD AUTO: 0.6 % (ref 0–1.5)
BUN SERPL-MCNC: 18 MG/DL (ref 6–20)
BUN/CREAT SERPL: 16.7 (ref 7–25)
CALCIUM SPEC-SCNC: 9.7 MG/DL (ref 8.6–10.5)
CHLORIDE SERPL-SCNC: 100 MMOL/L (ref 98–107)
CO2 SERPL-SCNC: 22.9 MMOL/L (ref 22–29)
CREAT SERPL-MCNC: 1.08 MG/DL (ref 0.57–1)
DEPRECATED RDW RBC AUTO: 47.1 FL (ref 37–54)
EGFRCR SERPLBLD CKD-EPI 2021: 60.4 ML/MIN/1.73
EOSINOPHIL # BLD AUTO: 0.2 10*3/MM3 (ref 0–0.4)
EOSINOPHIL NFR BLD AUTO: 2.3 % (ref 0.3–6.2)
ERYTHROCYTE [DISTWIDTH] IN BLOOD BY AUTOMATED COUNT: 14.4 % (ref 12.3–15.4)
GLUCOSE SERPL-MCNC: 118 MG/DL (ref 65–99)
HCT VFR BLD AUTO: 36.1 % (ref 34–46.6)
HGB BLD-MCNC: 12 G/DL (ref 12–15.9)
IMM GRANULOCYTES # BLD AUTO: 0.03 10*3/MM3 (ref 0–0.05)
IMM GRANULOCYTES NFR BLD AUTO: 0.4 % (ref 0–0.5)
LYMPHOCYTES # BLD AUTO: 3 10*3/MM3 (ref 0.7–3.1)
LYMPHOCYTES NFR BLD AUTO: 35.1 % (ref 19.6–45.3)
MAGNESIUM SERPL-MCNC: 1.9 MG/DL (ref 1.6–2.6)
MCH RBC QN AUTO: 29.7 PG (ref 26.6–33)
MCHC RBC AUTO-ENTMCNC: 33.2 G/DL (ref 31.5–35.7)
MCV RBC AUTO: 89.4 FL (ref 79–97)
MONOCYTES # BLD AUTO: 0.78 10*3/MM3 (ref 0.1–0.9)
MONOCYTES NFR BLD AUTO: 9.1 % (ref 5–12)
NEUTROPHILS NFR BLD AUTO: 4.48 10*3/MM3 (ref 1.7–7)
NEUTROPHILS NFR BLD AUTO: 52.5 % (ref 42.7–76)
NRBC BLD AUTO-RTO: 0 /100 WBC (ref 0–0.2)
PHOSPHATE SERPL-MCNC: 3.5 MG/DL (ref 2.5–4.5)
PLATELET # BLD AUTO: 218 10*3/MM3 (ref 140–450)
PMV BLD AUTO: 10.4 FL (ref 6–12)
POTASSIUM SERPL-SCNC: 3.5 MMOL/L (ref 3.5–5.2)
RBC # BLD AUTO: 4.04 10*6/MM3 (ref 3.77–5.28)
SODIUM SERPL-SCNC: 136 MMOL/L (ref 136–145)
WBC NRBC COR # BLD AUTO: 8.54 10*3/MM3 (ref 3.4–10.8)

## 2025-05-11 PROCEDURE — 63710000001 DIPHENHYDRAMINE PER 50 MG: Performed by: PHYSICIAN ASSISTANT

## 2025-05-11 PROCEDURE — 25010000002 MORPHINE PER 10 MG: Performed by: ORTHOPAEDIC SURGERY

## 2025-05-11 PROCEDURE — 83735 ASSAY OF MAGNESIUM: CPT | Performed by: PHYSICIAN ASSISTANT

## 2025-05-11 PROCEDURE — 97110 THERAPEUTIC EXERCISES: CPT

## 2025-05-11 PROCEDURE — 97530 THERAPEUTIC ACTIVITIES: CPT

## 2025-05-11 PROCEDURE — 85025 COMPLETE CBC W/AUTO DIFF WBC: CPT | Performed by: PHYSICIAN ASSISTANT

## 2025-05-11 PROCEDURE — 80069 RENAL FUNCTION PANEL: CPT | Performed by: PHYSICIAN ASSISTANT

## 2025-05-11 PROCEDURE — 99239 HOSP IP/OBS DSCHRG MGMT >30: CPT | Performed by: INTERNAL MEDICINE

## 2025-05-11 RX ORDER — AMLODIPINE BESYLATE 10 MG/1
10 TABLET ORAL
Qty: 30 TABLET | Refills: 0 | Status: SHIPPED | OUTPATIENT
Start: 2025-05-12 | End: 2025-06-11

## 2025-05-11 RX ADMIN — HYDRALAZINE HYDROCHLORIDE 25 MG: 25 TABLET ORAL at 20:38

## 2025-05-11 RX ADMIN — OXYCODONE 10 MG: 5 TABLET ORAL at 20:38

## 2025-05-11 RX ADMIN — Medication 10 ML: at 08:44

## 2025-05-11 RX ADMIN — Medication 10 ML: at 20:38

## 2025-05-11 RX ADMIN — LEVOTHYROXINE SODIUM 25 MCG: 0.03 TABLET ORAL at 05:33

## 2025-05-11 RX ADMIN — OXYCODONE 10 MG: 5 TABLET ORAL at 11:39

## 2025-05-11 RX ADMIN — SENNOSIDES AND DOCUSATE SODIUM 2 TABLET: 50; 8.6 TABLET ORAL at 20:38

## 2025-05-11 RX ADMIN — OXYCODONE 10 MG: 5 TABLET ORAL at 05:32

## 2025-05-11 RX ADMIN — HYDRALAZINE HYDROCHLORIDE 25 MG: 25 TABLET ORAL at 08:44

## 2025-05-11 RX ADMIN — MORPHINE SULFATE 1 MG: 2 INJECTION, SOLUTION INTRAMUSCULAR; INTRAVENOUS at 02:23

## 2025-05-11 RX ADMIN — AMLODIPINE BESYLATE 10 MG: 10 TABLET ORAL at 08:44

## 2025-05-11 RX ADMIN — DIPHENHYDRAMINE HYDROCHLORIDE 25 MG: 25 CAPSULE ORAL at 17:59

## 2025-05-11 RX ADMIN — OXYCODONE 10 MG: 5 TABLET ORAL at 15:50

## 2025-05-11 RX ADMIN — HYDROCORTISONE ACETATE 1 APPLICATION: 1 CREAM TOPICAL at 20:39

## 2025-05-11 NOTE — DISCHARGE SUMMARY
[facility]         HOSPITALIST  DISCHARGE SUMMARY    Patient Name: Davidson Lyle  : 1970  MRN: [id number]    Date of Admission: 2025  Date of Discharge: 2025  Primary Care Physician: Madi Monson MD  Reason for admission:  Fall with ankle pain    Final diagnosis:  Right ankle fracture status post open reduction internal fixation Dr. Peñaloza 2025  Mechanical fall resulting in the above  Hypotension resolved  Bradycardia resolved  Possible UTI completed 5 days of antibiotics  CKD  Hypothyroidism  History of CVA  CAD  GERD  Von Willebrand disease  Consults       Date and Time Order Name Status Description    2025  2:13 AM Inpatient Hospitalist Consult      2025  2:10 AM IP General Consult (Use specialty-specific consult if known)              Active and Resolved Hospital Problems:  Active Hospital Problems    Diagnosis POA    **Hypotension [I95.9] Yes      Resolved Hospital Problems   No resolved problems to display.       Hospital Course     Hospital Course:  Davidson Lyle is a 54 y.o. female multiple medical problems as documented above who presents to the emergency department secondary to ankle pain after mechanical fall evaluated in the emergency department was noted to have right ankle fracture additionally was noted to be hypotensive and bradycardic with blood pressure of 79/43 and heart rate in the 40s requiring aggressive resuscitation and atropine admitted to the hospitalist service orthopedic surgery consulted blood pressures and heart rate improved patient taken to the operating room and underwent successful open reduction internal fixation of her right ankle fracture brought back to the floor blood pressures trended up requiring antihypertensives these will require further titration in the outpatient setting with PCP.  Patient offered inpatient rehab which she would likely benefit from but is adamantly refusing this.  She will be discharged in  stable condition with orders to follow-up with her PCP 3 to 5 days follow-up with orthopedic surgery next 5 to 7 days.  Patient continue toe-touch weightbearing per orthopedic surgery recommendations continue to keep affected extremity elevated when not in use to help with swelling.  Patient should return to ED immediately for any worsening symptoms.        DISCHARGE Follow Up Recommendations for labs and diagnostics: As above      Day of Discharge     Vital Signs:  Temp:  [97.7 °F (36.5 °C)-99.3 °F (37.4 °C)] 97.9 °F (36.6 °C)  Heart Rate:  [] 93  Resp:  [17-19] 17  BP: (132-150)/(65-93) 142/82  Physical Exam:   Constitutional: Awake alert no acute distress  Respiratory: Clear  Cardiovascular RRR  GI: Ab soft nontender  Right lower extremity heavily splinted and wrapped      Discharge Details        Discharge Medications        Continue These Medications        Instructions Start Date   amitriptyline 10 MG tablet  Commonly known as: ELAVIL   10 mg, Nightly PRN      cyanocobalamin 1000 MCG/ML injection   1 mL, Every 14 Days      famotidine 20 MG tablet  Commonly known as: PEPCID   20 mg, Oral, 2 Times Daily      levothyroxine 25 MCG tablet  Commonly known as: SYNTHROID, LEVOTHROID   1 tablet, Daily      tiZANidine 2 MG tablet  Commonly known as: ZANAFLEX   2 mg, Every 6 Hours             ASK your doctor about these medications        Instructions Start Date   aspirin 81 MG chewable tablet   81 mg, Daily      bismuth subsalicylate 262 MG/15ML suspension  Commonly known as: PEPTO BISMOL   15 mL, Every 6 Hours PRN      Cholecalciferol 50 MCG (2000 UT) capsule   2,000 Units, Daily      clopidogrel 75 MG tablet  Commonly known as: PLAVIX   75 mg, Daily      gabapentin 800 MG tablet  Commonly known as: NEURONTIN  Ask about: Which instructions should I use?   800 mg, Every 6 to 8 Hours PRN      hydrOXYzine 50 MG tablet  Commonly known as: ATARAX   1.5-2 tablets, Nightly PRN      oxyCODONE 5 MG immediate release  tablet  Commonly known as: ROXICODONE   5 mg, Every 4 to 6 Hours PRN      zolpidem 5 MG tablet  Commonly known as: AMBIEN  Ask about: Which instructions should I use?   5 mg, Oral, Nightly               Allergies   Allergen Reactions    Duloxetine Hcl Hallucinations    Baclofen Unknown - Low Severity    Metaxalone Unknown - Low Severity    Sulfa Antibiotics Unknown - Low Severity    Latex Rash       Discharge Disposition:      Diet:  Hospital:  Diet Order   Procedures    Diet: Regular/House, Cardiac; Healthy Heart (2-3 Na+); Fluid Consistency: Thin (IDDSI 0)       Discharge Activity:       CODE STATUS:  Code Status and Medical Interventions: CPR (Attempt to Resuscitate); Full Support   Ordered at: 05/07/25 0248     Code Status (Patient has no pulse and is not breathing):    CPR (Attempt to Resuscitate)     Medical Interventions (Patient has pulse or is breathing):    Full Support         Future Appointments   Date Time Provider Department Center   5/21/2025  7:45 AM Dominga Palma APRN [facility]           Pertinent  and/or Most Recent Results     PROCEDURES:   Right ankle ORIF    LAB RESULTS:      Lab 05/11/25 0320 05/09/25 0427 05/08/25 0430 05/07/25 0736 05/07/25 0450 05/07/25  0049   WBC 8.54 13.61* 7.89  --  8.46 8.66   HEMOGLOBIN 12.0 13.4 12.6  --  13.3 14.5   HEMATOCRIT 36.1 40.2 38.3  --  41.2 44.6   PLATELETS 218 208 176  --  166 249   NEUTROS ABS 4.48 11.12* 4.74  --  6.85 7.11*   IMMATURE GRANS (ABS) 0.03 0.06* 0.02  --  0.03 0.03   LYMPHS ABS 3.00 1.50 2.34  --  0.90 1.02   MONOS ABS 0.78 0.91* 0.66  --  0.61 0.36   EOS ABS 0.20 0.00 0.10  --  0.03 0.09   MCV 89.4 89.3 90.5  --  93.4 91.6   LACTATE  --   --   --  1.3 2.3*  --          Lab 05/11/25 0320 05/09/25 0427 05/08/25 0430 05/07/25 0450 05/07/25  0049   SODIUM 136 139 137 141 139   POTASSIUM 3.5 4.0 3.7 4.0 3.7   CHLORIDE 100 103 102 106 102   CO2 22.9 22.7 22.0 23.1 18.8*   ANION GAP 13.1 13.3 13.0 11.9 18.2*   BUN 18 15 14 17  17   CREATININE 1.08* 1.21* 1.36* 1.59* 1.90*   EGFR 60.4 52.7* 45.8* 38.0* 30.7*   GLUCOSE 118* 135* 142* 96 134*   CALCIUM 9.7 10.2 8.9 8.6 9.5   MAGNESIUM 1.9 1.9 1.8  --   --    PHOSPHORUS 3.5 3.2 3.4  --   --    HEMOGLOBIN A1C  --   --   --   --  6.10*   TSH  --   --   --   --  26.860*         Lab 05/11/25  0320 05/09/25  0427 05/08/25  0430 05/07/25  0049   TOTAL PROTEIN  --  8.3  --  8.2   ALBUMIN 4.0 4.2 3.9 4.5   GLOBULIN  --   --   --  3.7   ALT (SGPT)  --  14  --  16   AST (SGOT)  --  19  --  20   BILIRUBIN  --  0.3  --  0.5   INDIRECT BILIRUBIN  --  0.2  --   --    BILIRUBIN DIRECT  --  0.1  --   --    ALK PHOS  --  106  --  113         Lab 05/07/25  0450 05/07/25  0049   PROBNP  --  382.8   HSTROP T 7 10         Lab 05/07/25  0450   CHOLESTEROL 216*   LDL CHOL 143*   HDL CHOL 54   TRIGLYCERIDES 107             Brief Urine Lab Results  (Last result in the past 365 days)        Color   Clarity   Blood   Leuk Est   Nitrite   Protein   CREAT   Urine HCG        05/07/25 0236             51.2         05/07/25 0236 Yellow   Clear   Trace   Negative   Negative   100 mg/dL (2+)                 Microbiology Results (last 10 days)       Procedure Component Value - Date/Time    Urine Culture - Urine, Straight Cath [[id number]]  (Normal) Collected: 05/07/25 0236    Lab Status: Final result Specimen: Urine from Straight Cath Updated: 05/08/25 1116     Urine Culture No growth    Blood Culture - Blood, Arm, Right [[id number]]  (Normal) Collected: 05/07/25 0202    Lab Status: Preliminary result Specimen: Blood from Arm, Right Updated: 05/11/25 0215     Blood Culture No growth at 4 days    Narrative:      Less than seven (7) mL's of blood was collected.  Insufficient quantity may yield false negative results.    Blood Culture - Blood, Arm, Left [[id number]]  (Normal) Collected: 05/07/25 0202    Lab Status: Preliminary result Specimen: Blood from Arm, Left Updated: 05/11/25 0215     Blood Culture No growth at 4 days     Narrative:      Less than seven (7) mL's of blood was collected.  Insufficient quantity may yield false negative results.            CT Head Without Contrast  Result Date: 5/7/2025  Impression: No acute brain abnormality is appreciated. No acute infarct. No acute intracranial hemorrhage. No acute skull fracture.    Portions of this note were completed with a voice recognition program. Electronically Signed: Jarred Farley MD  5/7/2025 1:59 AM EDT  Workstation ID: [id number]    XR Ankle 2 View Right  Result Date: 5/7/2025  Impression: Interval improvement in alignment is noted after closed reduction of the right ankle-fracture dislocation.   Portions of this note were completed with a voice recognition program.  5/7/2025 1:42 AM by Jarred Farley MD on Workstation: [employer]      XR Chest 1 View  Result Date: 5/7/2025  Impression: No acute infiltrate is appreciated. No cardiac enlargement. No pneumothorax.    Portions of this note were completed with a voice recognition program.  5/7/2025 1:41 AM by Jarred Farley MD on Workstation: [employer]      XR Ankle 3+ View Right  Result Date: 5/7/2025  Impression: There is an acute trimalleolar fracture-dislocation involving the right ankle, as discussed.   Portions of this note were completed with a voice recognition program.  5/7/2025 1:11 AM by Jarred Farley MD on Workstation: [employer]                Results for orders placed during the hospital encounter of 05/07/25    Adult Transthoracic Echo Complete W/ Cont if Necessary Per Protocol    Interpretation Summary    Left ventricular ejection fraction appears to be 56 - 60%.    Left ventricular diastolic function is consistent with (grade I) impaired relaxation and age.    Estimated right ventricular systolic pressure from tricuspid regurgitation is normal (<35 mmHg).    No significant valvular disease.      Labs Pending at Discharge:  Pending Labs       Order Current Status    Blood Culture - Blood, Arm, Left Preliminary result     Blood Culture - Blood, Arm, Right Preliminary result              Time spent on Discharge including face to face service: 35 minutes    Electronically signed by RAFFI Avila, 05/11/25, 11:02 AM EDT.    Attending Documentation:  Patient independently seen and evaluated, above documentation reflects plan put forth during bedside rounds.  More than 51% of the time of this patient encounter was performed by me. I discussed the care plan with TAMIKO Atkins PA-C, I agree with his findings and plan as documented, what I have added to the care plan and modified is as follows in my documentation and my medical decision making; 54-year-old female came in with mechanical fall resulting in ankle fracture for which she got an ORIF on 5/8/2025.  Also had hypotension and bradycardia, possible UTI.  She has done well postoperatively, blood pressure is improved.  She had had a period of time during which she did not take her blood pressure medications but is back on her regimen.  Discharge home today.  She sees Dr. Madi Monson for primary care, have advised that she call tomorrow to get a close hospital follow-up appointment.  Patient is to also follow-up with Dr. Simmons.  Patient advised to keep her leg elevated when she is at rest.  Rehab was recommended but patient declined this daily to pursue this so she will discharge home.  Electronically signed by Ron Hartley MD, 05/11/25, 12:30 PM EDT.

## 2025-05-11 NOTE — PLAN OF CARE
Goal Outcome Evaluation:   Outcome evaluation: Patient is alert and oriented x4 and very pleasant. Benadryl given per MD order for raised, red rash on back and left arm. Was effective, patient has stopped scratching/itching. Pain medication requested for right ankle fx, see MAR. No acute findings. VSS.

## 2025-05-11 NOTE — THERAPY TREATMENT NOTE
Acute Care - Physical Therapy Treatment Note  OCHOA [facility]     Patient Name: Davidson Lyle  : 1970  MRN: [id number]  Today's Date: 2025      Visit Dx:     ICD-10-CM ICD-9-CM   1. Closed trimalleolar fracture of right ankle, initial encounter  S82.851A 824.6   2. Altered mental status, unspecified altered mental status type  R41.82 780.97   3. Decreased activities of daily living (ADL)  Z78.9 V49.89   4. Difficulty walking  R26.2 719.7     Patient Active Problem List   Diagnosis    HAIM (acute kidney injury)    Essential hypertension    Chronic pain syndrome    Obesity    Hyponatremia    Acute kidney injury (HAIM) with acute tubular necrosis (ATN)    Hypotension     Past Medical History:   Diagnosis Date    Depression     Disease of thyroid gland     Elevated cholesterol     GERD (gastroesophageal reflux disease)     Hypertension     PONV (postoperative nausea and vomiting)     PTSD (post-traumatic stress disorder)     Von Willebrand disease      Past Surgical History:   Procedure Laterality Date    BACK SURGERY      COSMETIC SURGERY       PT Assessment (Last 12 Hours)       PT Evaluation and Treatment       Row Name 25 1134          Physical Therapy Time and Intention    Subjective Information complains of;weakness;fatigue;pain  -DK     Document Type therapy note (daily note)  -DK     Mode of Treatment individual therapy;physical therapy  -DK     Patient Effort good  -DK     Symptoms Noted During/After Treatment fatigue;increased pain  -DK     Comment Pt with a splint on the right foot.  She is TTWB RLE and was advised in the need for a cast shoe.  One was delivered and pt / RN were instructed in how to miguel / doff.  Pt is to decide if she wants a knee scooter or a rolling walker for home use.  -DK       Row Name 25 1134          Pain    Pretreatment Pain Rating 6/10  -DK     Posttreatment Pain Rating 7/10  -DK     Pain Location foot  -DK     Pain Side/Orientation right;generalized  -DK      Pain Management Interventions exercise or physical activity utilized;nursing notified  -       Row Name 05/11/25 1134          Cognition    Affect/Mental Status (Cognition) WFL  -DK     Orientation Status (Cognition) oriented x 4  -DK     Follows Commands (Cognition) WFL  -DK     Cognitive Function (Cognition) WFL  -DK     Personal Safety Interventions gait belt;nonskid shoes/slippers when out of bed;supervised activity  -       Row Name 05/11/25 1134          Mobility    Extremity Weight-bearing Status right lower extremity  -     Right Lower Extremity (Weight-bearing Status) toe touch weight-bearing (TTWB)  -       Row Name 05/11/25 1134          Bed Mobility    Bed Mobility supine-sit-supine;scooting/bridging  -DK     Scooting/Bridging [place] (Bed Mobility) standby assist  -     Supine-Sit [place] (Bed Mobility) standby assist  -DK     Sit-Supine [place] (Bed Mobility) standby assist  -DK     Supine-Sit-Supine [place] (Bed Mobility) standby assist  -     Assistive Device (Bed Mobility) bed rails  -       Row Name 05/11/25 1134          Transfers    Transfers sit-stand transfer;stand-sit transfer  -       Row Name 05/11/25 1134          Sit-Stand Transfer    Sit-Stand [place] (Transfers) standby assist;contact guard;1 person assist  -     Assistive Device (Sit-Stand Transfers) walker, front-wheeled  -       Row Name 05/11/25 1134          Stand-Sit Transfer    Stand-Sit [place] (Transfers) standby assist;contact guard;1 person assist  -     Assistive Device (Stand-Sit Transfers) walker, front-wheeled  -       Row Name 05/11/25 1134          Stand Pivot/Stand Step Transfer    Stand Pivot/Stand Step [place] (Transfers) standby assist;contact guard;1 person assist  -       Row Name 05/11/25 1134          Gait/Stairs (Locomotion)    Gait/Stairs Locomotion gait/ambulation independence;gait/ambulation assistive device;distance ambulated;gait pattern  -      [place] Level (Gait) standby assist;contact guard;1 person assist  -DK     Assistive Device (Gait) walker, front-wheeled  -DK     Distance in Feet (Gait) 20  -DK     Pattern (Gait) step-to  -DK     Deviations/Abnormal Patterns (Gait) [place] decreased;festinating/shuffling;gait speed decreased;stride length decreased  -DK     Bilateral Gait Deviations forward flexed posture  -DK     Comment, (Gait/Stairs) Pt ambulated on room air with a rolling walker.  She needed a cast shoe (now delivered and given to pt), so extended gait deferred.  Pt returned to bed post treatment.  -       Row Name 05/11/25 1134          Safety Issues/Impairments Affecting Functional Mobility    Safety Issues Affecting Function (Mobility) judgment;safety precaution awareness;awareness of need for assistance  -DK     Impairments Affecting Function (Mobility) balance;endurance/activity tolerance;pain;range of motion (ROM);strength  -       Row Name 05/11/25 1134          Balance    Balance Assessment sitting static balance;sitting dynamic balance;standing static balance;standing dynamic balance  -DK     Static Sitting Balance standby assist  -DK     Dynamic Sitting Balance standby assist  -DK     Position, Sitting Balance unsupported;sitting edge of bed  -DK     Static Standing Balance standby assist;contact guard;1-person assist  -DK     Dynamic Standing Balance standby assist;contact guard;1-person assist  -DK     Position/Device Used, Standing Balance walker, front-wheeled  -DK     Balance Interventions standing;dynamic;tandem gait  -       Row Name 05/11/25 1134          Motor Skills    Motor Skills --  therapeutic exercises  -DK     Coordination WFL  -DK     Therapeutic Exercise hip;knee;ankle  -       Row Name 05/11/25 1134          Hip (Therapeutic Exercise)    Hip (Therapeutic Exercise) AROM (active range of motion)  -DK     Hip AROM (Therapeutic Exercise) bilateral;flexion;extension;aBduction;aDduction;sitting;15  repititions  -       Row Name 05/11/25 1134          Knee (Therapeutic Exercise)    Knee (Therapeutic Exercise) AROM (active range of motion)  -     Knee AROM (Therapeutic Exercise) bilateral;flexion;extension;LAQ (long arc quad);sitting;15 repititions  -       Row Name 05/11/25 1134          Ankle (Therapeutic Exercise)    Ankle (Therapeutic Exercise) AROM (active range of motion)  -     Ankle AROM (Therapeutic Exercise) dorsiflexion;plantarflexion;sitting;left;20 repititions  -       Row Name             Wound 05/08/25 1435 Right distal leg Surgical    Wound - Properties Group Placement Date: 05/08/25  -TM Placement Time: 1435  -TM Side: Right  -TM Orientation: distal  -TM Location: leg  -TM Primary Wound Type: Surgical  -TM    Retired Wound - Properties Group Placement Date: 05/08/25  -TM Placement Time: 1435  -TM Side: Right  -TM Orientation: distal  -TM Location: leg  -TM    Retired Wound - Properties Group Placement Date: 05/08/25  -TM Placement Time: 1435  -TM Side: Right  -TM Orientation: distal  -TM Location: leg  -TM    Retired Wound - Properties Group Date first assessed: 05/08/25  -TM Time first assessed: 1435  -TM Side: Right  -TM Location: leg  -TM      Row Name 05/11/25 1134          Plan of Care Review    Plan of Care Reviewed With patient  -DK     Progress improving  -       Row Name 05/11/25 1134          Positioning and Restraints    Pre-Treatment Position in bed  -DK     Post Treatment Position bed  -DK     In Bed supine;call light within reach;encouraged to call for assist;side rails up x2;RLE elevated  -DK               User Key  (r) = Recorded By, (t) = Taken By, (c) = Cosigned By      Initials Name Provider Type    TM Violeta Lewis RN Registered Nurse    Val Cruz PTA Physical Therapist Assistant                    Physical Therapy Education       Title: PT OT SLP Therapies (In Progress)       Topic: Physical Therapy (In Progress)       Point: Mobility training (Done)        Learning Progress Summary            Patient Acceptance, E,TB, VU by AV at 5/9/2025 1507                      Point: Home exercise program (Not Started)       Learner Progress:  Not documented in this visit.              Point: Body mechanics (Done)       Learning Progress Summary            Patient Acceptance, E,TB, VU by AV at 5/9/2025 1507                      Point: Precautions (Done)       Learning Progress Summary            Patient Acceptance, E,TB, VU by AV at 5/9/2025 1507                                      User Key       Initials Effective Dates Name Provider Type Discipline    AV 06/11/21 -  Earl Velasquez, PT Physical Therapist PT                  PT Recommendation and Plan     Plan of Care Reviewed With: patient  Progress: improving   Outcome Measures       Row Name 05/11/25 1133 05/10/25 1400 05/09/25 1500       How much help from another person do you currently need...    Turning from your back to your side while in flat bed without using bedrails? 4  -DK 3  -VK 3  -AV    Moving from lying on back to sitting on the side of a flat bed without bedrails? 4  -DK 2  -VK 2  -AV    Moving to and from a bed to a chair (including a wheelchair)? 3  -DK 3  -VK 3  -AV    Standing up from a chair using your arms (e.g., wheelchair, bedside chair)? 3  -DK 3  -VK 3  -AV    Climbing 3-5 steps with a railing? 2  -DK 2  -VK 2  -AV    To walk in hospital room? 3  -DK 2  -VK 2  -AV    AM-PAC 6 Clicks Score (PT) 19  -DK 15  -VK 15  -AV       Functional Assessment    Outcome Measure Options AM-PAC 6 Clicks Basic Mobility (PT)  -DK -- AM-PAC 6 Clicks Basic Mobility (PT)  -AV              User Key  (r) = Recorded By, (t) = Taken By, (c) = Cosigned By      Initials Name Provider Type    Val Cruz, LORETTA Physical Therapist Assistant    Earl Dunne, PT Physical Therapist    Ina Ortiz PTA Physical Therapist Assistant                     Time Calculation:    PT Charges       Row Name 05/11/25  1139             Time Calculation    PT Received On 05/11/25  -DK      PT Goal Re-Cert Due Date 05/18/25  -DK         Timed Charges    36944 - PT Therapeutic Exercise Minutes 12  -DK      88800 - Gait Training Minutes  6  -DK      55799 - PT Therapeutic Activity Minutes 10  -DK         Total Minutes    Timed Charges Total Minutes 28  -DK       Total Minutes 28  -DK                User Key  (r) = Recorded By, (t) = Taken By, (c) = Cosigned By      Initials Name Provider Type    Val Cruz PTA Physical Therapist Assistant                  Therapy Charges for Today       Code Description Service Date Service Provider Modifiers Qty    71657973045 HC PT THER PROC EA 15 MIN 5/11/2025 Val Alcaraz PTA GP 1    04504469932 HC PT THERAPEUTIC ACT EA 15 MIN 5/11/2025 Val Alcaraz PTA GP 1            PT G-Codes  Outcome Measure Options: AM-PAC 6 Clicks Basic Mobility (PT)  AM-PAC 6 Clicks Score (PT): 19  AM-PAC 6 Clicks Score (OT): 17    Val Alcaraz PTA  5/11/2025

## 2025-05-12 VITALS
RESPIRATION RATE: 18 BRPM | HEART RATE: 87 BPM | BODY MASS INDEX: 31.07 KG/M2 | WEIGHT: 193.34 LBS | OXYGEN SATURATION: 96 % | SYSTOLIC BLOOD PRESSURE: 129 MMHG | TEMPERATURE: 97.5 F | DIASTOLIC BLOOD PRESSURE: 75 MMHG | HEIGHT: 66 IN

## 2025-05-12 LAB
ALBUMIN SERPL-MCNC: 3.9 G/DL (ref 3.5–5.2)
ANION GAP SERPL CALCULATED.3IONS-SCNC: 13.2 MMOL/L (ref 5–15)
BACTERIA SPEC AEROBE CULT: NORMAL
BACTERIA SPEC AEROBE CULT: NORMAL
BASOPHILS # BLD AUTO: 0.04 10*3/MM3 (ref 0–0.2)
BASOPHILS NFR BLD AUTO: 0.6 % (ref 0–1.5)
BUN SERPL-MCNC: 17 MG/DL (ref 6–20)
BUN/CREAT SERPL: 16.7 (ref 7–25)
CALCIUM SPEC-SCNC: 9.6 MG/DL (ref 8.6–10.5)
CHLORIDE SERPL-SCNC: 100 MMOL/L (ref 98–107)
CO2 SERPL-SCNC: 22.8 MMOL/L (ref 22–29)
CREAT SERPL-MCNC: 1.02 MG/DL (ref 0.57–1)
DEPRECATED RDW RBC AUTO: 48.2 FL (ref 37–54)
EGFRCR SERPLBLD CKD-EPI 2021: 64.7 ML/MIN/1.73
EOSINOPHIL # BLD AUTO: 0.19 10*3/MM3 (ref 0–0.4)
EOSINOPHIL NFR BLD AUTO: 2.8 % (ref 0.3–6.2)
ERYTHROCYTE [DISTWIDTH] IN BLOOD BY AUTOMATED COUNT: 14.5 % (ref 12.3–15.4)
GLUCOSE SERPL-MCNC: 123 MG/DL (ref 65–99)
HCT VFR BLD AUTO: 38.2 % (ref 34–46.6)
HGB BLD-MCNC: 12.4 G/DL (ref 12–15.9)
IMM GRANULOCYTES # BLD AUTO: 0.01 10*3/MM3 (ref 0–0.05)
IMM GRANULOCYTES NFR BLD AUTO: 0.1 % (ref 0–0.5)
LYMPHOCYTES # BLD AUTO: 2.31 10*3/MM3 (ref 0.7–3.1)
LYMPHOCYTES NFR BLD AUTO: 34.6 % (ref 19.6–45.3)
MAGNESIUM SERPL-MCNC: 2.2 MG/DL (ref 1.6–2.6)
MCH RBC QN AUTO: 29.5 PG (ref 26.6–33)
MCHC RBC AUTO-ENTMCNC: 32.5 G/DL (ref 31.5–35.7)
MCV RBC AUTO: 91 FL (ref 79–97)
MONOCYTES # BLD AUTO: 0.58 10*3/MM3 (ref 0.1–0.9)
MONOCYTES NFR BLD AUTO: 8.7 % (ref 5–12)
NEUTROPHILS NFR BLD AUTO: 3.55 10*3/MM3 (ref 1.7–7)
NEUTROPHILS NFR BLD AUTO: 53.2 % (ref 42.7–76)
NRBC BLD AUTO-RTO: 0 /100 WBC (ref 0–0.2)
PHOSPHATE SERPL-MCNC: 3.6 MG/DL (ref 2.5–4.5)
PLATELET # BLD AUTO: 230 10*3/MM3 (ref 140–450)
PMV BLD AUTO: 10.5 FL (ref 6–12)
POTASSIUM SERPL-SCNC: 3.6 MMOL/L (ref 3.5–5.2)
RBC # BLD AUTO: 4.2 10*6/MM3 (ref 3.77–5.28)
SODIUM SERPL-SCNC: 136 MMOL/L (ref 136–145)
WBC NRBC COR # BLD AUTO: 6.68 10*3/MM3 (ref 3.4–10.8)

## 2025-05-12 PROCEDURE — 80069 RENAL FUNCTION PANEL: CPT | Performed by: PHYSICIAN ASSISTANT

## 2025-05-12 PROCEDURE — 83735 ASSAY OF MAGNESIUM: CPT | Performed by: PHYSICIAN ASSISTANT

## 2025-05-12 PROCEDURE — 85025 COMPLETE CBC W/AUTO DIFF WBC: CPT | Performed by: PHYSICIAN ASSISTANT

## 2025-05-12 RX ADMIN — LEVOTHYROXINE SODIUM 25 MCG: 0.03 TABLET ORAL at 06:04

## 2025-05-12 RX ADMIN — OXYCODONE 10 MG: 5 TABLET ORAL at 01:25

## 2025-05-12 RX ADMIN — HYDROCORTISONE ACETATE 1 APPLICATION: 1 CREAM TOPICAL at 08:10

## 2025-05-12 RX ADMIN — OXYCODONE 10 MG: 5 TABLET ORAL at 06:04

## 2025-05-12 RX ADMIN — HYDRALAZINE HYDROCHLORIDE 25 MG: 25 TABLET ORAL at 08:09

## 2025-05-12 RX ADMIN — AMLODIPINE BESYLATE 10 MG: 10 TABLET ORAL at 08:09

## 2025-05-12 RX ADMIN — Medication 10 ML: at 08:10

## 2025-05-12 NOTE — PLAN OF CARE
Goal Outcome Evaluation:  Plan of Care Reviewed With: patient        Progress: improving  Outcome Evaluation: Patient compliant with care. Patient's VSS. Patient's pain treated per MAR. Patient reported some tingling in her toes of her RLE, physician aware and decided to keep patient overnight to [facility]. Patient RLE had +2 dorsalis pedis pulse, normal color, and patient able to move her toes. Patient's rash with ithing trated with PRN benadryl. Patient alert and able to make needs known. Call light in reach and bed in lowest locked position. Patient has had no additional complaints so far.

## 2025-05-12 NOTE — PLAN OF CARE
Goal Outcome Evaluation:              Outcome Evaluation: VSS. UOP. Up standby assist. Pain controlled per patient; see MAR. Dc home today self care.

## 2025-05-12 NOTE — PLAN OF CARE
Goal Outcome Evaluation:   Progress: improving  Outcome evaluation: Patient improving this shift. Patient is able to use bedside commode with a standby assist. Able to independently shift positions while in bed. Pain medication request, see MAR and effective. Rash on back and left forearm no longer itch. Hydrocortisone cream was applied to rash with relief. A/0 x4. VSS.

## 2025-05-12 NOTE — PROGRESS NOTES
[facility]   Hospitalist Progress Note  Date: 2025  Patient Name: Davidson Lyle  : 1970  MRN: [id number]  Date of admission: 2025      Subjective   Subjective     Chief Complaint: Fall    Summary: Davidson Lyle is a 54 y.o. female with past medical history of hypothyroidism and hypertension presents to the ED due to fall.  Patient states she woke up in the [place] to go to the restroom and fell forward.  Patient denies hitting her head.  When EMS arrived patient was found somnolent.  Patient takes many meds at night that can make her drowsy.  Denies chest pain, shortness of breath, abdominal pain, nausea, vomit, diarrhea or fever.  In the ED, patient's vitals show heart rate 58, temperature 97.6, blood pressure 79/43 and satting 100% room air.  Labs show troponin 10, sodium 139, creatinine 1.9, bicarb 18, TSH 26.8, white blood cell 8.6 and hemoglobin 14.5.  Chest x-ray unremarkable.  Ankle x-ray shows acute trimalleolar fracture.  Orthopedic consulted and will evaluate in the morning.  Patient admitted to floors for further management.     Interval Followup: Patient seen and examined resting comfortably discharge held yesterday secondary to some tingling that the patient noted in her right lower extremity.  Examined today foot is warm with good color patient's pain is well-controlled no tingling numbness or discoloration hemodynamics are stable will discharge today with orders to follow-up with her PCP and orthopedic surgeon    Objective   Objective     Vitals:   Temp:  [97.5 °F (36.4 °C)-98.2 °F (36.8 °C)] 97.5 °F (36.4 °C)  Heart Rate:  [87-95] 87  Resp:  [17-18] 18  BP: (129-154)/(61-79) 129/75    Physical Exam   Constitutional: A wake alert no distress  Respiratory: Clear  Cardiovascular RRR  GI: Abdomen soft nontender      Result Review    Result Review:  I have reviewed the following  [x]  Laboratory  CBC          2025    04:27 2025    03:20 2025    04:58    CBC   WBC 13.61  8.54  6.68    RBC 4.50  4.04  4.20    Hemoglobin 13.4  12.0  12.4    Hematocrit 40.2  36.1  38.2    MCV 89.3  89.4  91.0    MCH 29.8  29.7  29.5    MCHC 33.3  33.2  32.5    RDW 14.2  14.4  14.5    Platelets 208  218  230      CMP          5/9/2025    04:27 5/11/2025    03:20 5/12/2025    04:58   CMP   Glucose 135  118  123    BUN 15  18  17    Creatinine 1.21  1.08  1.02    EGFR 52.7  60.4  64.7    Sodium 139  136  136    Potassium 4.0  3.5  3.6    Chloride 103  100  100    Calcium 10.2  9.7  9.6    Total Protein 8.3      Albumin 4.2  4.0  3.9    Total Bilirubin 0.3      Alkaline Phosphatase 106      AST (SGOT) 19      ALT (SGPT) 14      BUN/Creatinine Ratio 12.4  16.7  16.7    Anion Gap 13.3  13.1  13.2        []  Microbiology  []  Radiology  []  EKG/Telemetry   []  Cardiology/Vascular   []  Pathology  []  Old records  []  Other:    Assessment & Plan   Assessment / Plan   Assessment:    Hypotension resolved  UTI  Mechanical fall  Right ankle fracture secondary to the above  Bradycardia resolved  CKD  Hypothyroidism  CVA  CAD  GERD  Von Willebrand disease    Plan:    Okay to discharge today  Continue toe-touch weightbearing  Continue Norvasc 10 mg daily  Follow-up with PCP for blood pressure check  Follow-up with orthopedic surgery for postoperative follow-up  Return to ED for worsening symptoms  Discussed plan with RN.    VTE Prophylaxis:  Mechanical VTE prophylaxis orders are present.        CODE STATUS:   Code Status (Patient has no pulse and is not breathing): CPR (Attempt to Resuscitate)  Medical Interventions (Patient has pulse or is breathing): Full Support  Electronically signed by RAFFI Avila, 05/12/25, 10:58 AM EDT.        Attending Documentation:  Patient independently seen and evaluated, above documentation reflects plan put forth during bedside rounds.  More than 51% of the time of this patient encounter was performed by me. I discussed the care plan with TAMIKO Atkins PA-C, I  agree with his findings and plan as documented, what I have added to the care plan and modified is as follows in my documentation and my medical decision making; 54-year-old female came in with hypotension, fall, broke her ankle, got that fixed.  Doing better today, resumed blood pressure medication with Norvasc.  Would benefit from rehab but she is adamantly against going, likely can discharge home tomorrow.  Electronically signed by Ron Hartley MD, 05/10/25, 6:09 PM EDT.

## 2025-05-12 NOTE — SIGNIFICANT NOTE
05/12/25 0909   OTHER   Discipline occupational therapist   Rehab Time/Intention   Session Not Performed other (see comments)  (pending d/c)

## 2025-05-12 NOTE — SIGNIFICANT NOTE
05/12/25 0959   Physical Therapy Time and Intention   Session Not Performed   (Pt has orders for discharge shortly.)

## 2025-05-13 ENCOUNTER — READMISSION MANAGEMENT (OUTPATIENT)
Dept: CALL CENTER | Facility: HOSPITAL | Age: 55
End: 2025-05-13
Payer: MEDICARE

## 2025-05-13 NOTE — OUTREACH NOTE
Prep Survey      Flowsheet Row Responses   [facility] facility patient discharged from? [facility]   Is LACE score < 7 ? No   Eligibility Readm Mgmt   Discharge diagnosis Hypotension   Does the patient have one of the following disease processes/diagnoses(primary or secondary)? Other   Does the patient have Home health ordered? No   Is there a DME ordered? Yes   What DME was ordered? [employer]--walker   Medication alerts for this patient see avs   Prep survey completed? Yes            Aura SANDY - Registered Nurse

## 2025-05-21 ENCOUNTER — OFFICE VISIT (OUTPATIENT)
Dept: ORTHOPEDIC SURGERY | Facility: CLINIC | Age: 55
End: 2025-05-21
Payer: MEDICARE

## 2025-05-21 ENCOUNTER — TELEPHONE (OUTPATIENT)
Dept: ORTHOPEDIC SURGERY | Facility: CLINIC | Age: 55
End: 2025-05-21

## 2025-05-21 VITALS — WEIGHT: 193 LBS | BODY MASS INDEX: 31.02 KG/M2 | HEIGHT: 66 IN

## 2025-05-21 DIAGNOSIS — Z98.890 S/P ORIF (OPEN REDUCTION INTERNAL FIXATION) FRACTURE: Primary | ICD-10-CM

## 2025-05-21 DIAGNOSIS — Z87.81 S/P ORIF (OPEN REDUCTION INTERNAL FIXATION) FRACTURE: Primary | ICD-10-CM

## 2025-05-21 NOTE — TELEPHONE ENCOUNTER
RECEIVED BELOW MESSAGE FROM YEE MCKEE REGARDING PATIENT PAIN MEDICATION. SPOKE WITH DR. RICHMOND'S OFFICE LET HER KNOW THAT DR. SIMMONS WOULD NOT PRESCRIBE ANYTHING HIGHER THAN PERCOCET 7.5/325MG. SHE STATES SHE WILL GET MESSAGE TO DR. RICHMOND.       Dominga Palma, Herlinda Harris; Charlotte Rodriges; Rut Valadez, RN  Would someone please contact this patient's primary care office for me? Dr Madi Richmond    This patient is normally prescribed oxycodone IR 5 mg Q 4-6 for her chronic back pain by her PCP.  Patient has had recent ankle surgery and per the patient her PCP agrees that she should need 10 mg Oxy IR every 4-6 hours to help her through the breakthrough pain from the ankle.  After discussion with Dr. Simmons, the highest pain medication that we prescribe would be Percocet 7.5/325.  Obviously this is not equal to her base dose of pain medication so at this time it would be best if the PCP would continue the pain medication, at the dose she feels is appropriate.    Dr. Simmons does not prescribe 10 mg oxy IR for any of his patients, and although I understand that the patient is on this for chronic pain management any increased pain medication from there we will need to be done from the person prescribing her pain medication normally.    Thank you!  -Dominga

## 2025-05-21 NOTE — PROGRESS NOTES
Chief Complaint  Follow-up and Pain of the Right Ankle    Subjective      Davidson Lyle presents to [facility]     History of Present Illness  The patient is here today following up on her right ankle.  She is 2 weeks status post right ankle open reduction internal fixation performed by Dr. Simmons On 5/8/2025.  Patient had open reduction internal fixation of the right distal fibula, medial malleolus and syndesmotic screw screw placement.     She reports persistent pain since her surgery, with the exception of a few hours of relief during her hospital stay. Pain management has been achieved with oxycodone 5 mg, taken four times daily, which was increased post-surgery to 10 mg every 4 to 6 hours. She has been adhering to the recommended practice of elevating her foot. There is no numbness or tingling in her toes, but she notes slight numbness in her heel, the severity of which she cannot assess due to the presence of a cast. Additionally, she reports the development of blisters on the back of her foot, one of which is currently oozing.    A visit with Dr. Monson occurred yesterday, during which she received a 3-day supply of medication. Dr. Monson expressed a desire to transfer the management of her pain medication to our care. Her last refill of pain medication from Dr. Monson was on Monday, which was a 3-day supply, and she has been taking approximately 10 pills daily.  For mobility, she has been using a walker and has not been bearing weight on her foot.  She has been on blood thinners following a stroke a few years ago.       Allergies   Allergen Reactions    Duloxetine Hallucinations     Other reaction(s): hallucinations    Duloxetine Hcl Hallucinations    Baclofen Unknown - Low Severity    Metaxalone Unknown - Low Severity    Sulfa Antibiotics Unknown - Low Severity    Latex Rash       Objective     Vital Signs:   Vitals:    05/21/25 0753   Weight: 87.5 kg (193 lb)   Height:  "167.6 cm (66\")     Body mass index is 31.15 kg/m².    I reviewed the patient's chief complaint, history of present illness, review of systems, past medical history, surgical history, family history, social history, medications, and allergy list.     Ortho Exam    Right lower extremity: Splint removed today.  Staples removed today without complications.  Incision is well-healing, no swelling, erythema, drainage, or signs of infection. Mild ankle swelling.  Tenderness with palpation over the midfoot, hindfoot and forefoot. Tenderness with palpation over the medial and lateral malleolus. Calf soft, nontender.  Demonstrates intact active dorsiflexion and plantarflexion of the ankle with associated stiffness stiffness. Demonstrates intact active toe flexion and extension with no pain. Sensation intact. Several large fracture blood filled fracture blisters on the posterior ankle. Palpable pedal pulse. Less than 2-second capillary refill.          Orthopedic Injury Treatment    Date/Time: 5/21/2025 9:36 AM    Performed by: Violeta Banegas MA  Authorized by: Dominga Palma APRN  Injury location: ankle  Location details: right ankle  Pre-procedure neurovascular assessment: neurovascularly intact    Anesthesia:  Local anesthesia used: no    Sedation:  Patient sedated: no    Immobilization: cast  Splint type: short leg  Supplies used: cotton padding (fiberglass)  Post-procedure neurovascular assessment: post-procedure neurovascularly intact  Patient tolerance: patient tolerated the procedure well with no immediate complications  Comments: Closed treatment was obtained and fiberglass cast was applied.  The patient tolerated the procedure without any complications.            Imaging Results (Most Recent)       Procedure Component Value Units Date/Time    XR Ankle 3+ View Right [[id number]] Resulted: 05/21/25 0934     Updated: 05/21/25 0934    Narrative:      X-Ray Report:  Study: X-rays ordered, taken in the office, and " reviewed today  Site: Right ankle xray  Indication: Right ankle ORIF follow-up  View: AP/Lateral and oblique right ankle view(s)  Findings: Stable and aligned right medial malleolus fracture.  Screws are   intact without evidence of loosening or displacement when compared to   intraoperative films.  Right distal fibula fracture is stable with   hardware intact.  Syndesmotic screws intact without any complications or   evidence of loosening.  Prior studies available for comparison: yes              Results           Assessment and Plan   Diagnoses and all orders for this visit:    1. S/P ORIF (open reduction internal fixation) right ankle medial malleolus and distal fibula with syndesmotic screw placement fracture (Primary)  -     XR Ankle 3+ View Right  -     Miscellaneous DME  -     Orthopedic Injury Treatment         Davidson Galvezrakel presents today 2 weeks postop right ankle open reduction internal fixation of the distal fibula, medial malleolus and syndesmotic screw placement performed by Dr. Simmons on 5/8/2025.  Sutures removed today without complications.  Incision is well-healing.  No active drainage or redness noted.  No concerning signs for infection.  Patient denies fever or chills.  Patient was placed in a well-padded short leg cast today without complications. Cast care education provided.  Patient is instructed to remain nonweightbearing.  Patient will remain in cast for 4 weeks before transitioning to a fracture boot.  We discussed using ice and elevation to help with inflammation.  Patient will perform toe and knee range of motion exercises.   Patient's PCP office was contacted and discussed pain medication prescriptions.  Dr. Simmons stated that the highest pain medication that he will prescribe is going to be 7.5/325 Percocet.  Patient takes a standard dose of medication higher than that currently.  It was recommended for the patient's PCP to manage her pain medication going forward.    Patient will  follow up in 4 weeks for reevaluation.      We will obtain new x-rays of the right ankle without the cast at next visit.    Call or return if symptoms worsen or patient has any concerns.          Follow Up   Return in about 1 month (around 6/21/2025).  There are no Patient Instructions on file for this visit.    Patient was given instructions and counseling regarding her condition or for health maintenance advice. Please see specific information pulled into the AVS if appropriate.     Patient or patient representative verbalized consent for the use of Ambient Listening during the visit with  YEE Moya for chart documentation. 5/21/2025  13:49 EDT    Dictated Utilizing Dragon Dictation. Please note that portions of this note were completed with a voice recognition program. Part of this note may be an electronic transcription/translation of spoken language to printed text using the Dragon Dictation System.

## 2025-05-22 ENCOUNTER — READMISSION MANAGEMENT (OUTPATIENT)
Dept: CALL CENTER | Facility: HOSPITAL | Age: 55
End: 2025-05-22
Payer: MEDICARE

## 2025-05-22 NOTE — OUTREACH NOTE
Medical Week 2 Survey      Flowsheet Row Responses   [facility] [place] patient discharged from? [facility]   Does the patient have one of the following disease processes/diagnoses(primary or secondary)? Other   Week 2 attempt successful? Yes   Call start time 1452   Discharge diagnosis Hypotension   Call end time 1456   Person spoke with today (if not patient) and relationship patient   Medication alerts for this patient Patient has seen orthopedics.   Meds reviewed with patient/caregiver? Yes   Is the patient having any side effects they believe may be caused by any medication additions or changes? No   Does the patient have all medications ordered at discharge? Yes   Is the patient taking all medications as directed (includes completed medication regime)? Yes   Does the patient have a primary care provider?  Yes   Does the patient have an appointment with their PCP within 7 days of discharge? Yes   Comments regarding PCP Patient has f/u with PCP   Has the patient kept scheduled appointments due by today? Yes   What DME was ordered? [employer]walker   Psychosocial issues? No   Did the patient receive a copy of their discharge instructions? Yes   Nursing interventions Reviewed instructions with patient   What is the patient's perception of their health status since discharge? Improving   Is the patient/caregiver able to teach back signs and symptoms related to disease process for when to call PCP? Yes   Additional teach back comments patient continues to be non weight bearing, she is having some pain.   Week 2 Call Completed? Yes   Graduated Yes   Did the patient feel the follow up calls were helpful during their recovery period? Yes   Is the patient interested in additional calls from an ambulatory ? No   Would this patient benefit from a Referral to Amb Social Work? No   Wrap up additional comments Pt doing well at this time.  Denies needs.   Call end time 1456            BRIAN GREEN - Registered Nurse

## 2025-05-22 NOTE — TELEPHONE ENCOUNTER
PATIENT CALLED TO CHECK STATUS OF PAIN MEDICATION; HUB UNABLE TO RELAY  PLEASE CALL PATIENT ASAP    [phone]    UNABLE TO WARM TRANSFER

## 2025-05-22 NOTE — TELEPHONE ENCOUNTER
SPOKE WITH PATIENT REGARDING PAIN MEDICATION. PATIENT ADVISED WE SPOKE WITH DR. RICHMOND'S OFFICE YESTERDAY AND THEY WERE SUPPOSE TO RELAY MESSAGE TO DR. RICHMOND. ADVISED PATIENT TO CONTACT DR. CASTREJON OFFICE TO DISCUSS IF DR RICHMOND WILL CONTINUE TO PRESCRIBE THE OXYCODONE IR FOR HER. PATIENT VERBALIZED UNDERSTANDING.

## 2025-06-25 ENCOUNTER — OFFICE VISIT (OUTPATIENT)
Dept: ORTHOPEDIC SURGERY | Facility: CLINIC | Age: 55
End: 2025-06-25
Payer: MEDICARE

## 2025-06-25 VITALS
HEART RATE: 128 BPM | OXYGEN SATURATION: 97 % | WEIGHT: 193 LBS | SYSTOLIC BLOOD PRESSURE: 115 MMHG | BODY MASS INDEX: 31.02 KG/M2 | HEIGHT: 66 IN | DIASTOLIC BLOOD PRESSURE: 78 MMHG

## 2025-06-25 DIAGNOSIS — Z87.81 S/P ORIF (OPEN REDUCTION INTERNAL FIXATION) FRACTURE: Primary | ICD-10-CM

## 2025-06-25 DIAGNOSIS — Z98.890 S/P ORIF (OPEN REDUCTION INTERNAL FIXATION) FRACTURE: Primary | ICD-10-CM

## 2025-06-25 NOTE — PROGRESS NOTES
"Chief Complaint  Follow-up and Pain of the Right Ankle    Subjective      Davidson Lyle presents to [facility]     History of Present Illness  Patient is here today following up on her right ankle.  She is 6 weeks and 6 days status post right ankle ORIF performed by Dr. Peñaloza on on 5/8/2025.  She had open reduction internal fixation of the right distal fibula, medial malleolus and syndesmotic screw placement.  Patient is here today with her cast intact.    She reports experiencing an unusual sensation in her ankle, which she describes as a combination of discomfort and vibration. She has been managing the pain with medication, having taken her prescribed analgesic this morning that is prescribed by her PCP. Additionally, she notes that her ankles have been particularly sore. To alleviate swelling, she has been elevating her leg during sleep, as failure to do so results in increased swelling and a subsequent tightening of the cast.  Patient denies any numbness or tingling in her foot or toes.  She has movement of her toes without difficulty or pain      Allergies   Allergen Reactions    Duloxetine Hallucinations     Other reaction(s): hallucinations    Duloxetine Hcl Hallucinations    Baclofen Unknown - Low Severity    Metaxalone Unknown - Low Severity    Sulfa Antibiotics Unknown - Low Severity    Latex Rash       Objective     Vital Signs:   Vitals:    06/25/25 0729   BP: 115/78   Pulse: (!) 128   SpO2: 97%   Weight: 87.5 kg (193 lb)   Height: 167.6 cm (66\")     Body mass index is 31.15 kg/m².    I reviewed the patient's chief complaint, history of present illness, review of systems, past medical history, surgical history, family history, social history, medications, and allergy list.     Ortho Exam    General: Alert. No acute distress.   Right lower extremity: Cast removed today. Incision are well-healing, no swelling, erythema, drainage, or signs of infection.  No ankle " swelling.  Mild tenderness with palpation over the midfoot, hindfoot and forefoot.  Mild tenderness with palpation over the medial and lateral malleolus. Calf soft, nontender.  Demonstrates intact active dorsiflexion and plantarflexion of the ankle with mild associated stiffness. Demonstrates intact active toe flexion and extension with mild pain. Sensation intact. Palpable pedal pulse. Less than 2-second capillary refill.               Imaging Results (Most Recent)       Procedure Component Value Units Date/Time    XR Ankle 3+ View Right - Preliminary [[id number]] Resulted: 06/25/25 0750     Updated: 06/25/25 0750    This result has not been signed. Information might be incomplete.      Narrative:      X-Ray Report:  Study: X-rays ordered, taken in the office, and reviewed today  Site: Right ankle xray  Indication: Right ankle ORIF with syndesmosis  View: Oblique and AP/Lateral view(s)  Findings: Stable and intact right distal fibula ORIF.  Hardware is intact   without evidence of loosening or complications.  Fracture is well aligned   and interval healing is seen.  Medial malleolus screws are intact with   interval healing seen of the previous fracture line.  Prior studies available for comparison: yes              Results  Imaging   - X-ray of the ankle: Good healing progress in the ankle.         Assessment and Plan   Diagnoses and all orders for this visit:    1. S/P ORIF (open reduction internal fixation) right ankle medial malleolus and distal fibula with syndesmotic screw placement fracture (Primary)  -     XR Ankle 3+ View Right        Assessment & Plan  1.  S/p ORIF right ankle medial malleolus and distal fibula with symptomatic screw placement:  X-ray results are satisfactory, indicating appropriate healing progression. The presence of two screws is currently inhibiting the ability to rotate the ankle, although up and down movement is possible. The incisions are well healed, and fracture blisters have  resolved with dry skin. Surrounding areas show no active redness, drainage, or concerns for infection. Demonstrates active ankle dorsiflexion and flexion, plantar flexion with associated stiffness secondary to pain.    A boot will be provided for support, which can be removed as needed. Avoid soaking the ankle in water and refrain from applying lotion over the incisions. Compression socks are recommended to manage swelling, along with elevation and icing of the ankle.  Toe-touch weightbearing is allowed, in the boot at all times.  A follow-up appointment will be scheduled in a month for another set of x-rays and to plan for the removal of the screws. Therapy will be initiated post-screw removal to facilitate quicker recovery. Contact the office if any questions or concerns arise.    Follow-up: The patient will follow up in 1 month.  Obtain three-view x-rays of the right ankle that time.              Tobacco Use: Medium Risk (6/25/2025)    Patient History     Smoking Tobacco Use: Former     Smokeless Tobacco Use: Never     Passive Exposure: Past     Patient reports they have a history of tobacco use; encouraged continued tobacco cessation for further health benefits.     BMI is >= 30 and <35. (Class 1 Obesity). The following options were offered after discussion;: Follow up with PCP      Follow Up   Return in about 1 month (around 7/25/2025).  There are no Patient Instructions on file for this visit.    Patient was given instructions and counseling regarding her condition or for health maintenance advice. Please see specific information pulled into the AVS if appropriate.     Patient or patient representative verbalized consent for the use of Ambient Listening during the visit with  YEE Moya for chart documentation. 6/25/2025  07:44 EDT    Dictated Utilizing Dragon Dictation. Please note that portions of this note were completed with a voice recognition program. Part of this note may be an electronic  transcription/translation of spoken language to printed text using the Dragon Dictation System.

## 2025-07-09 ENCOUNTER — TRANSCRIBE ORDERS (OUTPATIENT)
Dept: ADMINISTRATIVE | Facility: HOSPITAL | Age: 55
End: 2025-07-09
Payer: MEDICARE

## 2025-07-09 DIAGNOSIS — E55.9 VITAMIN D DEFICIENCY: ICD-10-CM

## 2025-07-09 DIAGNOSIS — E53.8 COBALAMIN DEFICIENCY: ICD-10-CM

## 2025-07-09 DIAGNOSIS — E03.9 HYPOTHYROIDISM, UNSPECIFIED TYPE: Primary | ICD-10-CM

## 2025-07-23 ENCOUNTER — TELEPHONE (OUTPATIENT)
Dept: ORTHOPEDIC SURGERY | Facility: CLINIC | Age: 55
End: 2025-07-23

## 2025-07-23 NOTE — TELEPHONE ENCOUNTER
JUST JUANCARLOS:     Caller: Davidson Lyle / PATIENT     Best call back number: [phone]    PATIENT WAS RESCHEDULED TO 7/30 @ 0745 w/JOSE ABRAHAM FOR RIGHT ANKLE S/P ORIF SURGERY 5/8/2025 DR MARTINES / ROXANAP 1M / NEED NEW XR / RESCHEDULED 7/23 HUB     PATIENT OVERSLEPT & NO-SHOWED HER APPT TODAY 7/23 @ 3507     THANKS

## 2025-07-30 ENCOUNTER — OFFICE VISIT (OUTPATIENT)
Dept: ORTHOPEDIC SURGERY | Facility: CLINIC | Age: 55
End: 2025-07-30
Payer: MEDICARE

## 2025-07-30 ENCOUNTER — PREP FOR SURGERY (OUTPATIENT)
Dept: OTHER | Facility: HOSPITAL | Age: 55
End: 2025-07-30
Payer: MEDICARE

## 2025-07-30 VITALS
DIASTOLIC BLOOD PRESSURE: 83 MMHG | WEIGHT: 193 LBS | SYSTOLIC BLOOD PRESSURE: 142 MMHG | BODY MASS INDEX: 31.02 KG/M2 | HEART RATE: 129 BPM | OXYGEN SATURATION: 97 % | HEIGHT: 66 IN

## 2025-07-30 DIAGNOSIS — Z87.81 S/P ORIF (OPEN REDUCTION INTERNAL FIXATION) FRACTURE: ICD-10-CM

## 2025-07-30 DIAGNOSIS — M25.571 RIGHT ANKLE PAIN, UNSPECIFIED CHRONICITY: Primary | ICD-10-CM

## 2025-07-30 DIAGNOSIS — Z87.81 S/P ORIF (OPEN REDUCTION INTERNAL FIXATION) FRACTURE: Primary | ICD-10-CM

## 2025-07-30 DIAGNOSIS — Z98.890 S/P ORIF (OPEN REDUCTION INTERNAL FIXATION) FRACTURE: Primary | ICD-10-CM

## 2025-07-30 DIAGNOSIS — S82.851A CLOSED TRIMALLEOLAR FRACTURE OF RIGHT ANKLE, INITIAL ENCOUNTER: ICD-10-CM

## 2025-07-30 DIAGNOSIS — Z98.890 S/P ORIF (OPEN REDUCTION INTERNAL FIXATION) FRACTURE: ICD-10-CM

## 2025-07-30 NOTE — PROGRESS NOTES
"Chief Complaint  Follow-up and Pain of the Right Ankle    Subjective      Davidson Lyle presents to [facility]     History of Present Illness  Patient is here today following up on her right ankle. Patient is 10 weeks status post open reduction internal fixation of the right distal fibula, medial malleolus and syndesmotic screw placement performed Dr. Simmons on 5/8/2025.     She reports a persistent tingling sensation in her foot, which she has been managing by keeping it elevated. However, she has observed that the foot becomes significantly red when she stands up to use the restroom. This redness was not present immediately after the cast removal but has developed over the past few weeks. She also notes soreness around the pin sites and on the side of her foot. Her toes have turned red, and she experiences numbness and tingling over the incision site. Despite these symptoms, she believes that her condition will improve over time. She is currently taking pain medication, which she finds helpful.      Allergies   Allergen Reactions    Duloxetine Hallucinations     Other reaction(s): hallucinations    Duloxetine Hcl Hallucinations    Baclofen Unknown - Low Severity    Metaxalone Unknown - Low Severity    Sulfa Antibiotics Unknown - Low Severity    Latex Rash       Objective     Vital Signs:   Vitals:    07/30/25 0749   BP: 142/83   Pulse: (!) 129   SpO2: 97%   Weight: 87.5 kg (193 lb)   Height: 167.6 cm (66\")     Body mass index is 31.15 kg/m².    I reviewed the patient's chief complaint, history of present illness, review of systems, past medical history, surgical history, family history, social history, medications, and allergy list.     Ortho Exam  Ankle/Foot   General: Alert. No acute distress.   Right lower extremity: Incision is well-healing, no swelling, erythema, drainage, or signs of infection.  Scarring from previous blisters with no signs of cellulitis.  Minimal ankle " swelling.  Mild tenderness with palpation over the midfoot, hindfoot and forefoot.  Mild tenderness with palpation over the medial and lateral malleolus. Calf soft, nontender.  Demonstrates intact active dorsiflexion and plantarflexion of the ankle with mild tenderness stiffness. Demonstrates intact active toe flexion and extension with no pain. Sensation intact. Palpable pedal pulse. Less than 2-second capillary refill.               Imaging Results (Most Recent)       Procedure Component Value Units Date/Time    XR Ankle 3+ View Right - Preliminary [[id number]] Resulted: 07/30/25 0825     Updated: 07/30/25 0825    This result has not been signed. Information might be incomplete.      Narrative:      X-Ray Report:  Study: X-rays ordered, taken in the office, and reviewed today  Site: Right ankle xray  Indication: Right distal fibula fracture with syndesmotic screw placement  View: Oblique and AP/Lateral view(s)  Findings: Stable right distal fibula ORIF interval healing seen of the   previous distal fibula fracture.  Distal tibia hardware is intact with   evidence of healing of prior fracture.  Syndesmotic screws are intact   without evidence of complications or loosening  Prior studies available for comparison: yes              Results           Assessment and Plan   Diagnoses and all orders for this visit:    1. Right ankle pain, unspecified chronicity (Primary)  -     XR Ankle 3+ View Right    2. S/P ORIF (open reduction internal fixation) right ankle medial malleolus and distal fibula with syndesmotic screw placement fracture         Assessment & Plan  1.  S/p ORIF of the right ankle with syndesmotic screws placement:  The foot pain is likely due to irritation and inflammation from the healing process. Redness and tingling are noted, especially when the foot is not elevated. Pain medication is helping, but there is soreness around the pins and numbness over the incision. Swelling appears to be under  control.    Discussed right ankle hardware removal/removal of syndesmotic screws surgery with the patient. Risks/benefits discussed with patient including, but not limited to: infection, bleeding, neurovascular damage, malunion, nonunion, aesthetic deformity, need for further surgery, and death. Patient understands and desires to proceed. Surgery pamphlet provided to patient. Patient is to meet with our surgery scheduler at check out and proceed with scheduling of surgery.    A procedure to remove the screws will be scheduled. Some screws will remain in place as they are not intended for removal. The two main screws causing discomfort will be removed, allowing for full to modified weight-bearing immediately post-procedure. This may cause some discomfort and swelling due to the aggravation of the area. Physical therapy will be initiated to regain strength.    Follow-up: A follow-up appointment will be scheduled 2 weeks after the screw removal procedure.    All questions and concerns were addressed and answered. Patient left the office without any additional questions.   Tobacco Use: Medium Risk (7/30/2025)    Patient History     Smoking Tobacco Use: Former     Smokeless Tobacco Use: Never     Passive Exposure: Past     Patient reports they have a history of tobacco use; encouraged continued tobacco cessation for further health benefits.                 Follow Up   No follow-ups on file.  There are no Patient Instructions on file for this visit.    Patient was given instructions and counseling regarding her condition or for health maintenance advice. Please see specific information pulled into the AVS if appropriate.     Patient or patient representative verbalized consent for the use of Ambient Listening during the visit with  YEE Moya for chart documentation. 7/30/2025  08:31 EDT    Dictated Utilizing Dragon Dictation. Please note that portions of this note were completed with a voice recognition  program. Part of this note may be an electronic transcription/translation of spoken language to printed text using the Dragon Dictation System.

## 2025-08-01 ENCOUNTER — TELEPHONE (OUTPATIENT)
Dept: ORTHOPEDIC SURGERY | Facility: CLINIC | Age: 55
End: 2025-08-01
Payer: MEDICARE

## 2025-08-01 NOTE — TELEPHONE ENCOUNTER
PATIENT NOTIFIED PER MEDICAL CLEARANCE / MED DIRECTIVE RECEIVED FROM DR. RICHMOND SHE IS TO HOLD/STOP PLAVIX AND ASPIRIN FOR 7 DAYS PRIOR TO SURGERY. PATIENT VERBALIZED UNDERSTANDING.

## 2025-08-06 RX ORDER — AMLODIPINE BESYLATE 10 MG/1
10 TABLET ORAL DAILY
COMMUNITY

## 2025-08-06 RX ORDER — LOSARTAN POTASSIUM 50 MG/1
50 TABLET ORAL DAILY
COMMUNITY

## 2025-08-06 RX ORDER — NADOLOL 20 MG/1
10 TABLET ORAL DAILY
COMMUNITY

## 2025-08-07 ENCOUNTER — ANESTHESIA EVENT (OUTPATIENT)
Dept: PERIOP | Facility: HOSPITAL | Age: 55
End: 2025-08-07
Payer: MEDICARE

## 2025-08-07 ENCOUNTER — APPOINTMENT (OUTPATIENT)
Dept: GENERAL RADIOLOGY | Facility: HOSPITAL | Age: 55
End: 2025-08-07
Payer: MEDICARE

## 2025-08-07 ENCOUNTER — HOSPITAL ENCOUNTER (OUTPATIENT)
Facility: HOSPITAL | Age: 55
Setting detail: HOSPITAL OUTPATIENT SURGERY
Discharge: HOME | End: 2025-08-07
Attending: ORTHOPAEDIC SURGERY | Admitting: ORTHOPAEDIC SURGERY
Payer: MEDICARE

## 2025-08-07 ENCOUNTER — ANESTHESIA (OUTPATIENT)
Dept: PERIOP | Facility: HOSPITAL | Age: 55
End: 2025-08-07
Payer: MEDICARE

## 2025-08-07 VITALS
DIASTOLIC BLOOD PRESSURE: 81 MMHG | BODY MASS INDEX: 31 KG/M2 | RESPIRATION RATE: 14 BRPM | HEART RATE: 61 BPM | HEIGHT: 66 IN | TEMPERATURE: 97 F | SYSTOLIC BLOOD PRESSURE: 149 MMHG | OXYGEN SATURATION: 96 % | WEIGHT: 192.9 LBS

## 2025-08-07 DIAGNOSIS — S82.851A CLOSED TRIMALLEOLAR FRACTURE OF RIGHT ANKLE, INITIAL ENCOUNTER: ICD-10-CM

## 2025-08-07 DIAGNOSIS — S82.851D CLOSED TRIMALLEOLAR FRACTURE OF RIGHT ANKLE WITH ROUTINE HEALING, SUBSEQUENT ENCOUNTER: Primary | ICD-10-CM

## 2025-08-07 DIAGNOSIS — Z98.890 S/P ORIF (OPEN REDUCTION INTERNAL FIXATION) FRACTURE: ICD-10-CM

## 2025-08-07 DIAGNOSIS — Z87.81 S/P ORIF (OPEN REDUCTION INTERNAL FIXATION) FRACTURE: ICD-10-CM

## 2025-08-07 PROCEDURE — 20680 REMOVAL OF IMPLANT DEEP: CPT | Performed by: ORTHOPAEDIC SURGERY

## 2025-08-07 PROCEDURE — 25010000002 CEFAZOLIN PER 500 MG

## 2025-08-07 PROCEDURE — 25010000002 ONDANSETRON PER 1 MG: Performed by: NURSE ANESTHETIST, CERTIFIED REGISTERED

## 2025-08-07 PROCEDURE — 25010000002 MIDAZOLAM PER 1MG: Performed by: ANESTHESIOLOGY

## 2025-08-07 PROCEDURE — 25010000002 HYDROMORPHONE 1 MG/ML SOLUTION: Performed by: NURSE ANESTHETIST, CERTIFIED REGISTERED

## 2025-08-07 PROCEDURE — 76000 FLUOROSCOPY <1 HR PHYS/QHP: CPT

## 2025-08-07 PROCEDURE — 25810000003 LACTATED RINGERS PER 1000 ML: Performed by: ANESTHESIOLOGY

## 2025-08-07 PROCEDURE — 25010000002 PROPOFOL 10 MG/ML EMULSION: Performed by: NURSE ANESTHETIST, CERTIFIED REGISTERED

## 2025-08-07 PROCEDURE — 25010000002 FENTANYL CITRATE (PF) 50 MCG/ML SOLUTION: Performed by: NURSE ANESTHETIST, CERTIFIED REGISTERED

## 2025-08-07 PROCEDURE — 25010000002 LIDOCAINE PF 2% 2 % SOLUTION: Performed by: NURSE ANESTHETIST, CERTIFIED REGISTERED

## 2025-08-07 PROCEDURE — S0260 H&P FOR SURGERY: HCPCS | Performed by: ORTHOPAEDIC SURGERY

## 2025-08-07 PROCEDURE — 25010000002 DEXAMETHASONE PER 1 MG: Performed by: NURSE ANESTHETIST, CERTIFIED REGISTERED

## 2025-08-07 RX ORDER — ONDANSETRON 2 MG/ML
4 INJECTION INTRAMUSCULAR; INTRAVENOUS ONCE AS NEEDED
Status: DISCONTINUED | OUTPATIENT
Start: 2025-08-07 | End: 2025-08-07 | Stop reason: HOSPADM

## 2025-08-07 RX ORDER — FENTANYL CITRATE 50 UG/ML
INJECTION, SOLUTION INTRAMUSCULAR; INTRAVENOUS AS NEEDED
Status: DISCONTINUED | OUTPATIENT
Start: 2025-08-07 | End: 2025-08-07 | Stop reason: SURG

## 2025-08-07 RX ORDER — PROMETHAZINE HYDROCHLORIDE 12.5 MG/1
25 TABLET ORAL ONCE AS NEEDED
Status: DISCONTINUED | OUTPATIENT
Start: 2025-08-07 | End: 2025-08-07 | Stop reason: HOSPADM

## 2025-08-07 RX ORDER — SCOPOLAMINE 1 MG/3D
1 PATCH, EXTENDED RELEASE TRANSDERMAL ONCE
Status: DISCONTINUED | OUTPATIENT
Start: 2025-08-07 | End: 2025-08-07 | Stop reason: HOSPADM

## 2025-08-07 RX ORDER — LIDOCAINE HYDROCHLORIDE 20 MG/ML
INJECTION, SOLUTION EPIDURAL; INFILTRATION; INTRACAUDAL; PERINEURAL AS NEEDED
Status: DISCONTINUED | OUTPATIENT
Start: 2025-08-07 | End: 2025-08-07 | Stop reason: SURG

## 2025-08-07 RX ORDER — ACETAMINOPHEN 500 MG
1000 TABLET ORAL ONCE
Status: DISCONTINUED | OUTPATIENT
Start: 2025-08-07 | End: 2025-08-07 | Stop reason: HOSPADM

## 2025-08-07 RX ORDER — PROPOFOL 10 MG/ML
VIAL (ML) INTRAVENOUS AS NEEDED
Status: DISCONTINUED | OUTPATIENT
Start: 2025-08-07 | End: 2025-08-07 | Stop reason: SURG

## 2025-08-07 RX ORDER — SODIUM CHLORIDE, SODIUM LACTATE, POTASSIUM CHLORIDE, CALCIUM CHLORIDE 600; 310; 30; 20 MG/100ML; MG/100ML; MG/100ML; MG/100ML
9 INJECTION, SOLUTION INTRAVENOUS CONTINUOUS PRN
Status: DISCONTINUED | OUTPATIENT
Start: 2025-08-07 | End: 2025-08-07 | Stop reason: HOSPADM

## 2025-08-07 RX ORDER — ONDANSETRON 2 MG/ML
INJECTION INTRAMUSCULAR; INTRAVENOUS AS NEEDED
Status: DISCONTINUED | OUTPATIENT
Start: 2025-08-07 | End: 2025-08-07 | Stop reason: SURG

## 2025-08-07 RX ORDER — PROMETHAZINE HYDROCHLORIDE 25 MG/1
25 SUPPOSITORY RECTAL ONCE AS NEEDED
Status: DISCONTINUED | OUTPATIENT
Start: 2025-08-07 | End: 2025-08-07 | Stop reason: HOSPADM

## 2025-08-07 RX ORDER — MIDAZOLAM HYDROCHLORIDE 2 MG/2ML
2 INJECTION, SOLUTION INTRAMUSCULAR; INTRAVENOUS ONCE
Status: COMPLETED | OUTPATIENT
Start: 2025-08-07 | End: 2025-08-07

## 2025-08-07 RX ORDER — HYDROCODONE BITARTRATE AND ACETAMINOPHEN 7.5; 325 MG/1; MG/1
1 TABLET ORAL EVERY 6 HOURS PRN
Qty: 12 TABLET | Refills: 0 | Status: SHIPPED | OUTPATIENT
Start: 2025-08-07

## 2025-08-07 RX ORDER — DEXAMETHASONE SODIUM PHOSPHATE 4 MG/ML
INJECTION, SOLUTION INTRA-ARTICULAR; INTRALESIONAL; INTRAMUSCULAR; INTRAVENOUS; SOFT TISSUE AS NEEDED
Status: DISCONTINUED | OUTPATIENT
Start: 2025-08-07 | End: 2025-08-07 | Stop reason: SURG

## 2025-08-07 RX ORDER — OXYCODONE HYDROCHLORIDE 5 MG/1
5 TABLET ORAL
Status: COMPLETED | OUTPATIENT
Start: 2025-08-07 | End: 2025-08-07

## 2025-08-07 RX ADMIN — FENTANYL CITRATE 50 MCG: 50 INJECTION, SOLUTION INTRAMUSCULAR; INTRAVENOUS at 09:38

## 2025-08-07 RX ADMIN — PROPOFOL 150 MG: 10 INJECTION, EMULSION INTRAVENOUS at 09:38

## 2025-08-07 RX ADMIN — HYDROMORPHONE HYDROCHLORIDE 0.5 MG: 1 INJECTION, SOLUTION INTRAMUSCULAR; INTRAVENOUS; SUBCUTANEOUS at 10:24

## 2025-08-07 RX ADMIN — SODIUM CHLORIDE 2 G: 9 INJECTION, SOLUTION INTRAVENOUS at 09:34

## 2025-08-07 RX ADMIN — SODIUM CHLORIDE, POTASSIUM CHLORIDE, SODIUM LACTATE AND CALCIUM CHLORIDE 9 ML/HR: 600; 310; 30; 20 INJECTION, SOLUTION INTRAVENOUS at 08:44

## 2025-08-07 RX ADMIN — FENTANYL CITRATE 50 MCG: 50 INJECTION, SOLUTION INTRAMUSCULAR; INTRAVENOUS at 09:43

## 2025-08-07 RX ADMIN — LIDOCAINE HYDROCHLORIDE 80 MG: 20 INJECTION, SOLUTION EPIDURAL; INFILTRATION; INTRACAUDAL; PERINEURAL at 09:38

## 2025-08-07 RX ADMIN — OXYCODONE HYDROCHLORIDE 5 MG: 5 TABLET ORAL at 10:25

## 2025-08-07 RX ADMIN — HYDROMORPHONE HYDROCHLORIDE 0.5 MG: 1 INJECTION, SOLUTION INTRAMUSCULAR; INTRAVENOUS; SUBCUTANEOUS at 10:31

## 2025-08-07 RX ADMIN — ONDANSETRON 4 MG: 2 INJECTION, SOLUTION INTRAMUSCULAR; INTRAVENOUS at 09:44

## 2025-08-07 RX ADMIN — SCOLOPAMINE TRANSDERMAL SYSTEM 1 PATCH: 1 PATCH, EXTENDED RELEASE TRANSDERMAL at 08:44

## 2025-08-07 RX ADMIN — DEXAMETHASONE SODIUM PHOSPHATE 4 MG: 4 INJECTION, SOLUTION INTRAMUSCULAR; INTRAVENOUS at 09:46

## 2025-08-07 RX ADMIN — MIDAZOLAM HYDROCHLORIDE 2 MG: 1 INJECTION, SOLUTION INTRAMUSCULAR; INTRAVENOUS at 09:29

## 2025-08-07 RX ADMIN — OXYCODONE HYDROCHLORIDE 5 MG: 5 TABLET ORAL at 10:39

## 2025-08-07 RX ADMIN — HYDROMORPHONE HYDROCHLORIDE 0.25 MG: 1 INJECTION, SOLUTION INTRAMUSCULAR; INTRAVENOUS; SUBCUTANEOUS at 10:43

## 2025-08-20 ENCOUNTER — OFFICE VISIT (OUTPATIENT)
Dept: ORTHOPEDIC SURGERY | Facility: CLINIC | Age: 55
End: 2025-08-20
Payer: MEDICARE

## 2025-08-20 VITALS
HEART RATE: 101 BPM | SYSTOLIC BLOOD PRESSURE: 171 MMHG | BODY MASS INDEX: 30.86 KG/M2 | OXYGEN SATURATION: 98 % | DIASTOLIC BLOOD PRESSURE: 96 MMHG | WEIGHT: 192 LBS | HEIGHT: 66 IN

## 2025-08-20 DIAGNOSIS — Z98.890 S/P ORIF (OPEN REDUCTION INTERNAL FIXATION) FRACTURE: Primary | ICD-10-CM

## 2025-08-20 DIAGNOSIS — Z98.890 S/P HARDWARE REMOVAL: ICD-10-CM

## 2025-08-20 DIAGNOSIS — Z87.81 S/P ORIF (OPEN REDUCTION INTERNAL FIXATION) FRACTURE: Primary | ICD-10-CM

## 2025-08-20 RX ORDER — OXYCODONE HYDROCHLORIDE 10 MG/1
TABLET ORAL
COMMUNITY

## (undated) DEVICE — DRSNG PAD ABD 8X10IN STRL

## (undated) DEVICE — SUT VIC UD BR COAT 0 CP2 27IN

## (undated) DEVICE — DRESSING,GAUZE,XEROFORM,CURAD,1"X8",ST: Brand: CURAD

## (undated) DEVICE — GLOVE,SURG,SENSICARE SLT,LF,PF,7: Brand: MEDLINE

## (undated) DEVICE — UNDYED BRAIDED (POLYGLACTIN 910), SYNTHETIC ABSORBABLE SUTURE: Brand: COATED VICRYL

## (undated) DEVICE — EXTREMITY-LF: Brand: MEDLINE INDUSTRIES, INC.

## (undated) DEVICE — SCRW LP NL TI 3.5X14MM
Type: IMPLANTABLE DEVICE | Site: ANKLE | Status: NON-FUNCTIONAL
Removed: 2025-05-08

## (undated) DEVICE — INTENDED FOR TISSUE SEPARATION, AND OTHER PROCEDURES THAT REQUIRE A SHARP SURGICAL BLADE TO PUNCTURE OR CUT.: Brand: BARD-PARKER ® CARBON RIB-BACK BLADES

## (undated) DEVICE — PENCL SMOKE/EVAC MEGADYNE TELESCP 10FT

## (undated) DEVICE — UNDERCAST PADDING: Brand: DEROYAL

## (undated) DEVICE — STERILE POLYISOPRENE POWDER-FREE SURGICAL GLOVES: Brand: PROTEXIS

## (undated) DEVICE — COVER,C-ARM,41X74: Brand: MEDLINE

## (undated) DEVICE — PROXIMATE RH ROTATING HEAD SKIN STAPLERS (35 WIDE) CONTAINS 35 STAINLESS STEEL STAPLES: Brand: PROXIMATE

## (undated) DEVICE — DISPOSABLE TOURNIQUET CUFF SINGLE BLADDER, SINGLE PORT AND QUICK CONNECT CONNECTOR: Brand: COLOR CUFF

## (undated) DEVICE — THE STERILE LIGHT HANDLE COVER IS USED WITH STERIS SURGICAL LIGHTING AND VISUALIZATION SYSTEMS.

## (undated) DEVICE — DRSNG WND GZ CURAD OIL EMULSION 3X8IN LF STRL 1PK

## (undated) DEVICE — SYR LL TP 10ML STRL

## (undated) DEVICE — DRAPE,TOWEL,LARGE,INVISISHIELD: Brand: MEDLINE

## (undated) DEVICE — DELUXE LF ST 6X5.5 20/CS: Brand: DELUXE LF ST 6X5.5 20/CS

## (undated) DEVICE — Device

## (undated) DEVICE — STERILE POLYISOPRENE POWDER-FREE SURGICAL GLOVES WITH EMOLLIENT COATING: Brand: PROTEXIS

## (undated) DEVICE — BIT DRL TRIMIT 3.5MM

## (undated) DEVICE — BNDG ESMARK STRL 6INX12FT LF

## (undated) DEVICE — IMPLANTABLE DEVICE
Type: IMPLANTABLE DEVICE | Site: ANKLE | Status: NON-FUNCTIONAL
Removed: 2025-05-08

## (undated) DEVICE — SLV SCD KN/LEN ADJ EXPRSS BLENDED MD 1P/U

## (undated) DEVICE — ENCORE® LATEX ORTHO SIZE 8, STERILE LATEX POWDER-FREE SURGICAL GLOVE: Brand: ENCORE

## (undated) DEVICE — BIT DRL TI 2.5MM

## (undated) DEVICE — GAUZE,SPONGE,4"X4",16PLY,STRL,LF,10/TRAY: Brand: MEDLINE

## (undated) DEVICE — BIT DRL 2.5X110MM

## (undated) DEVICE — BNDG ELAS ECON W/CLIP 4IN 5YD LF STRL